# Patient Record
Sex: FEMALE | Race: WHITE | NOT HISPANIC OR LATINO | Employment: OTHER | URBAN - METROPOLITAN AREA
[De-identification: names, ages, dates, MRNs, and addresses within clinical notes are randomized per-mention and may not be internally consistent; named-entity substitution may affect disease eponyms.]

---

## 2023-08-30 ENCOUNTER — HOSPITAL ENCOUNTER (INPATIENT)
Facility: HOSPITAL | Age: 73
LOS: 12 days | Discharge: HOSPICE | DRG: 064 | End: 2023-09-11
Attending: EMERGENCY MEDICINE | Admitting: INTERNAL MEDICINE
Payer: MEDICARE

## 2023-08-30 DIAGNOSIS — I48.91 ATRIAL FIBRILLATION WITH RVR: ICD-10-CM

## 2023-08-30 DIAGNOSIS — I61.9 HEMORRHAGIC CEREBROVASCULAR ACCIDENT (CVA): ICD-10-CM

## 2023-08-30 DIAGNOSIS — Q28.2: ICD-10-CM

## 2023-08-30 DIAGNOSIS — I48.92 ATRIAL FLUTTER: ICD-10-CM

## 2023-08-30 DIAGNOSIS — S51.811A SKIN TEAR OF RIGHT FOREARM WITHOUT COMPLICATION, INITIAL ENCOUNTER: Primary | ICD-10-CM

## 2023-08-30 DIAGNOSIS — Q28.2 AVM (ARTERIOVENOUS MALFORMATION) BRAIN: ICD-10-CM

## 2023-08-30 DIAGNOSIS — R41.82 ALTERED MENTAL STATUS: ICD-10-CM

## 2023-08-30 DIAGNOSIS — I63.9 STROKE: ICD-10-CM

## 2023-08-30 LAB
ANION GAP SERPL CALC-SCNC: 14 MMOL/L (ref 8–16)
APTT PPP: 26.7 SECONDS (ref 23.2–33.7)
BASOPHILS # BLD AUTO: 0.04 X10(3)/MCL
BASOPHILS NFR BLD AUTO: 0.4 %
BUN SERPL-MCNC: 8 MG/DL (ref 6–30)
CHLORIDE SERPL-SCNC: 97 MMOL/L (ref 95–110)
CREAT SERPL-MCNC: 0.6 MG/DL (ref 0.5–1.4)
EOSINOPHIL # BLD AUTO: 0.01 X10(3)/MCL (ref 0–0.9)
EOSINOPHIL NFR BLD AUTO: 0.1 %
ERYTHROCYTE [DISTWIDTH] IN BLOOD BY AUTOMATED COUNT: 13.3 % (ref 11.5–17)
GLUCOSE SERPL-MCNC: 129 MG/DL (ref 70–110)
HCT VFR BLD AUTO: 43.2 % (ref 37–47)
HCT VFR BLD CALC: 45 %PCV (ref 36–54)
HGB BLD-MCNC: 15 G/DL
HGB BLD-MCNC: 15 G/DL (ref 12–16)
IMM GRANULOCYTES # BLD AUTO: 0.03 X10(3)/MCL (ref 0–0.04)
IMM GRANULOCYTES NFR BLD AUTO: 0.3 %
INR PPP: 1.1
LYMPHOCYTES # BLD AUTO: 1.02 X10(3)/MCL (ref 0.6–4.6)
LYMPHOCYTES NFR BLD AUTO: 10.9 %
MCH RBC QN AUTO: 32.6 PG (ref 27–31)
MCHC RBC AUTO-ENTMCNC: 34.7 G/DL (ref 33–36)
MCV RBC AUTO: 93.9 FL (ref 80–94)
MONOCYTES # BLD AUTO: 0.54 X10(3)/MCL (ref 0.1–1.3)
MONOCYTES NFR BLD AUTO: 5.7 %
NEUTROPHILS # BLD AUTO: 7.76 X10(3)/MCL (ref 2.1–9.2)
NEUTROPHILS NFR BLD AUTO: 82.6 %
NRBC BLD AUTO-RTO: 0 %
PLATELET # BLD AUTO: 174 X10(3)/MCL (ref 130–400)
PMV BLD AUTO: 9.8 FL (ref 7.4–10.4)
POC IONIZED CALCIUM: 1.18 MMOL/L (ref 1.06–1.42)
POC PTINR: 1.1 (ref 0.9–1.2)
POC PTWBT: 13.5 SEC (ref 9.7–14.3)
POC TCO2 (MEASURED): 27 MMOL/L (ref 23–29)
POTASSIUM BLD-SCNC: 4.5 MMOL/L (ref 3.5–5.1)
PROTHROMBIN TIME: 13.9 SECONDS (ref 12.5–14.5)
RBC # BLD AUTO: 4.6 X10(6)/MCL (ref 4.2–5.4)
SAMPLE: ABNORMAL
SAMPLE: NORMAL
SODIUM BLD-SCNC: 133 MMOL/L (ref 136–145)
WBC # SPEC AUTO: 9.4 X10(3)/MCL (ref 4.5–11.5)

## 2023-08-30 PROCEDURE — 80053 COMPREHEN METABOLIC PANEL: CPT | Performed by: EMERGENCY MEDICINE

## 2023-08-30 PROCEDURE — 90471 IMMUNIZATION ADMIN: CPT | Performed by: EMERGENCY MEDICINE

## 2023-08-30 PROCEDURE — G0390 TRAUMA RESPONS W/HOSP CRITI: HCPCS

## 2023-08-30 PROCEDURE — 90715 TDAP VACCINE 7 YRS/> IM: CPT | Performed by: EMERGENCY MEDICINE

## 2023-08-30 PROCEDURE — 85025 COMPLETE CBC W/AUTO DIFF WBC: CPT | Performed by: EMERGENCY MEDICINE

## 2023-08-30 PROCEDURE — 80047 BASIC METABLC PNL IONIZED CA: CPT

## 2023-08-30 PROCEDURE — 96374 THER/PROPH/DIAG INJ IV PUSH: CPT

## 2023-08-30 PROCEDURE — 85730 THROMBOPLASTIN TIME PARTIAL: CPT | Performed by: EMERGENCY MEDICINE

## 2023-08-30 PROCEDURE — 82077 ASSAY SPEC XCP UR&BREATH IA: CPT | Performed by: EMERGENCY MEDICINE

## 2023-08-30 PROCEDURE — 84484 ASSAY OF TROPONIN QUANT: CPT | Performed by: EMERGENCY MEDICINE

## 2023-08-30 PROCEDURE — 11000001 HC ACUTE MED/SURG PRIVATE ROOM

## 2023-08-30 PROCEDURE — 85610 PROTHROMBIN TIME: CPT | Performed by: EMERGENCY MEDICINE

## 2023-08-30 PROCEDURE — 25000003 PHARM REV CODE 250: Performed by: EMERGENCY MEDICINE

## 2023-08-30 PROCEDURE — 63600175 PHARM REV CODE 636 W HCPCS: Performed by: EMERGENCY MEDICINE

## 2023-08-30 PROCEDURE — 99285 EMERGENCY DEPT VISIT HI MDM: CPT | Mod: 25

## 2023-08-30 RX ORDER — LEVETIRACETAM 10 MG/ML
1000 INJECTION INTRAVASCULAR
Status: COMPLETED | OUTPATIENT
Start: 2023-08-31 | End: 2023-08-31

## 2023-08-30 RX ORDER — FENTANYL CITRATE 50 UG/ML
50 INJECTION, SOLUTION INTRAMUSCULAR; INTRAVENOUS
Status: COMPLETED | OUTPATIENT
Start: 2023-08-31 | End: 2023-08-31

## 2023-08-30 RX ORDER — LORAZEPAM 2 MG/ML
INJECTION INTRAMUSCULAR
Status: DISPENSED
Start: 2023-08-30 | End: 2023-08-31

## 2023-08-30 RX ORDER — LORAZEPAM 2 MG/ML
INJECTION INTRAMUSCULAR CODE/TRAUMA/SEDATION MEDICATION
Status: COMPLETED | OUTPATIENT
Start: 2023-08-30 | End: 2023-08-30

## 2023-08-30 RX ORDER — ONDANSETRON 2 MG/ML
8 INJECTION INTRAMUSCULAR; INTRAVENOUS
Status: COMPLETED | OUTPATIENT
Start: 2023-08-31 | End: 2023-08-31

## 2023-08-30 RX ADMIN — TETANUS TOXOID, REDUCED DIPHTHERIA TOXOID AND ACELLULAR PERTUSSIS VACCINE, ADSORBED 0.5 ML: 5; 2.5; 8; 8; 2.5 SUSPENSION INTRAMUSCULAR at 11:08

## 2023-08-30 RX ADMIN — LORAZEPAM 1 MG: 2 INJECTION INTRAMUSCULAR; INTRAVENOUS at 11:08

## 2023-08-30 RX ADMIN — SODIUM CHLORIDE 1000 ML: 9 INJECTION, SOLUTION INTRAVENOUS at 11:08

## 2023-08-30 RX ADMIN — IOPAMIDOL 50 ML: 755 INJECTION, SOLUTION INTRAVENOUS at 11:08

## 2023-08-30 NOTE — Clinical Note
Diagnosis: Hemorrhagic cerebrovascular accident (CVA) [1966252]   Admitting Provider:: ARIS ESTEVEZ JR. [[id number]]   Future Attending Provider: ARIS ESTEVEZ JR. [[id number]]   Reason for IP Medical Treatment  (Clinical interventions that can only be accomplished in the IP setting? ) :: IV BP control, q1h neuro checks   I certify that Inpatient services for greater than or equal to 2 midnights are medically necessary:: Yes   Plans for Post-Acute care--if anticipated (pick the single best option):: A. No post acute care anticipated at this time

## 2023-08-31 ENCOUNTER — ANESTHESIA (OUTPATIENT)
Dept: INTERVENTIONAL RADIOLOGY/VASCULAR | Facility: HOSPITAL | Age: 73
DRG: 064 | End: 2023-08-31
Payer: MEDICARE

## 2023-08-31 PROBLEM — I61.9 INTRACEREBRAL HEMORRHAGE: Status: ACTIVE | Noted: 2023-08-31

## 2023-08-31 LAB
ALBUMIN SERPL-MCNC: 4 G/DL (ref 3.4–4.8)
ALBUMIN SERPL-MCNC: 4 G/DL (ref 3.4–4.8)
ALBUMIN/GLOB SERPL: 1.4 RATIO (ref 1.1–2)
ALBUMIN/GLOB SERPL: 1.4 RATIO (ref 1.1–2)
ALLENS TEST BLOOD GAS (OHS): ABNORMAL
ALP SERPL-CCNC: 69 UNIT/L (ref 40–150)
ALP SERPL-CCNC: 72 UNIT/L (ref 40–150)
ALT SERPL-CCNC: 11 UNIT/L (ref 0–55)
ALT SERPL-CCNC: 12 UNIT/L (ref 0–55)
AMPHET UR QL SCN: NEGATIVE
APPEARANCE UR: CLEAR
AST SERPL-CCNC: 17 UNIT/L (ref 5–34)
AST SERPL-CCNC: 22 UNIT/L (ref 5–34)
BACTERIA #/AREA URNS AUTO: NORMAL /HPF
BARBITURATE SCN PRESENT UR: NEGATIVE
BASE EXCESS BLD CALC-SCNC: 1.6 MMOL/L (ref -2–2)
BASOPHILS # BLD AUTO: 0.04 X10(3)/MCL
BASOPHILS NFR BLD AUTO: 0.3 %
BENZODIAZ UR QL SCN: NEGATIVE
BILIRUB SERPL-MCNC: 0.6 MG/DL
BILIRUB SERPL-MCNC: 0.8 MG/DL
BILIRUB UR QL STRIP.AUTO: NEGATIVE
BLOOD GAS SAMPLE TYPE (OHS): ABNORMAL
BUN SERPL-MCNC: 6.6 MG/DL (ref 9.8–20.1)
BUN SERPL-MCNC: 7.6 MG/DL (ref 9.8–20.1)
CA-I BLD-SCNC: 1.09 MMOL/L (ref 1.12–1.23)
CALCIUM SERPL-MCNC: 8.9 MG/DL (ref 8.4–10.2)
CALCIUM SERPL-MCNC: 9.3 MG/DL (ref 8.4–10.2)
CANNABINOIDS UR QL SCN: NEGATIVE
CHLORIDE SERPL-SCNC: 100 MMOL/L (ref 98–107)
CHLORIDE SERPL-SCNC: 99 MMOL/L (ref 98–107)
CO2 BLDA-SCNC: 14.9 MMOL/L
CO2 SERPL-SCNC: 24 MMOL/L (ref 23–31)
CO2 SERPL-SCNC: 24 MMOL/L (ref 23–31)
COCAINE UR QL SCN: NEGATIVE
COLOR UR: YELLOW
CREAT SERPL-MCNC: 0.63 MG/DL (ref 0.55–1.02)
CREAT SERPL-MCNC: 0.67 MG/DL (ref 0.55–1.02)
DRAWN BY BLOOD GAS (OHS): ABNORMAL
EOSINOPHIL # BLD AUTO: 0 X10(3)/MCL (ref 0–0.9)
EOSINOPHIL NFR BLD AUTO: 0 %
ERYTHROCYTE [DISTWIDTH] IN BLOOD BY AUTOMATED COUNT: 13.3 % (ref 11.5–17)
ETHANOL SERPL-MCNC: <10 MG/DL
FENTANYL UR QL SCN: NEGATIVE
FIO2 BLOOD GAS (OHS): 30 %
GFR SERPLBLD CREATININE-BSD FMLA CKD-EPI: >60 MLS/MIN/1.73/M2
GFR SERPLBLD CREATININE-BSD FMLA CKD-EPI: >60 MLS/MIN/1.73/M2
GLOBULIN SER-MCNC: 2.8 GM/DL (ref 2.4–3.5)
GLOBULIN SER-MCNC: 2.9 GM/DL (ref 2.4–3.5)
GLUCOSE SERPL-MCNC: 130 MG/DL (ref 82–115)
GLUCOSE SERPL-MCNC: 143 MG/DL (ref 82–115)
GLUCOSE UR QL STRIP.AUTO: NEGATIVE
HCO3 BLDA-SCNC: 25.9 MMOL/L (ref 22–26)
HCT VFR BLD AUTO: 42.6 % (ref 37–47)
HGB BLD-MCNC: 14.7 G/DL (ref 12–16)
IMM GRANULOCYTES # BLD AUTO: 0.05 X10(3)/MCL (ref 0–0.04)
IMM GRANULOCYTES NFR BLD AUTO: 0.4 %
KETONES UR QL STRIP.AUTO: NEGATIVE
LEUKOCYTE ESTERASE UR QL STRIP.AUTO: NEGATIVE
LYMPHOCYTES # BLD AUTO: 0.82 X10(3)/MCL (ref 0.6–4.6)
LYMPHOCYTES NFR BLD AUTO: 6.8 %
MAGNESIUM SERPL-MCNC: 1.7 MG/DL (ref 1.6–2.6)
MCH RBC QN AUTO: 32.6 PG (ref 27–31)
MCHC RBC AUTO-ENTMCNC: 34.5 G/DL (ref 33–36)
MCV RBC AUTO: 94.5 FL (ref 80–94)
MDMA UR QL SCN: NEGATIVE
MECH RR (OHS): 20 B/MIN
MECH VT (OHS): 450 ML
MODE (OHS): AC
MONOCYTES # BLD AUTO: 0.82 X10(3)/MCL (ref 0.1–1.3)
MONOCYTES NFR BLD AUTO: 6.8 %
NEUTROPHILS # BLD AUTO: 10.4 X10(3)/MCL (ref 2.1–9.2)
NEUTROPHILS NFR BLD AUTO: 85.7 %
NITRITE UR QL STRIP.AUTO: NEGATIVE
NRBC BLD AUTO-RTO: 0 %
OPIATES UR QL SCN: NEGATIVE
OXYGEN DEVICE BLOOD GAS (OHS): ABNORMAL
PCO2 BLDA: 39 MMHG (ref 35–45)
PCP UR QL: NEGATIVE
PEEP (OHS): 5 CMH2O
PH BLDA: 7.43 [PH] (ref 7.35–7.45)
PH UR STRIP.AUTO: 7.5 [PH]
PH UR: 7.5 [PH] (ref 3–11)
PHOSPHATE SERPL-MCNC: 2.7 MG/DL (ref 2.3–4.7)
PLATELET # BLD AUTO: 181 X10(3)/MCL (ref 130–400)
PMV BLD AUTO: 10.2 FL (ref 7.4–10.4)
PO2 BLDA: 84 MMHG (ref 80–100)
POTASSIUM BLOOD GAS (OHS): 2.9 MMOL/L (ref 3.5–5)
POTASSIUM SERPL-SCNC: 3.8 MMOL/L (ref 3.5–5.1)
POTASSIUM SERPL-SCNC: 4 MMOL/L (ref 3.5–5.1)
PROT SERPL-MCNC: 6.8 GM/DL (ref 5.8–7.6)
PROT SERPL-MCNC: 6.9 GM/DL (ref 5.8–7.6)
PROT UR QL STRIP.AUTO: NEGATIVE
PS (OHS): 10 CMH2O
RBC # BLD AUTO: 4.51 X10(6)/MCL (ref 4.2–5.4)
RBC #/AREA URNS AUTO: NORMAL /HPF
RBC UR QL AUTO: NEGATIVE
SAMPLE SITE BLOOD GAS (OHS): ABNORMAL
SAO2 % BLDA: 96.6 %
SODIUM BLOOD GAS (OHS): 129 MMOL/L (ref 137–145)
SODIUM SERPL-SCNC: 133 MMOL/L (ref 136–145)
SODIUM SERPL-SCNC: 134 MMOL/L (ref 136–145)
SP GR UR STRIP.AUTO: 1.02 (ref 1–1.03)
SPECIFIC GRAVITY, URINE AUTO (.000) (OHS): 1.02 (ref 1–1.03)
SQUAMOUS #/AREA URNS AUTO: NORMAL /HPF
TROPONIN I SERPL-MCNC: <0.01 NG/ML (ref 0–0.04)
UROBILINOGEN UR STRIP-ACNC: 0.2
WBC # SPEC AUTO: 12.13 X10(3)/MCL (ref 4.5–11.5)
WBC #/AREA URNS AUTO: NORMAL /HPF

## 2023-08-31 PROCEDURE — 25000003 PHARM REV CODE 250: Performed by: INTERNAL MEDICINE

## 2023-08-31 PROCEDURE — 63600175 PHARM REV CODE 636 W HCPCS

## 2023-08-31 PROCEDURE — D9220A PRA ANESTHESIA: Mod: ANES,,, | Performed by: ANESTHESIOLOGY

## 2023-08-31 PROCEDURE — 80053 COMPREHEN METABOLIC PANEL: CPT

## 2023-08-31 PROCEDURE — 25000003 PHARM REV CODE 250

## 2023-08-31 PROCEDURE — 36224 PR ANGIO INTRCRNL ART +/- CERVIOCEREBRAL ARCH, INTRNL CAROTID ART, SELECTV CATH ,S&I: ICD-10-PCS | Mod: 50,,, | Performed by: PSYCHIATRY & NEUROLOGY

## 2023-08-31 PROCEDURE — 76937 US GUIDE VASCULAR ACCESS: CPT | Mod: 26,,, | Performed by: PSYCHIATRY & NEUROLOGY

## 2023-08-31 PROCEDURE — 99223 PR INITIAL HOSPITAL CARE,LEVL III: ICD-10-PCS | Mod: ,,, | Performed by: NEUROLOGICAL SURGERY

## 2023-08-31 PROCEDURE — 36227 PR ANGIO XTRNL CAROTD CIRC, XTRNL CAROTID, SELECTV CATH S&I: ICD-10-PCS | Mod: 50,,, | Performed by: PSYCHIATRY & NEUROLOGY

## 2023-08-31 PROCEDURE — S0166 INJ OLANZAPINE 2.5MG: HCPCS

## 2023-08-31 PROCEDURE — 63600175 PHARM REV CODE 636 W HCPCS: Performed by: INTERNAL MEDICINE

## 2023-08-31 PROCEDURE — 63600175 PHARM REV CODE 636 W HCPCS: Performed by: EMERGENCY MEDICINE

## 2023-08-31 PROCEDURE — 81001 URINALYSIS AUTO W/SCOPE: CPT | Performed by: EMERGENCY MEDICINE

## 2023-08-31 PROCEDURE — D9220A PRA ANESTHESIA: Mod: CRNA,,,

## 2023-08-31 PROCEDURE — 80307 DRUG TEST PRSMV CHEM ANLYZR: CPT | Performed by: EMERGENCY MEDICINE

## 2023-08-31 PROCEDURE — 20000000 HC ICU ROOM

## 2023-08-31 PROCEDURE — 27100171 HC OXYGEN HIGH FLOW UP TO 24 HOURS

## 2023-08-31 PROCEDURE — 63600175 PHARM REV CODE 636 W HCPCS: Mod: JZ,JG | Performed by: EMERGENCY MEDICINE

## 2023-08-31 PROCEDURE — 93005 ELECTROCARDIOGRAM TRACING: CPT

## 2023-08-31 PROCEDURE — 36226 PR ANGIO VERTEBRAL ARTERY +/- CERVIOCEREBRAL ARCH, VERTEBRAL ART, SELECTV CATH,S&I: ICD-10-PCS | Mod: 51,50,, | Performed by: PSYCHIATRY & NEUROLOGY

## 2023-08-31 PROCEDURE — 25500020 PHARM REV CODE 255: Performed by: EMERGENCY MEDICINE

## 2023-08-31 PROCEDURE — 76937 PR  US GUIDE, VASCULAR ACCESS: ICD-10-PCS | Mod: 26,,, | Performed by: PSYCHIATRY & NEUROLOGY

## 2023-08-31 PROCEDURE — 36227 PLACE CATH XTRNL CAROTID: CPT | Mod: 50,,, | Performed by: PSYCHIATRY & NEUROLOGY

## 2023-08-31 PROCEDURE — 82803 BLOOD GASES ANY COMBINATION: CPT

## 2023-08-31 PROCEDURE — 36224 PLACE CATH CAROTD ART: CPT | Mod: 50,,, | Performed by: PSYCHIATRY & NEUROLOGY

## 2023-08-31 PROCEDURE — 94002 VENT MGMT INPAT INIT DAY: CPT

## 2023-08-31 PROCEDURE — 84100 ASSAY OF PHOSPHORUS: CPT

## 2023-08-31 PROCEDURE — 83735 ASSAY OF MAGNESIUM: CPT

## 2023-08-31 PROCEDURE — C9248 INJ, CLEVIDIPINE BUTYRATE: HCPCS | Mod: JZ,JG | Performed by: EMERGENCY MEDICINE

## 2023-08-31 PROCEDURE — C1751 CATH, INF, PER/CENT/MIDLINE: HCPCS

## 2023-08-31 PROCEDURE — 99223 1ST HOSP IP/OBS HIGH 75: CPT | Mod: ,,, | Performed by: NEUROLOGICAL SURGERY

## 2023-08-31 PROCEDURE — D9220A PRA ANESTHESIA: ICD-10-PCS | Mod: ANES,,, | Performed by: ANESTHESIOLOGY

## 2023-08-31 PROCEDURE — 99900026 HC AIRWAY MAINTENANCE (STAT)

## 2023-08-31 PROCEDURE — D9220A PRA ANESTHESIA: ICD-10-PCS | Mod: CRNA,,,

## 2023-08-31 PROCEDURE — 63600175 PHARM REV CODE 636 W HCPCS: Performed by: PHYSICIAN ASSISTANT

## 2023-08-31 PROCEDURE — 99900035 HC TECH TIME PER 15 MIN (STAT)

## 2023-08-31 PROCEDURE — 94761 N-INVAS EAR/PLS OXIMETRY MLT: CPT

## 2023-08-31 PROCEDURE — 36226 PLACE CATH VERTEBRAL ART: CPT | Mod: 51,50,, | Performed by: PSYCHIATRY & NEUROLOGY

## 2023-08-31 PROCEDURE — 85025 COMPLETE CBC W/AUTO DIFF WBC: CPT

## 2023-08-31 PROCEDURE — 27200966 HC CLOSED SUCTION SYSTEM

## 2023-08-31 PROCEDURE — 36569 INSJ PICC 5 YR+ W/O IMAGING: CPT

## 2023-08-31 PROCEDURE — 37799 UNLISTED PX VASCULAR SURGERY: CPT

## 2023-08-31 RX ORDER — OLANZAPINE 10 MG/2ML
5 INJECTION, POWDER, FOR SOLUTION INTRAMUSCULAR ONCE
Status: COMPLETED | OUTPATIENT
Start: 2023-08-31 | End: 2023-08-31

## 2023-08-31 RX ORDER — FENTANYL CITRATE 50 UG/ML
INJECTION, SOLUTION INTRAMUSCULAR; INTRAVENOUS
Status: DISCONTINUED | OUTPATIENT
Start: 2023-08-31 | End: 2023-08-31

## 2023-08-31 RX ORDER — LABETALOL HYDROCHLORIDE 5 MG/ML
10 INJECTION, SOLUTION INTRAVENOUS EVERY 4 HOURS PRN
Status: DISCONTINUED | OUTPATIENT
Start: 2023-08-31 | End: 2023-09-11 | Stop reason: HOSPADM

## 2023-08-31 RX ORDER — LIDOCAINE HYDROCHLORIDE 20 MG/ML
INJECTION, SOLUTION EPIDURAL; INFILTRATION; INTRACAUDAL; PERINEURAL
Status: DISCONTINUED | OUTPATIENT
Start: 2023-08-31 | End: 2023-08-31

## 2023-08-31 RX ORDER — LORAZEPAM 2 MG/ML
1 INJECTION INTRAMUSCULAR ONCE
Status: COMPLETED | OUTPATIENT
Start: 2023-08-31 | End: 2023-08-31

## 2023-08-31 RX ORDER — PROPOFOL 10 MG/ML
VIAL (ML) INTRAVENOUS
Status: DISCONTINUED | OUTPATIENT
Start: 2023-08-31 | End: 2023-08-31

## 2023-08-31 RX ORDER — PROPOFOL 10 MG/ML
0-50 INJECTION, EMULSION INTRAVENOUS CONTINUOUS
Status: DISCONTINUED | OUTPATIENT
Start: 2023-08-31 | End: 2023-09-02

## 2023-08-31 RX ORDER — SODIUM CHLORIDE 0.9 % (FLUSH) 0.9 %
10 SYRINGE (ML) INJECTION
Status: DISCONTINUED | OUTPATIENT
Start: 2023-08-31 | End: 2023-09-04

## 2023-08-31 RX ORDER — PROPOFOL 10 MG/ML
INJECTION, EMULSION INTRAVENOUS
Status: COMPLETED
Start: 2023-08-31 | End: 2023-09-01

## 2023-08-31 RX ORDER — MUPIROCIN 20 MG/G
OINTMENT TOPICAL 2 TIMES DAILY
Status: COMPLETED | OUTPATIENT
Start: 2023-08-31 | End: 2023-09-04

## 2023-08-31 RX ORDER — EPHEDRINE SULFATE 50 MG/ML
INJECTION, SOLUTION INTRAVENOUS
Status: DISCONTINUED | OUTPATIENT
Start: 2023-08-31 | End: 2023-08-31

## 2023-08-31 RX ORDER — FENTANYL CITRATE-0.9 % NACL/PF 10 MCG/ML
0-250 PLASTIC BAG, INJECTION (ML) INTRAVENOUS CONTINUOUS PRN
Status: DISCONTINUED | OUTPATIENT
Start: 2023-08-31 | End: 2023-09-03

## 2023-08-31 RX ORDER — 3% SODIUM CHLORIDE 3 G/100ML
50 INJECTION, SOLUTION INTRAVENOUS CONTINUOUS
Status: DISCONTINUED | OUTPATIENT
Start: 2023-08-31 | End: 2023-09-02

## 2023-08-31 RX ORDER — SODIUM CHLORIDE 0.9 % (FLUSH) 0.9 %
10 SYRINGE (ML) INJECTION EVERY 6 HOURS
Status: DISCONTINUED | OUTPATIENT
Start: 2023-09-01 | End: 2023-09-04

## 2023-08-31 RX ORDER — SODIUM CHLORIDE 0.9 % (FLUSH) 0.9 %
10 SYRINGE (ML) INJECTION
Status: DISCONTINUED | OUTPATIENT
Start: 2023-08-31 | End: 2023-09-01 | Stop reason: SDUPTHER

## 2023-08-31 RX ORDER — ROCURONIUM BROMIDE 10 MG/ML
INJECTION, SOLUTION INTRAVENOUS
Status: DISCONTINUED | OUTPATIENT
Start: 2023-08-31 | End: 2023-08-31

## 2023-08-31 RX ORDER — PHENYLEPHRINE HYDROCHLORIDE 10 MG/ML
INJECTION INTRAVENOUS
Status: DISCONTINUED | OUTPATIENT
Start: 2023-08-31 | End: 2023-08-31

## 2023-08-31 RX ADMIN — PROPOFOL 1000 MG: 10 INJECTION, EMULSION INTRAVENOUS at 04:08

## 2023-08-31 RX ADMIN — SODIUM CHLORIDE 50 ML/HR: 3 INJECTION, SOLUTION INTRAVENOUS at 09:08

## 2023-08-31 RX ADMIN — LORAZEPAM 1 MG: 2 INJECTION INTRAMUSCULAR; INTRAVENOUS at 05:08

## 2023-08-31 RX ADMIN — PHENYLEPHRINE HYDROCHLORIDE 100 MCG: 10 INJECTION INTRAVENOUS at 11:08

## 2023-08-31 RX ADMIN — SODIUM CHLORIDE, SODIUM GLUCONATE, SODIUM ACETATE, POTASSIUM CHLORIDE AND MAGNESIUM CHLORIDE: 526; 502; 368; 37; 30 INJECTION, SOLUTION INTRAVENOUS at 11:08

## 2023-08-31 RX ADMIN — PROPOFOL 50 MCG/KG/MIN: 10 INJECTION, EMULSION INTRAVENOUS at 05:08

## 2023-08-31 RX ADMIN — CLEVIPIDINE 2 MG/HR: 0.5 EMULSION INTRAVENOUS at 02:08

## 2023-08-31 RX ADMIN — LEVETIRACETAM 500 MG: 100 INJECTION, SOLUTION INTRAVENOUS at 08:08

## 2023-08-31 RX ADMIN — LEVETIRACETAM 500 MG: 100 INJECTION, SOLUTION INTRAVENOUS at 10:08

## 2023-08-31 RX ADMIN — PHENYLEPHRINE HYDROCHLORIDE 100 MCG: 10 INJECTION INTRAVENOUS at 12:08

## 2023-08-31 RX ADMIN — ONDANSETRON 8 MG: 2 INJECTION INTRAMUSCULAR; INTRAVENOUS at 12:08

## 2023-08-31 RX ADMIN — LIDOCAINE HYDROCHLORIDE 80 MG: 20 INJECTION, SOLUTION INTRAVENOUS at 11:08

## 2023-08-31 RX ADMIN — MUPIROCIN: 20 OINTMENT TOPICAL at 08:08

## 2023-08-31 RX ADMIN — ROCURONIUM BROMIDE 60 MG: 10 SOLUTION INTRAVENOUS at 11:08

## 2023-08-31 RX ADMIN — Medication 25 MCG/HR: at 09:08

## 2023-08-31 RX ADMIN — MUPIROCIN: 20 OINTMENT TOPICAL at 10:08

## 2023-08-31 RX ADMIN — FENTANYL CITRATE 50 MCG: 50 INJECTION, SOLUTION INTRAMUSCULAR; INTRAVENOUS at 12:08

## 2023-08-31 RX ADMIN — EPHEDRINE SULFATE 10 MG: 50 INJECTION, SOLUTION INTRAMUSCULAR; INTRAVENOUS; SUBCUTANEOUS at 12:08

## 2023-08-31 RX ADMIN — LEVETIRACETAM INJECTION 1000 MG: 10 INJECTION INTRAVENOUS at 12:08

## 2023-08-31 RX ADMIN — FENTANYL CITRATE 100 MCG: 50 INJECTION, SOLUTION INTRAMUSCULAR; INTRAVENOUS at 11:08

## 2023-08-31 RX ADMIN — PROPOFOL 150 MG: 10 INJECTION, EMULSION INTRAVENOUS at 11:08

## 2023-08-31 RX ADMIN — ROCURONIUM BROMIDE 40 MG: 10 SOLUTION INTRAVENOUS at 12:08

## 2023-08-31 RX ADMIN — OLANZAPINE 5 MG: 10 INJECTION, POWDER, FOR SOLUTION INTRAMUSCULAR at 03:08

## 2023-08-31 NOTE — H&P
[facility] - Emergency Dept  Pulmonary Critical Care Note    Patient Name: Bal Cline  MRN: [id number]  Admission Date: 8/30/2023  Hospital Length of Stay: 1 days  Code Status: No Order  Attending Provider: Roberto Biswas Jr., MD,*  Primary Care Provider: No primary care provider on file.     Subjective:     HPI:   Patient is a 73-year-old female with no past medical history on file who presented to the ED on 08/30 via EMS after sustaining a fall at home.  Per EMS, patient fell off the side of the bed but was caught by her .  Denied loss of consciousness and head trauma.  EMS reported that the patient was confused on arrival and having slurred speech with right-sided facial droop.  Last known normal time was around 2100 today.  Patient's family was not present in the ED during my evaluation of the patient and the patient was not able to contribute to history.    In the ED, CT head was conducted which displayed evidence of left hemorrhagic CVA.  Neurosurgery was consulted who recommended blood pressure control with systolic blood pressure less than 140 and repeat head CT in 6 hours.  Keppra for seizure prophylaxis.  Patient was admitted to the ICU for further care and management.    Hospital Course/Significant events:      24 Hour Interval History:      No past medical history on file.    No past surgical history on file.     No current outpatient medications    Current Inpatient Medications   levETIRAcetam (Keppra) IV (PEDS and ADULTS)  1,000 mg Intravenous ED 1 Time    LORazepam        sodium chloride 0.9%  1,000 mL Intravenous ED 1 Time       Current Intravenous Infusions   clevidipine       Review of Systems   Unable to perform ROS: Acuity of condition      Objective:     No intake or output data in the 24 hours ending 08/31/23 0020      Vital Signs (Most Recent):  Resp: 20 (08/31/23 0000)  There is no height or weight on file to calculate BMI.    Vital Signs (24h Range):  Resp:   "[20] 20     Physical Exam  Vitals and nursing note reviewed.   Constitutional:       Comments: Patient awake, mostly nonverbal, and follows some commands   HENT:      Head: Normocephalic and atraumatic.   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  Left pupil is approximately 3 mm in diameter.   Cardiovascular:      Rate and Rhythm: Normal rate and regular rhythm.      Pulses: Normal pulses.      Heart sounds: Normal heart sounds.   Pulmonary:      Effort: Pulmonary effort is normal.      Breath sounds: Normal breath sounds.   Abdominal:      General: There is no distension.      Palpations: Abdomen is soft.   Musculoskeletal:         General: Signs of injury present.      Comments: Patient displays movement of of left upper and lower extremities.  She does not move right upper and lower extremities.  There are wounds to bilateral upper extremities.   Skin:     General: Skin is warm and dry.   Neurological:      Mental Status: She is disoriented.       Lines/Drains/Airways       Peripheral Intravenous Line  Duration                  Peripheral IV - Single Lumen 08/30/23 2304 18 G Anterior;Left;Proximal Forearm <1 day                    Significant Labs:    Lab Results   Component Value Date    WBC 9.40 08/30/2023    HGB 15.0 08/30/2023    HCT 45 08/30/2023    MCV 93.9 08/30/2023     08/30/2023     BMP  Lab Results   Component Value Date     (L) 08/30/2023    K 4.0 08/30/2023    CHLORIDE 99 08/30/2023    CO2 24 08/30/2023    BUN 7.6 (L) 08/30/2023    CREATININE 0.67 08/30/2023    CALCIUM 9.3 08/30/2023     ABG  No results for input(s): "PH", "PO2", "PCO2", "HCO3", "POCBASEDEF" in the last 168 hours.       Significant Imaging:  I have reviewed the pertinent imaging within the past 24 hours.  Assessment/Plan:     Assessment  Left-sided hemorrhagic CVA as demonstrated on CT head with no evidence of midline shift    Plan  1. Patient admitted " to ICU for further care and management   2. Patient started on Cleviprex for blood pressure management.  Labetalol ordered p.r.n.  Goal is to keep systolic blood pressure less than 140.  3. Repeat head CT in 6 hours per neurosurgery  4. Keppra for seizure prophylaxis 500 b.i.d.  5. Q1hr neuro checks, avoid antiplatelets/anticoagulation  5. Continue to follow Neurosurgery recommendations    45 minutes of critical care was time spent personally by me on the following activities: development of treatment plan with patient or surrogate and bedside caregivers, discussions with consultants, evaluation of patient's response to treatment, examination of patient, ordering and performing treatments and interventions, ordering and review of laboratory studies, ordering and review of radiographic studies, pulse oximetry, re-evaluation of patient's condition.  This critical care time did not overlap with that of any other provider or involve time for any procedures.     Ramiro De Santiago,   Pulmonary Critical Care Medicine  [facility] - Emergency Dept

## 2023-08-31 NOTE — ASSESSMENT & PLAN NOTE
Intracerebral hemorrhage and traumatic SAH    -CTA head and neck: No acute angiographic abnormality in the CT angiogram of the head and neck with no aneurysm dissection or occlusion identified. Details and other findings as described above.    8/31/23 (5:40 am)  -CT head:   1. There is a stable appearing intraparenchymal hematoma in the left temporal lobe measuring 4.5 x 4.5 cm. There is intraventricular extension of bleed with mild intraventricular hemorrhage layering in the dependent portion of the occipital horns of bilateral lateral ventricles. There is perilesional edema causing mass effect in the form of effacement of adjacent sulcal spaces and compression on the left lateral ventricle. There is stable appearing subarachnoid hemorrhage in the sulcal spaces of the left temporal lobe and in the left sylvian fissure.  2. Details and other findings as noted above.    -Recommend SBP less than 140  -Q1 hour neuro checks  -Avoid antiplatelets/anticoagulation  -Seizure precautions   -neurosurgery following, no surgical intervention  -could consider repeat CT head tomorrow  -could consider MRI eventually (does not need to be done now, will discuss with MD during rounds, could be done in the future, unlikely she will be cooperative for MRI at this time)

## 2023-08-31 NOTE — CONSULTS
"   Trauma Surgery   Activation Note    Patient Name: Bozena Shelton  MRN: [id number]   YOB: 1950  Date: 08/31/2023    LEVEL 2 TRAUMA     Subjective:   History of present illness: Patient is an approximately 73 year old female presenting via ground EMS. Family states she was acting differently for the last two hours with slurred speech, possible facial droop and agitation. When she got out of bed, she went to fall but was caught by her . EMS denies her hitting head. No reported blood thinners. Is incontinent of stool with right sided neglect and repetitive questioning. Right pupil with suspected chronic changes: irregular shape but briskly reactive.     Primary Survey:  A patent   B CTAB   C Oswaldo radial and BP pulses, skin pale but warm and dry   D GCS 13(E 4, V 4, M 5)    E exposed, log-rolled and examined (see below)   F See below     VITAL SIGNS: 24 HR MIN & MAX LAST   Temp  Min: 97.8 °F (36.6 °C)  Max: 98.8 °F (37.1 °C)  98.1 °F (36.7 °C)   BP  Min: 106/88  Max: 152/84  (!) 152/84    Pulse  Min: 56  Max: 117  (!) 115    Resp  Min: 12  Max: 30  (!) 30    SpO2  Min: 93 %  Max: 99 %  97 %      HT: 5' 6" (167.6 cm)  WT: 50 kg (110 lb 3.7 oz)  BMI: 17.8     FAST: deferred    Medications/transfusions received en-route: none  Medications/transfusions received in trauma bay: ativan tdap     Scheduled Meds:   levETIRAcetam (Keppra) IV (PEDS and ADULTS)  500 mg Intravenous Q12H    LORazepam        mupirocin   Nasal BID    OLANZapine  5 mg Intramuscular Once     Continuous Infusions:   clevidipine       PRN Meds:labetalol, LORazepam    ROS:  CYN due to mentation/ cooperation     Allergies: unable to obtain secondary to acuity of the patient  PMH: unable to obtain secondary to acuity of the patient  PSH: unable to obtain secondary to acuity of the patient  Social history: unable to obtain secondary to acuity of the patient  Objective:   Secondary Survey:   General: Well developed, well nourished, no acute " distress, confused, restless, agitated   Neuro: CNII-XII grossly intact Rt pupil irregular but reactive  HEENT:  Normocephalic, atraumatic, facial asymmetry   CV:  RRR  Pulse: 2+ RP b/l, 2+ DP b/l   Resp/chest:  Non-labored breathing, satting on room air  GI:  Abdomen soft, non-tender, non-distended  :  Normal external  genitalia,  no blood at urethral meatus.   Rectal: Normal tone, no gross blood. incontinent  Extremities: Moves all 4 spontaneously and purposefully, no obvious gross deformities.  Back/Spine: No bony TTP, no palpable step offs or deformities.   Skin/wounds:  Warm, well perfused, abrasion right forearm   Labs:  Lab Results   Component Value Date    WBC 9.40 08/30/2023    HGB 15.0 08/30/2023    HCT 45 08/30/2023    MCV 93.9 08/30/2023     08/30/2023      Istat reviewed with glucose 129, Na 133 k 4.5 Scr 0.6    Imaging:    CT reads pending     Assessment & Plan:   73 year old female presenting via ground EMS. Family states she was noted to be acting differently for the last two hours with slurred speech, possible facial droop and agitation.    CT reviewed with Attending, consistent with hemorrhagic stroke.   Await final reads of CT head/cspine; however, defer care to appropriate medical teams.       Sultana Fernandez, [facility] [place]   Trauma/Acute Care Surgery  [facility]  C: [phone]

## 2023-08-31 NOTE — BRIEF OP NOTE
Name: Bozena Shelton  MRN: [id number]  Age: 73 y.o.  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

## 2023-08-31 NOTE — ED PROVIDER NOTES
Encounter Date: 8/30/2023    SCRIBE #1 NOTE: I, Reymundo Blankenship, am scribing for, and in the presence of,  Gita Ballard MD. I have scribed the following portions of the note - Other sections scribed: HPI, ROS, PE.       History   No chief complaint on file.    73 year old appearing female presents to ED via EMS as level 2 trauma activation for a fall vs CVA onset just PTA. Ems state that pt fell off side of bed but was caught by , deny LOC, deny hitting head, no obvious injuries noted aside from a skin tear to right forearm. EMS reports that pt is confused, having slurred speech, and right sided facial droop, last known normal around 2100 today, unable to perform NIH scale due to pt not cooperating, . EMS report hat pt has no significant pmhx and is not on anticoagulants. Pt does not contribute to history.    The history is provided by the patient. The history is limited by the condition of the patient. No  was used.   Neurologic Problem  The primary symptoms include altered mental status and speech change. Illness onset: last known normal around 2100.   The change in mental status began today. The change in mental status includes confusion and a disturbance of speech.     Review of patient's allergies indicates:  Not on File  No past medical history on file.  No past surgical history on file.  No family history on file.     Review of Systems   Unable to perform ROS: Mental status change   Neurological:  Positive for speech change, facial asymmetry (per EMS) and speech difficulty (per EMS).   Psychiatric/Behavioral:  Positive for confusion (per EMS).        Physical Exam     Initial Vitals [08/30/23 2255]   BP Pulse Resp Temp SpO2   106/88 75 (!) 22 97.8 °F (36.6 °C) 97 %      MAP       --         Physical Exam    Nursing note and vitals reviewed.  Constitutional: She appears well-developed and well-nourished. No distress.   Airway intact   HENT:   Head: Normocephalic and  atraumatic.   Mouth/Throat: Oropharynx is clear and moist.   Eyes: Conjunctivae are normal.   Right pupil is 6 mm and irregular, left pupil is 3 mm   Cardiovascular:  Normal rate, regular rhythm and normal heart sounds.           No murmur heard.  Pulses:       Radial pulses are 2+ on the right side and 2+ on the left side.        Dorsalis pedis pulses are 2+ on the right side and 2+ on the left side.   Pulmonary/Chest: Breath sounds normal. No respiratory distress.   Bilateral breath sounds   Abdominal: Abdomen is soft. She exhibits no distension. There is no abdominal tenderness.   Musculoskeletal:         General: Normal range of motion.      Comments: No tenderness to back, no neck tenderness, chest wall is stable     Neurological: She is alert. GCS eye subscore is 4. GCS verbal subscore is 4. GCS motor subscore is 5.   Moving all extremities   Skin: Skin is warm and dry. No rash noted.   Skin tear to right forearm         ED Course   Critical Care    Date/Time: 8/30/2023 11:13 PM    Performed by: Gita Ballard MD  Authorized by: Gita Ballard MD  Direct patient critical care time: 37 minutes  Additional history critical care time: 1 minutes  Ordering / reviewing critical care time: 6 minutes  Documentation critical care time: 4 minutes  Consulting other physicians critical care time: 7 minutes  Total critical care time (exclusive of procedural time) : 55 minutes  Critical care time was exclusive of separately billable procedures and treating other patients.  Critical care was necessary to treat or prevent imminent or life-threatening deterioration of the following conditions: CNS failure or compromise.  Critical care was time spent personally by me on the following activities: development of treatment plan with patient or surrogate, discussions with consultants, evaluation of patient's response to treatment, examination of patient, obtaining history from patient or surrogate, ordering and review of  laboratory studies, ordering and review of radiographic studies, ordering and performing treatments and interventions, re-evaluation of patient's condition and pulse oximetry.        Labs Reviewed   COMPREHENSIVE METABOLIC PANEL - Abnormal; Notable for the following components:       Result Value    Sodium Level 134 (*)     Glucose Level 130 (*)     Blood Urea Nitrogen 7.6 (*)     All other components within normal limits   CBC WITH DIFFERENTIAL - Abnormal; Notable for the following components:    MCH 32.6 (*)     All other components within normal limits   ISTAT CHEM8 - Abnormal; Notable for the following components:    POC Glucose 129 (*)     POC Sodium 133 (*)     All other components within normal limits   PROTIME-INR - Normal   APTT - Normal   TROPONIN I - Normal   ALCOHOL,MEDICAL (ETHANOL) - Normal   CBC W/ AUTO DIFFERENTIAL    Narrative:     The following orders were created for panel order CBC auto differential.  Procedure                               Abnormality         Status                     ---------                               -----------         ------                     CBC with Differential[[id number]]        Abnormal            Final result                 Please view results for these tests on the individual orders.   ISTAT PROCEDURE     EKG Readings: (Independently Interpreted)   Initial Reading: No STEMI. Rhythm: Normal Sinus Rhythm. Heart Rate: 66. Ectopy: No Ectopy. Conduction: Normal. ST Segments: Normal ST Segments. T Waves: Normal. Axis: Normal. Clinical Impression: Normal Sinus Rhythm   08/31/2023 @ 0016       Imaging Results              CT Cervical Spine Without Contrast       TECHNIQUE:CT OF THE HEAD WAS PERFORMED WITHOUT INTRAVENOUS CONTRAST WITH AXIAL AS WELL AS CORONAL AND SAGITTAL IMAGES.     COMPARISON:NONE.     DOSAGE INFORMATION:AUTOMATED EXPOSURE CONTROL WAS UTILIZED.     CLINICAL HISTORY:AMS, CONFUSED.     Findings:  Hemorrhage:There is a 5.2x4.1cm intraparenchymal  hemorrhage seen in the left temporal lobe on series 3 image 30, with intraventricular extension on series 3 image 32. There is hemorrhage in the left lateral ventricle and temporal horn of the left lateral ventricle. There is mild perilesional edema seen. There is no associated hydrocephalus seen. There is no midline shift or herniation seen. There is associated subarachanoid hemorrhage seen in the left sylvian fissure on series 3 image 25 and in the left temporal region on series 3 image 23,25.  CSF spaces:The ventricles sulci and basal cisterns are within normal limits.  Brain parenchyma:There is preservation of the grey white junction throughout. No acute infarct is identified.  Cerebellum:Unremarkable.  Vascular:Unremarkable venous sinuses.  Sella and skull base:The sella appears to be within normal limits for age.  Cerebellopontine angles:Within normal limits.  Herniation:None.  Intracranial calcifications:Incidental note is made of bilateral choroid plexus calcification. Incidental note is made of some pineal region calcification.  Calvarium:No acute linear or depressed skull fracture is seen.     Maxillofacial Structures:  Paranasal sinuses:The visualized paranasal sinuses appear clear with no mucoperiosteal thickening or air fluid levels identified.  Orbits:The orbits appear unremarkable.  Zygomatic arches:The zygomatic arches are intact and unremarkable.  Temporal bones and mastoids:The temporal bones and mastoids appear unremarkable.  TMJ:The mandibular condyles appear normally placed with respect to the mandibular fossa.  Nasal Bones:The nasal septum is midline.     Notifications:The results were discussed with the emergency room physician Dr. Ballard prior to dictation at 2023-08-30 23:42:18 CDT.        Impression:  1. There is a 5.2x4.1cm intraparenchymal hemorrhage seen in the left temporal lobe on series 3 image 30, with intraventricular extension on series 3 image 32. There is hemorrhage in the left  lateral ventricle and temporal horn of the left lateral ventricle. There is mild perilesional edema seen. There is no associated hydrocephalus seen. There is no midline shift or herniation seen. There is associated subarachanoid hemorrhage seen in the left sylvian fissure on series 3 image 25 and in the left temporal region on series 3 image 23,25.  2. Details and other findings as noted above.                CT Head Without Contrast     TECHNIQUE:CT OF THE CERVICAL SPINE WAS PERFORMED WITHOUT INTRAVENOUS CONTRAST WITH AXIAL AS WELL AS SAGITTAL AND CORONAL IMAGES.     COMPARISON:NONE.     DOSAGE INFORMATION:AUTOMATED EXPOSURE CONTROL WAS UTILIZED.     CLINICAL HISTORY:AMS, CONFUSED.     Findings:  Position:Supine.  Artifact:None.  Lung apices:The visualized lung apices appear unremarkable.  Spine:  Spinal canal:The spinal canal appears unremarkable.  Spinal cord:The spinal cord appears unremarkable.  Mineralization:Within normal limits.  Rotation:No significant rotation is seen.  Scoliosis:No significant scoliosis is seen.  Vertebral Fusion:No vertebral fusion is identified.  Listhesis:No significant listhesis is identified.  Lordosis:The cervical lordosis is maintained.  Intervertebral disc spaces:Multilevel loss of disc height is seen.  Osteophytes:Multilevel endplate osteophytes are seen.  Endplate Sclerosis:Multilevel endplate sclerosis is seen.  Uncovertebral degenerative changes:Multilevel uncovertebral joint arthrosis is seen.  Facet degenerative changes:Multilevel facet degenerative changes are seen.  Calcifications:None.  Fractures:No acute cervical spine fracture dislocation or subluxation is seen.  Orthopedic Hardware:None.     Miscellaneous:  Mastoid air cells:The visualized mastoid air cells appear clear.  Soft Tissues:Unremarkable.        Impression:  1. No acute cervical spine fracture dislocation or subluxation is seen.  2. Degenerative changes and other details as above.                  CTA Head  and Neck (xpd) (Preliminary result)  Result time 08/30/23 23:41:55      Preliminary result by Horacio Bassett Jr., MD (08/30/23 23:41:55)                   Narrative:    START OF REPORT:  TECHNIQUE: CT ANGIOGRAM OF THE NECK VESSELS WAS PERFORMED WITH INTRAVENOUS CONTRAST WITH DIRECT AXIAL AS WELL AS SAGITTAL AND CORONAL REFORMATIONS.    COMPARISON: NONE.    CLINICAL HISTORY: AMS, CONFUSED, EVAL VASCULAR INJURY.    Findings:  Intracranial Vascular structures:  Internal carotid arteries: Unremarkable.  Middle cerebral arteries: Unremarkable.  Anterior cerebral arteries: Unremarkable.  Vertebral arteries: The left vertebral artery is dominant.  Basilar artery: Unremarkable.  Posterior cerebral arteries: Unremarkable.  Posterior communicating arteries: Unremarkable.  Jugular Veins and venous sinuses: Unremarkable.  Common carotid arteries:  Right common carotid artery: Moderate of the distal segment of the right common carotid artery is seen with mild stenosis.  Left common carotid artery: Moderate of the distal segment of the left common carotid artery is seen.  Internal carotid artery: Mild atheromatous calcification with stenosis at the origin of the internal carotid artery is seen. Of the cavernous segment of the bilateral internal carotid artery is seen.  Vertebral arteries: Unremarkable.  Jugular Veins and venous sinuses: Unremarkable.  Brain parenchyma: No abnormal intracranial enhancement is seen on the post contrast images.      Impression:  1. No acute angiographic abnormality in the CT angiogram of the head and neck with no aneurysm dissection or occlusion identified. Details and other findings as described above.                                         Medications   LORazepam (ATIVAN) 2 mg/mL injection (  Not Given 8/30/23 1929)   clevidipine (CLEVIPREX) 25 mg/50 mL infusion (has no administration in time range)   labetaloL injection 10 mg (has no administration in time range)   levETIRAcetam (Keppra)  500 mg in dextrose 5 % in water (D5W) 100 mL IVPB (MB+) (has no administration in time range)   mupirocin 2 % ointment (has no administration in time range)   LORazepam injection (1 mg Intravenous Given 8/30/23 2310)   Tdap (BOOSTRIX) vaccine injection 0.5 mL (0.5 mLs Intramuscular Given 8/30/23 2313)   sodium chloride 0.9% bolus 1,000 mL 1,000 mL (1,000 mLs Intravenous New Bag 8/30/23 2330)   levETIRAcetam in NaCl (iso-os) IVPB 1,000 mg (1,000 mg Intravenous New Bag 8/31/23 0000)   iopamidoL (ISOVUE-370) injection 50 mL (50 mLs Intravenous Given 8/30/23 2354)   fentaNYL injection 50 mcg (50 mcg Intravenous Given 8/31/23 0000)   ondansetron injection 8 mg (8 mg Intravenous Given 8/31/23 0000)   OLANZapine injection 5 mg (5 mg Intramuscular Given 8/31/23 0354)   LORazepam injection 1 mg (1 mg Intravenous Given 8/31/23 0517)     Medical Decision Making  Problems Addressed:  Altered mental status: acute illness or injury  Hemorrhagic cerebrovascular accident (CVA): acute illness or injury that poses a threat to life or bodily functions  Skin tear of right forearm without complication, initial encounter: acute illness or injury    Amount and/or Complexity of Data Reviewed  Labs: ordered.  Radiology: ordered.    Risk  Prescription drug management.  Decision regarding hospitalization.              Scribe Attestation:   Scribe #1: I performed the above scribed service and the documentation accurately describes the services I performed. I attest to the accuracy of the note.    Attending Attestation:           Physician Attestation for Scribe:  Physician Attestation Statement for Scribe #1: I, Gita Ballard MD, reviewed documentation, as scribed by Reymundo Blankenship in my presence, and it is both accurate and complete.           ED assessment:    Ms. Shelton presented, activated as a trauma as family had reported fall; however, no head injury sustained.  Presentation more consistent with likely acute CVA.  Expeditious  head CT obtained.    Differential diagnosis (including but not limited to):   Ischemic CVA, hemorrhagic CVA minor head injury, seizure, hypoglycemia, acute encephalopathy, urinary tract infection, acute kidney injury, forearm skin tear    ED management:   See ED course below.  CT head demonstrates intraparenchymal hemorrhage consistent with hemorrhagic CVA.  Case discussed with neurology, neurosurgery, radiology , critical care and Trauma Service.  Will admit to ICU for    My independent radiology interpretation:   CT head without IV contrast:  Large left posterior parietal hemorrhage, no midline shift   Chest x-ray: No displaced rib fracture, no pneumothorax or focal infiltrate or effusion   X-ray pelvis:  No acute fracture subluxation       Amount and/or Complexity of Data Reviewed  Independent historian: EMS   Summary of history:  Ems states that pt fell off side of bed but was caught by , deny LOC, deny hitting head, no obvious injuries noted aside from a skin tear to right forearm. EMS reports that pt is confused, having slurred speech, and right sided facial droop last known normal around 2100 today, unable to perform NIH scale due to pt not cooperating, . EMS report hat pt has no significant pmhx and is not on anticoagulants.     External data reviewed: no prior records available for review   Summary of data reviewed:   Risk and benefits of testing: discussed   Labs: ordered and reviewed  Radiology: ordered and independent interpretation performed (see above or ED course)  ECG/medicine tests: ordered and independent interpretation performed (see above or ED course)  Discussion of management or test interpretation with external provider(s): discussed with Neurosurgery, stroke Neurology, intensive care, trauma surgery consultant and discussed with radiologist   Summary of discussion:  Evaluated at bedside on presentation by Trauma surgery NP, agrees CT scan consistent with hemorrhagic  CVA/nontraumatic etiology.  Staffed with the attending and okay with Trauma Service signing off and admit to medicine service.  Discussed with Neurosurgery on-call, recommendations as below.  Discussed with neurology on-call, as below.  Discussed with ICU resident who accepts for admission.    Risk  Parenteral controlled substances   Drug therapy requiring intense monitoring for toxicity   Decision regarding hospitalization    Critical Care  30-74 minutes     Gita FRAGA MD personally performed the history, PE, MDM, and procedures as documented above and agree with the scribe's documentation.         ED Course as of 08/31/23 0529   Wed Aug 30, 2023   2329 Discussed with Dr. Birch, on-call for Neurosurgery, agrees with BP control, Cleviprex to keep blood pressure less than 140, repeat head CT in 6 hours, Keppra load now. [KS]   2340 Consultation to Dr. Mitchell, on-call for stroke Neurology as well, he is currently intra procedure with a neurointerventional case, will review imaging and call back with further recommendations once able. [KS]   Thu Aug 31, 2023   0121 Discussed with Dr. Mitchell.  Agrees with interventions thus far, will follow in consultation [KS]      ED Course User Index  [KS] Gita Ballard MD                    Clinical Impression:   Final diagnoses:  [R41.82] Altered mental status  [I61.9] Hemorrhagic cerebrovascular accident (CVA)  [I63.9] Stroke  [S51.811A] Skin tear of right forearm without complication, initial encounter (Primary)        ED Disposition Condition    Admit Stable                Gita Ballard MD  08/31/23 0529

## 2023-08-31 NOTE — ANESTHESIA PROCEDURE NOTES
Intubation    Date/Time: 8/31/2023 11:43 AM    Performed by: Kleber Squires CRNA  Authorized by: Zarina Estevez MD    Intubation:     Induction:  Intravenous    Intubated:  Postinduction    Mask Ventilation:  Easy mask    Attempts:  1    Attempted By:  CRNA    Method of Intubation:  Direct    Blade:  Tolbert 2    Laryngeal View Grade: Grade I - full view of cords      Difficult Airway Encountered?: No      Complications:  None    Airway Device:  Oral endotracheal tube    Airway Device Size:  7.5    Style/Cuff Inflation:  Cuffed (inflated to minimal occlusive pressure)    Tube secured:  21    Secured at:  The lips    Placement Verified By:  Capnometry    Complicating Factors:  None and anterior larynx    Findings Post-Intubation:  BS equal bilateral and atraumatic/condition of teeth unchanged

## 2023-08-31 NOTE — SUBJECTIVE & OBJECTIVE
No past medical history on file.    No past surgical history on file.    Review of patient's allergies indicates:  Not on File    Current Neurological Medications:     No current facility-administered medications on file prior to encounter.     No current outpatient medications on file prior to encounter.     Family History    None       Tobacco Use    Smoking status: Not on file    Smokeless tobacco: Not on file   Substance and Sexual Activity    Alcohol use: Not on file    Drug use: Not on file    Sexual activity: Not on file     Review of Systems   Unable to perform ROS: Patient nonverbal     Objective:     Vital Signs (Most Recent):  Temp: 98.4 °F (36.9 °C) (08/31/23 0405)  Pulse: 104 (08/31/23 0600)  Resp: 18 (08/31/23 0600)  BP: (!) 133/93 (08/31/23 0600)  SpO2: 95 % (08/31/23 0600) Vital Signs (24h Range):  Temp:  [97.8 °F (36.6 °C)-98.8 °F (37.1 °C)] 98.4 °F (36.9 °C)  Pulse:  [] 104  Resp:  [12-30] 18  SpO2:  [93 %-99 %] 95 %  BP: (106-152)/() 133/93     Weight: 50 kg (110 lb 3.7 oz)  Body mass index is 17.79 kg/m².     Physical Exam  Vitals and nursing note reviewed.   Constitutional:       General: She is not in acute distress.     Appearance: She is not toxic-appearing.   HENT:      Head: Normocephalic.   Eyes:      General: No visual field deficit.     Pupils: Pupils are unequal.      Right eye: Pupil is not round. Pupil is reactive.      Left eye: Pupil is round and reactive.   Pulmonary:      Effort: Pulmonary effort is normal.   Musculoskeletal:         General: Normal range of motion.      Right lower leg: No edema.      Left lower leg: No edema.   Skin:     General: Skin is warm and dry.      Capillary Refill: Capillary refill takes less than 2 seconds.   Neurological:      Mental Status: She is lethargic.      Cranial Nerves: Dysarthria present. No facial asymmetry.      Motor: Weakness (RUE 2/5, 4/5 RLE, 5/5 LUE, LLE) present. No tremor, atrophy, abnormal muscle tone, seizure activity  or pronator drift.      Comments:     Very limited exam, does not participate in exam   Expressive and receptive aphasia  Appears to spontaneously move LUE      Psychiatric:         Behavior: Behavior is uncooperative.          NEUROLOGICAL EXAMINATION:     CRANIAL NERVES     CN III, IV, VI   Pupils: unequal      Significant Labs:   Recent Lab Results  (Last 5 results in the past 24 hours)        08/31/23  0436   08/31/23  0138   08/30/23  2318   08/30/23  2318   08/30/23  2316        Phencyclidine   Negative             Albumin/Globulin Ratio 1.4       1.4         Albumin 4.0       4.0         Alcohol, Serum       <10.0         Alkaline Phosphatase 69       72         ALT 12       11         Amphetamine Screen, Ur   Negative             Appearance, UA   Clear             aPTT       26.7  Comment: For Minimal Heparin Infusion, the goal aPTT 64-85 seconds corresponds to an anti-Xa of 0.3-0.5.    For Low Intensity and High Intensity Heparin, the goal aPTT  seconds corresponds to an anti-Xa of 0.3-0.7         AST 22       17         Bacteria, UA   None Seen             Barbiturate Screen, Ur   Negative             Baso # 0.04       0.04         Basophil % 0.3       0.4         Benzodiazepine Screen, Urine   Negative             BILIRUBIN TOTAL 0.8       0.6         Bilirubin, UA   Negative             BUN 6.6       7.6         Calcium 8.9       9.3         Cannabinoids, Urine   Negative             Chloride 100       99         CO2 24       24         Cocaine (Metab.)   Negative             Color, UA   Yellow             Creatinine 0.63       0.67         eGFR >60       >60         Eos # 0.00       0.01         Eosinophil % 0.0       0.1         Fentanyl, Urine   Negative             Globulin, Total 2.8       2.9         Glucose 143       130         Glucose, UA   Negative             Hematocrit 42.6       43.2         Hemoglobin 14.7       15.0         Immature Grans (Abs) 0.05       0.03         Immature  Granulocytes 0.4       0.3         INR       1.1         Ketones, UA   Negative             Leukocytes, UA   Negative             Lymph # 0.82       1.02         LYMPH % 6.8       10.9         Magnesium 1.70               MCH 32.6       32.6         MCHC 34.5       34.7         MCV 94.5       93.9         MDMA, Urine   Negative             Mono # 0.82       0.54         Mono % 6.8       5.7         MPV 10.2       9.8         Neut # 10.40       7.76         Neut % 85.7       82.6         NITRITE UA   Negative             nRBC 0.0       0.0         Occult Blood UA   Negative             Opiate Scrn, Ur   Negative             pH, UA   7.5             pH, Urine   7.5             Phosphorus 2.7               Platelets 181       174         POC Anion Gap     14           POC BUN     8           POC Chloride     97           POC Creatinine     0.6           POC Glucose     129           POC Hematocrit     45           POC HEMOGLOBIN     15           POC Ionized Calcium     1.18           Potassium, Blood Gas     4.5           POC PTINR         1.1       POC PTWBT         13.5       Sodium, Blood Gas     133           POC TCO2 (MEASURED)     27           Potassium 3.8       4.0         PROTEIN TOTAL 6.8       6.9         Protein, UA   Negative             Protime       13.9         RBC 4.51       4.60         RBC, UA   None Seen             RDW 13.3       13.3         Sample     VENOUS     CAPILLARY       Sodium 133       134         Specific Gravity,UA   1.023             Specific Gravity, Urine Auto   1.023             Squam Epithel, UA   0-4             Troponin I       <0.010         Urobilinogen, UA   0.2             WBC, UA   0-5             WBC 12.13       9.40                                Significant Imaging: I have reviewed all pertinent imaging results/findings within the past 24 hours.

## 2023-08-31 NOTE — ED NOTES
CT scan abnormal isabella notified and she came to CT to assess.  Keep sbp less than 140 and she is calling [facility] for neuro now.

## 2023-08-31 NOTE — SEDATION DOCUMENTATION
Report to [facility]. States will call Dr South when  arrives for update.

## 2023-08-31 NOTE — CONSULTS
[facility] - [facility] ICU  Neurosurgery  Consult Note    Inpatient consult to Neurosurgery  Consult performed by: Ally Mccoy PA  Consult ordered by: Gita Ballard MD        Subjective:     Chief Complaint/Reason for Admission: [facility]    History of Present Illness: Patient is a 73 year old female with unknown past medical history, who presented to the ED on 8/30 as a level 2 trauma activation for fall. EMS reported that she fell off of the side of the bed and was caught by her , no LOC, denies hitting head. EMS reported last known normal 2100, on scene slurred speech and right sided facial droop. CT head showed an intraparenchymal hematoma in the left temporal lobe with intraventricular extension and subarachnoid hemorrhage of the left temporal lobe and in the left sylvian fissure. CTA head and neck showed possible small vascular malformation along the inferior margin of the left cerebral hematoma; no large vessel occlusion or flow-limiting stenosis. BP upon arrival was 106/88. Dr. Birch was consulted for evaluation and treatment recommendations.    On PE this am she is currently restrained d/t agitation. She is moving all around the bed. She does not open her eyes to noxious stimuli. She is non verbal and is not following commands. She is moving bilateral UE and left LE constantly; intermittently moves the right leg.    No medications prior to admission.       Review of patient's allergies indicates:  Not on File    No past medical history on file.  No past surgical history on file.  Family History    None       Tobacco Use    Smoking status: Not on file    Smokeless tobacco: Not on file   Substance and Sexual Activity    Alcohol use: Not on file    Drug use: Not on file    Sexual activity: Not on file     Review of Systems  Objective:   Unable to obtain d/t AMS    Weight: 50 kg (110 lb 3.7 oz)  Body mass index is 17.79 kg/m².  Vital Signs (Most Recent):  Temp: 98.7 °F (37.1 °C)  "(08/31/23 0800)  Pulse: 80 (08/31/23 0900)  Resp: 17 (08/31/23 0900)  BP: 121/71 (08/31/23 0900)  SpO2: (!) 76 % (08/31/23 0900) Vital Signs (24h Range):  Temp:  [97.8 °F (36.6 °C)-98.8 °F (37.1 °C)] 98.7 °F (37.1 °C)  Pulse:  [] 80  Resp:  [12-30] 17  SpO2:  [76 %-99 %] 76 %  BP: (106-153)/() 121/71                              Urethral Catheter 08/31/23 0130 Temperature probe 16 Fr. (Active)   Site Assessment Clean;Intact;Dry 08/31/23 0744   Collection Container Urimeter 08/31/23 0744   Securement Method secured to top of thigh w/ adhesive device 08/31/23 0744   Catheter Care Performed yes 08/31/23 0744   Reason for Continuing Urinary Catheterization Critically ill in ICU and requiring hourly monitoring of intake/output 08/31/23 0744   CAUTI Prevention Bundle Securement Device in place with 1" slack;Intact seal between catheter & drainage tubing;Drainage bag/urimeter off the floor;Sheeting clip in use;No dependent loops or kinks;Drainage bag/urimeter not overfilled (<2/3 full);Drainage bag/urimeter below bladder 08/31/23 0744   Output (mL) 800 mL 08/31/23 0600       Neurosurgery Physical Exam  She is satting on room air, BP currently controlled  Patient given ativan for agitation 5 hrs prior to rounds  Left pupil 3-2 brisk; right eye with pupil abnormality  She did not open her eyes to sternal rub. Periodically mumbling incomprehensible words. Spontaneously moving all ext, slower on the right. Localizes to pain  E: 1, V: 2, M: 5, GCS 8 currently    Significant Labs:  Recent Labs   Lab 08/30/23 2318 08/31/23  0436   * 133*   K 4.0 3.8   CO2 24 24   BUN 7.6* 6.6*   CREATININE 0.67 0.63   CALCIUM 9.3 8.9   MG  --  1.70     Recent Labs   Lab 08/30/23 2318 08/30/23 2318 08/31/23  0436   WBC 9.40  --  12.13*   HGB 15.0  --  14.7   HCT 43.2 45 42.6     --  181     Recent Labs   Lab 08/30/23 2316 08/30/23 2318   INR 1.1 1.1     Microbiology Results (last 7 days)       ** No results found for " the last 168 hours. **            Significant Diagnostics:  CT Head Without Contrast [[id number]] Resulted: 08/31/23 0836   Order Status: Completed Updated: 08/31/23 0838   Narrative:     EXAMINATION:   CT HEAD WITHOUT CONTRAST     CLINICAL HISTORY:   Stroke, follow up;     TECHNIQUE:   Low dose axial CT images obtained throughout the head without intravenous contrast.  Axial, sagittal and coronal reconstructions were performed and interpreted.     DLP: 1115 mGycm     All CT scans at this location are performed using dose optimization techniques as appropriate to a performed exam including the following automated exposure control, adjustment of the mA and/or kV according to patient size and/or use of iterative reconstruction technique     COMPARISON:   CT head 08/30/2023     FINDINGS:   BRAIN: Intraparenchymal hemorrhage at the left parietal lobe is again seen estimated to measure 4.7 x 4.7 cm in axial diameter; previously 5 x 4.7 cm.  There is mild surrounding edema, similar to previous with resultant sulcal crowding.  No midline shift.     VENTRICLES: There is interventricular extension of hemorrhage at the left lateral ventricle similar to prior.  Hemorrhage is seen layering dependently within the bilateral occipital horns.  There is mild mass effect on the left lateral ventricle.  There is dilatation of the temporal horn of the left ventricle, similar to previous.     EXTRA-AXIAL: There is subarachnoid extension of hemorrhage at the left sylvian fissure, similar to previous.  The basilar cisterns are patent.  No herniation.     BONES: Calvarium is intact.     SINUSES AND MASTOIDS: Secretions at the left sphenoid sinus.  Left mastoid effusion.  Left middle ear effusion.     OTHER: Post treatment change at the left globe.    Impression:       Very similar appearance to previous exam with left parietal intraparenchymal hemorrhage with intraventricular and subarachnoid extension of hemorrhage.     Similar vasogenic  edema with local mass effect on the left lateral ventricle with very mild dilatation of the temporal horn on the left.        Assessment/Plan:     Active Diagnoses:    Diagnosis Date Noted POA    Intracerebral hemorrhage [I61.9] 08/31/2023 Yes      Problems Resolved During this Admission:     She is GCS 8 during my exam  Repeat CT head completed this am is stable  We will continue ICU care with Q1 hour neuro exams  Continue BP parameters below 140/90  We will begin hypertonic saline at 50cc/hr with Na goal 145  Keppra BID  SCDs for DVT prophylaxis; hold anticoagulants    Thank you for your consult. I will follow-up with patient. Please contact us if you have any additional questions.    PRINCE Reyes  Neurosurgery  [facility] - [facility] ICU

## 2023-08-31 NOTE — TRANSFER OF CARE
"Anesthesia Transfer of Care Note    Patient: Peg Nikita    Procedure(s) Performed: * No procedures listed *    Patient location: ICU    Anesthesia Type: general    Transport from OR: Transported from OR intubated on 100% O2 by AMBU with adequate controlled ventilation. Upon arrival to PACU/ICU, patient attached to ventilator and auscultated to confirm bilateral breath sounds and adequate TV. Continuous ECG monitoring in transport. Continuous SpO2 monitoring in transport    Post pain: adequate analgesia    Post assessment: no apparent anesthetic complications    Post vital signs: stable    Level of consciousness: sedated    Nausea/Vomiting: no nausea/vomiting    Complications: none    Transfer of care protocol was followedComments: Report given to KALYAN Adam      Last vitals:   Visit Vitals  /63   Pulse 72   Temp 37.1 °C (98.7 °F)   Resp 15   Ht 5' 6" (1.676 m)   Wt 50 kg (110 lb 3.7 oz)   SpO2 (!) 89%   BMI 17.79 kg/m²     "

## 2023-08-31 NOTE — ED NOTES
Pt arrived via ems stretcher, pt calm in stretcher until trying to get pt over to ED stretcher and became agitated and uncooperative with staff and MD assessment.  With staff repeating what assessments where for pt conntinued to yell stop and have unsensible speech.

## 2023-08-31 NOTE — HPI
73 year old female with unknown past medical history presented to ED on 8/30 as a level 2 trauma activation for fall. EMS reported that she fell off of the side of the bed and was caught by her , no LOC, denies hitting head. EMS reported last known normal 2100, on scene slurred speech and right sided facial droop. CT head showed an intraparenchymal hematoma in the left temporal lobe with intraventricular extension and subarachnoid hemorrhage of the left temporal lobe and in the left sylvian fissure. CTA head and neck was negative for aneurysm. BP upon arrival 106/88. Neurology was consulted for hemorrhagic stroke.

## 2023-08-31 NOTE — CONSULTS
[facility] - [facility] ICU  Neurology  Consult Note    Patient Name: Bzoena Shelton  MRN: [id number]  Admission Date: 8/30/2023  Hospital Length of Stay: 1 days  Code Status: No Order   Attending Provider: Roberto Biswas Jr., MD,*   Consulting Provider: Dawna Thomas NP  Primary Care Physician: No, Primary Doctor  Principal Problem:<principal problem not specified>    Inpatient consult to Vascular (Stroke) Neurology  Consult performed by: Dawna Thomas NP  Consult ordered by: Gita Ballard MD         Subjective:     Chief Complaint:  Aphasia, right sided weakness      HPI:   73 year old female with unknown past medical history presented to ED on 8/30 as a level 2 trauma activation for fall. EMS reported that she fell off of the side of the bed and was caught by her , no LOC, denies hitting head. EMS reported last known normal 2100, on scene slurred speech and right sided facial droop. CT head showed an intraparenchymal hematoma in the left temporal lobe with intraventricular extension and subarachnoid hemorrhage of the left temporal lobe and in the left sylvian fissure. CTA head and neck was negative for aneurysm. BP upon arrival 106/88. Neurology was consulted for hemorrhagic stroke.            No past medical history on file.    No past surgical history on file.    Review of patient's allergies indicates:  Not on File    Current Neurological Medications:     No current facility-administered medications on file prior to encounter.     No current outpatient medications on file prior to encounter.     Family History    None       Tobacco Use    Smoking status: Not on file    Smokeless tobacco: Not on file   Substance and Sexual Activity    Alcohol use: Not on file    Drug use: Not on file    Sexual activity: Not on file     Review of Systems   Unable to perform ROS: Patient nonverbal     Objective:     Vital Signs (Most Recent):  Temp: 98.4 °F (36.9 °C) (08/31/23 0405)  Pulse: 104  (08/31/23 0600)  Resp: 18 (08/31/23 0600)  BP: (!) 133/93 (08/31/23 0600)  SpO2: 95 % (08/31/23 0600) Vital Signs (24h Range):  Temp:  [97.8 °F (36.6 °C)-98.8 °F (37.1 °C)] 98.4 °F (36.9 °C)  Pulse:  [] 104  Resp:  [12-30] 18  SpO2:  [93 %-99 %] 95 %  BP: (106-152)/() 133/93     Weight: 50 kg (110 lb 3.7 oz)  Body mass index is 17.79 kg/m².     Physical Exam  Vitals and nursing note reviewed.   Constitutional:       General: She is not in acute distress.     Appearance: She is not toxic-appearing.   HENT:      Head: Normocephalic.   Eyes:      General: No visual field deficit.     Pupils: Pupils are unequal.      Right eye: Pupil is not round. Pupil is reactive.      Left eye: Pupil is round and reactive.   Pulmonary:      Effort: Pulmonary effort is normal.   Musculoskeletal:         General: Normal range of motion.      Right lower leg: No edema.      Left lower leg: No edema.   Skin:     General: Skin is warm and dry.      Capillary Refill: Capillary refill takes less than 2 seconds.   Neurological:      Mental Status: She is lethargic.      Cranial Nerves: Dysarthria present. No facial asymmetry.      Motor: Weakness (RUE 2/5, 4/5 RLE, 5/5 LUE, LLE) present. No tremor, atrophy, abnormal muscle tone, seizure activity or pronator drift.      Comments:     Very limited exam, does not participate in exam   Expressive and receptive aphasia  Appears to spontaneously move LUE      Psychiatric:         Behavior: Behavior is uncooperative.          NEUROLOGICAL EXAMINATION:     CRANIAL NERVES     CN III, IV, VI   Pupils: unequal      Significant Labs:   Recent Lab Results  (Last 5 results in the past 24 hours)        08/31/23  0436   08/31/23  0138   08/30/23  2318   08/30/23  2318   08/30/23  2316        Phencyclidine   Negative             Albumin/Globulin Ratio 1.4       1.4         Albumin 4.0       4.0         Alcohol, Serum       <10.0         Alkaline Phosphatase 69       72         ALT 12       11          Amphetamine Screen, Ur   Negative             Appearance, UA   Clear             aPTT       26.7  Comment: For Minimal Heparin Infusion, the goal aPTT 64-85 seconds corresponds to an anti-Xa of 0.3-0.5.    For Low Intensity and High Intensity Heparin, the goal aPTT  seconds corresponds to an anti-Xa of 0.3-0.7         AST 22       17         Bacteria, UA   None Seen             Barbiturate Screen, Ur   Negative             Baso # 0.04       0.04         Basophil % 0.3       0.4         Benzodiazepine Screen, Urine   Negative             BILIRUBIN TOTAL 0.8       0.6         Bilirubin, UA   Negative             BUN 6.6       7.6         Calcium 8.9       9.3         Cannabinoids, Urine   Negative             Chloride 100       99         CO2 24       24         Cocaine (Metab.)   Negative             Color, UA   Yellow             Creatinine 0.63       0.67         eGFR >60       >60         Eos # 0.00       0.01         Eosinophil % 0.0       0.1         Fentanyl, Urine   Negative             Globulin, Total 2.8       2.9         Glucose 143       130         Glucose, UA   Negative             Hematocrit 42.6       43.2         Hemoglobin 14.7       15.0         Immature Grans (Abs) 0.05       0.03         Immature Granulocytes 0.4       0.3         INR       1.1         Ketones, UA   Negative             Leukocytes, UA   Negative             Lymph # 0.82       1.02         LYMPH % 6.8       10.9         Magnesium 1.70               MCH 32.6       32.6         MCHC 34.5       34.7         MCV 94.5       93.9         MDMA, Urine   Negative             Mono # 0.82       0.54         Mono % 6.8       5.7         MPV 10.2       9.8         Neut # 10.40       7.76         Neut % 85.7       82.6         NITRITE UA   Negative             nRBC 0.0       0.0         Occult Blood UA   Negative             Opiate Scrn, Ur   Negative             pH, UA   7.5             pH, Urine   7.5             Phosphorus 2.7                Platelets 181       174         POC Anion Gap     14           POC BUN     8           POC Chloride     97           POC Creatinine     0.6           POC Glucose     129           POC Hematocrit     45           POC HEMOGLOBIN     15           POC Ionized Calcium     1.18           Potassium, Blood Gas     4.5           POC PTINR         1.1       POC PTWBT         13.5       Sodium, Blood Gas     133           POC TCO2 (MEASURED)     27           Potassium 3.8       4.0         PROTEIN TOTAL 6.8       6.9         Protein, UA   Negative             Protime       13.9         RBC 4.51       4.60         RBC, UA   None Seen             RDW 13.3       13.3         Sample     VENOUS     CAPILLARY       Sodium 133       134         Specific Gravity,UA   1.023             Specific Gravity, Urine Auto   1.023             Squam Epithel, UA   0-4             Troponin I       <0.010         Urobilinogen, UA   0.2             WBC, UA   0-5             WBC 12.13       9.40                                Significant Imaging: I have reviewed all pertinent imaging results/findings within the past 24 hours.    Assessment and Plan:     Intracerebral hemorrhage  Intracerebral hemorrhage and traumatic SAH    -CTA head and neck: No acute angiographic abnormality in the CT angiogram of the head and neck with no aneurysm dissection or occlusion identified. Details and other findings as described above.    8/31/23 (5:40 am)  -CT head:   1. There is a stable appearing intraparenchymal hematoma in the left temporal lobe measuring 4.5 x 4.5 cm. There is intraventricular extension of bleed with mild intraventricular hemorrhage layering in the dependent portion of the occipital horns of bilateral lateral ventricles. There is perilesional edema causing mass effect in the form of effacement of adjacent sulcal spaces and compression on the left lateral ventricle. There is stable appearing subarachnoid hemorrhage in the sulcal spaces of  the left temporal lobe and in the left sylvian fissure.  2. Details and other findings as noted above.    -Recommend SBP less than 140  -Q1 hour neuro checks  -Avoid antiplatelets/anticoagulation  -Seizure precautions   -neurosurgery following, no surgical intervention  -could consider repeat CT head tomorrow  -could consider MRI eventually (does not need to be done now, will discuss with MD during rounds, could be done in the future, unlikely she will be cooperative for MRI at this time)          VTE Risk Mitigation (From admission, onward)      None            Thank you for your consult.  Further recommendations to follow from MD Dawna Thomas, NP  Neurology  [facility] - [facility] ICU      1600  Rounded with Dr. 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

## 2023-08-31 NOTE — ED NOTES
Pt spine asssessed per md when pt was turning herself in bed. No stepoff or deformity noted. No changes to neuro after assessment

## 2023-08-31 NOTE — ANESTHESIA PREPROCEDURE EVALUATION
08/31/2023  Bozena Shelton is a 73 y.o., female.   presented to the ED on 8/30 as a level 2 trauma activation for fall. EMS reported that she fell off of the side of the bed and was caught by her , no LOC, denies hitting head. EMS reported last known normal 2100, on scene slurred speech and right sided facial droop. CT head showed an intraparenchymal hematoma in the left temporal lobe with intraventricular extension and subarachnoid hemorrhage of the left temporal lobe and in the left sylvian fissure. CTA head and neck showed possible small vascular malformation along the inferior margin of the left cerebral hematoma; no large vessel occlusion or flow-limiting stenosis.     Pre-op Assessment    I have reviewed the Patient Summary Reports.     I have reviewed the Nursing Notes. I have reviewed the NPO Status.   I have reviewed the Medications.     Review of Systems  Anesthesia Hx:  No problems with previous Anesthesia        Physical Exam  General: Well nourished, Combative and Confusion    Airway:  Mallampati: II   Mouth Opening: Normal  TM Distance: Normal  Tongue: Normal  Neck ROM: Normal ROM    Dental:  Intact, Periodontal disease        Anesthesia Plan  Type of Anesthesia, risks & benefits discussed:    Anesthesia Type: Gen ETT  Intra-op Monitoring Plan: Standard ASA Monitors and Art Line  Post Op Pain Control Plan: multimodal analgesia and IV/PO Opioids PRN  Airway Plan: Direct, Post-Induction  Informed Consent: Informed consent signed with the Patient and all parties understand the risks and agree with anesthesia plan.  All questions answered. Patient consented to blood products? Yes  ASA Score: 3  Day of Surgery Review of History & Physical: H&P Update referred to the surgeon/provider.    Ready For Surgery From Anesthesia Perspective.     .

## 2023-09-01 ENCOUNTER — ANESTHESIA (OUTPATIENT)
Dept: INTERVENTIONAL RADIOLOGY/VASCULAR | Facility: HOSPITAL | Age: 73
DRG: 064 | End: 2023-09-01
Payer: MEDICARE

## 2023-09-01 LAB
ALBUMIN SERPL-MCNC: 3.6 G/DL (ref 3.4–4.8)
ALBUMIN/GLOB SERPL: 1.4 RATIO (ref 1.1–2)
ALLENS TEST BLOOD GAS (OHS): ABNORMAL
ALP SERPL-CCNC: 64 UNIT/L (ref 40–150)
ALT SERPL-CCNC: 17 UNIT/L (ref 0–55)
AST SERPL-CCNC: 33 UNIT/L (ref 5–34)
BASE EXCESS BLD CALC-SCNC: -2.2 MMOL/L
BASOPHILS # BLD AUTO: 0.02 X10(3)/MCL
BASOPHILS NFR BLD AUTO: 0.2 %
BILIRUB SERPL-MCNC: 0.9 MG/DL
BLOOD GAS SAMPLE TYPE (OHS): ABNORMAL
BUN SERPL-MCNC: 4 MG/DL (ref 9.8–20.1)
CA-I BLD-SCNC: 1.11 MMOL/L (ref 1.12–1.23)
CALCIUM SERPL-MCNC: 8.2 MG/DL (ref 8.4–10.2)
CHLORIDE SERPL-SCNC: 106 MMOL/L (ref 98–107)
CO2 BLDA-SCNC: 22.1 MMOL/L
CO2 SERPL-SCNC: 20 MMOL/L (ref 23–31)
CREAT SERPL-MCNC: 0.47 MG/DL (ref 0.55–1.02)
DRAWN BY BLOOD GAS (OHS): ABNORMAL
EOSINOPHIL # BLD AUTO: 0.01 X10(3)/MCL (ref 0–0.9)
EOSINOPHIL NFR BLD AUTO: 0.1 %
ERYTHROCYTE [DISTWIDTH] IN BLOOD BY AUTOMATED COUNT: 13.2 % (ref 11.5–17)
EST. AVERAGE GLUCOSE BLD GHB EST-MCNC: 108.3 MG/DL
FIO2 BLOOD GAS (OHS): 40 %
GFR SERPLBLD CREATININE-BSD FMLA CKD-EPI: >60 MLS/MIN/1.73/M2
GLOBULIN SER-MCNC: 2.5 GM/DL (ref 2.4–3.5)
GLUCOSE SERPL-MCNC: 121 MG/DL (ref 82–115)
HBA1C MFR BLD: 5.4 %
HCO3 BLDA-SCNC: 21.1 MMOL/L
HCT VFR BLD AUTO: 43.8 % (ref 37–47)
HGB BLD-MCNC: 15.1 G/DL (ref 12–16)
IMM GRANULOCYTES # BLD AUTO: 0.03 X10(3)/MCL (ref 0–0.04)
IMM GRANULOCYTES NFR BLD AUTO: 0.3 %
LYMPHOCYTES # BLD AUTO: 0.67 X10(3)/MCL (ref 0.6–4.6)
LYMPHOCYTES NFR BLD AUTO: 5.9 %
MAGNESIUM SERPL-MCNC: 1.6 MG/DL (ref 1.6–2.6)
MCH RBC QN AUTO: 32 PG (ref 27–31)
MCHC RBC AUTO-ENTMCNC: 34.5 G/DL (ref 33–36)
MCV RBC AUTO: 92.8 FL (ref 80–94)
MONOCYTES # BLD AUTO: 0.56 X10(3)/MCL (ref 0.1–1.3)
MONOCYTES NFR BLD AUTO: 4.9 %
NEUTROPHILS # BLD AUTO: 10.12 X10(3)/MCL (ref 2.1–9.2)
NEUTROPHILS NFR BLD AUTO: 88.6 %
NRBC BLD AUTO-RTO: 0 %
OXYGEN DEVICE BLOOD GAS (OHS): ABNORMAL
PCO2 BLDA: 31 MMHG (ref 35–45)
PEEP (OHS): 5 CMH2O
PH BLDA: 7.44 [PH] (ref 7.35–7.45)
PHOSPHATE SERPL-MCNC: 2.2 MG/DL (ref 2.3–4.7)
PLATELET # BLD AUTO: 167 X10(3)/MCL (ref 130–400)
PMV BLD AUTO: 10 FL (ref 7.4–10.4)
PO2 BLDA: 67 MMHG (ref 80–100)
POTASSIUM BLOOD GAS (OHS): 3.4 MMOL/L (ref 3.5–5)
POTASSIUM SERPL-SCNC: 3.2 MMOL/L (ref 3.5–5.1)
PROT SERPL-MCNC: 6.1 GM/DL (ref 5.8–7.6)
PS (OHS): 10 CMH2O
RBC # BLD AUTO: 4.72 X10(6)/MCL (ref 4.2–5.4)
SAMPLE SITE BLOOD GAS (OHS): ABNORMAL
SAO2 % BLDA: 94 %
SODIUM BLOOD GAS (OHS): 139 MMOL/L (ref 137–145)
SODIUM SERPL-SCNC: 134 MMOL/L (ref 136–145)
SODIUM SERPL-SCNC: 134 MMOL/L (ref 136–145)
SODIUM SERPL-SCNC: 135 MMOL/L (ref 136–145)
SODIUM SERPL-SCNC: 137 MMOL/L (ref 136–145)
SODIUM SERPL-SCNC: 137 MMOL/L (ref 136–145)
SODIUM SERPL-SCNC: 139 MMOL/L (ref 136–145)
SODIUM SERPL-SCNC: 142 MMOL/L (ref 136–145)
SPONT RR (OHS): 15 B/MIN
TSH SERPL-ACNC: 3.45 UIU/ML (ref 0.35–4.94)
WBC # SPEC AUTO: 11.41 X10(3)/MCL (ref 4.5–11.5)

## 2023-09-01 PROCEDURE — 27100171 HC OXYGEN HIGH FLOW UP TO 24 HOURS

## 2023-09-01 PROCEDURE — 25000003 PHARM REV CODE 250

## 2023-09-01 PROCEDURE — 63600175 PHARM REV CODE 636 W HCPCS: Performed by: PHYSICIAN ASSISTANT

## 2023-09-01 PROCEDURE — 63600175 PHARM REV CODE 636 W HCPCS

## 2023-09-01 PROCEDURE — 25500020 PHARM REV CODE 255: Performed by: PSYCHIATRY & NEUROLOGY

## 2023-09-01 PROCEDURE — 63600175 PHARM REV CODE 636 W HCPCS: Performed by: PSYCHIATRY & NEUROLOGY

## 2023-09-01 PROCEDURE — 82803 BLOOD GASES ANY COMBINATION: CPT

## 2023-09-01 PROCEDURE — 63600175 PHARM REV CODE 636 W HCPCS: Performed by: INTERNAL MEDICINE

## 2023-09-01 PROCEDURE — 83036 HEMOGLOBIN GLYCOSYLATED A1C: CPT | Performed by: NURSE PRACTITIONER

## 2023-09-01 PROCEDURE — 99232 PR SUBSEQUENT HOSPITAL CARE,LEVL II: ICD-10-PCS | Mod: ,,, | Performed by: NEUROLOGICAL SURGERY

## 2023-09-01 PROCEDURE — 94003 VENT MGMT INPAT SUBQ DAY: CPT

## 2023-09-01 PROCEDURE — 84443 ASSAY THYROID STIM HORMONE: CPT | Performed by: NURSE PRACTITIONER

## 2023-09-01 PROCEDURE — D9220A PRA ANESTHESIA: Mod: ANES,,, | Performed by: ANESTHESIOLOGY

## 2023-09-01 PROCEDURE — 63600175 PHARM REV CODE 636 W HCPCS: Mod: JZ,JG | Performed by: EMERGENCY MEDICINE

## 2023-09-01 PROCEDURE — 94761 N-INVAS EAR/PLS OXIMETRY MLT: CPT

## 2023-09-01 PROCEDURE — 25000003 PHARM REV CODE 250: Performed by: NURSE ANESTHETIST, CERTIFIED REGISTERED

## 2023-09-01 PROCEDURE — 63600175 PHARM REV CODE 636 W HCPCS: Performed by: NURSE ANESTHETIST, CERTIFIED REGISTERED

## 2023-09-01 PROCEDURE — 84100 ASSAY OF PHOSPHORUS: CPT

## 2023-09-01 PROCEDURE — A4216 STERILE WATER/SALINE, 10 ML: HCPCS

## 2023-09-01 PROCEDURE — 84295 ASSAY OF SERUM SODIUM: CPT | Performed by: STUDENT IN AN ORGANIZED HEALTH CARE EDUCATION/TRAINING PROGRAM

## 2023-09-01 PROCEDURE — D9220A PRA ANESTHESIA: ICD-10-PCS | Mod: ANES,,, | Performed by: ANESTHESIOLOGY

## 2023-09-01 PROCEDURE — 80053 COMPREHEN METABOLIC PANEL: CPT

## 2023-09-01 PROCEDURE — 20000000 HC ICU ROOM

## 2023-09-01 PROCEDURE — 99900026 HC AIRWAY MAINTENANCE (STAT)

## 2023-09-01 PROCEDURE — D9220A PRA ANESTHESIA: ICD-10-PCS | Mod: CRNA,,, | Performed by: NURSE ANESTHETIST, CERTIFIED REGISTERED

## 2023-09-01 PROCEDURE — 27200966 HC CLOSED SUCTION SYSTEM

## 2023-09-01 PROCEDURE — C9248 INJ, CLEVIDIPINE BUTYRATE: HCPCS | Mod: JZ,JG | Performed by: EMERGENCY MEDICINE

## 2023-09-01 PROCEDURE — 99232 SBSQ HOSP IP/OBS MODERATE 35: CPT | Mod: ,,, | Performed by: NEUROLOGICAL SURGERY

## 2023-09-01 PROCEDURE — 99900035 HC TECH TIME PER 15 MIN (STAT)

## 2023-09-01 PROCEDURE — D9220A PRA ANESTHESIA: Mod: CRNA,,, | Performed by: NURSE ANESTHETIST, CERTIFIED REGISTERED

## 2023-09-01 PROCEDURE — 85025 COMPLETE CBC W/AUTO DIFF WBC: CPT

## 2023-09-01 PROCEDURE — 37799 UNLISTED PX VASCULAR SURGERY: CPT

## 2023-09-01 PROCEDURE — 83735 ASSAY OF MAGNESIUM: CPT

## 2023-09-01 PROCEDURE — 25000003 PHARM REV CODE 250: Performed by: PSYCHIATRY & NEUROLOGY

## 2023-09-01 RX ORDER — VERAPAMIL HYDROCHLORIDE 2.5 MG/ML
INJECTION, SOLUTION INTRAVENOUS
Status: COMPLETED | OUTPATIENT
Start: 2023-09-01 | End: 2023-09-01

## 2023-09-01 RX ORDER — VASOPRESSIN 20 [USP'U]/ML
INJECTION, SOLUTION INTRAMUSCULAR; SUBCUTANEOUS
Status: DISCONTINUED | OUTPATIENT
Start: 2023-09-01 | End: 2023-09-01

## 2023-09-01 RX ORDER — POTASSIUM CHLORIDE 14.9 MG/ML
40 INJECTION INTRAVENOUS ONCE
Status: COMPLETED | OUTPATIENT
Start: 2023-09-01 | End: 2023-09-01

## 2023-09-01 RX ORDER — NITROGLYCERIN 5 MG/ML
INJECTION, SOLUTION INTRAVENOUS
Status: COMPLETED | OUTPATIENT
Start: 2023-09-01 | End: 2023-09-01

## 2023-09-01 RX ORDER — GLYCOPYRROLATE 0.2 MG/ML
INJECTION INTRAMUSCULAR; INTRAVENOUS
Status: DISCONTINUED | OUTPATIENT
Start: 2023-09-01 | End: 2023-09-01

## 2023-09-01 RX ORDER — PHENYLEPHRINE HCL IN 0.9% NACL 1 MG/10 ML
SYRINGE (ML) INTRAVENOUS
Status: DISCONTINUED | OUTPATIENT
Start: 2023-09-01 | End: 2023-09-01

## 2023-09-01 RX ORDER — PROPOFOL 10 MG/ML
VIAL (ML) INTRAVENOUS
Status: DISCONTINUED | OUTPATIENT
Start: 2023-09-01 | End: 2023-09-01

## 2023-09-01 RX ORDER — ROCURONIUM BROMIDE 10 MG/ML
INJECTION, SOLUTION INTRAVENOUS
Status: DISCONTINUED | OUTPATIENT
Start: 2023-09-01 | End: 2023-09-01

## 2023-09-01 RX ORDER — HEPARIN SOD,PORCINE/0.9 % NACL 1000/500ML
INTRAVENOUS SOLUTION INTRAVENOUS
Status: COMPLETED | OUTPATIENT
Start: 2023-09-01 | End: 2023-09-01

## 2023-09-01 RX ADMIN — GLYCOPYRROLATE 0.2 MG: 0.2 INJECTION INTRAMUSCULAR; INTRAVENOUS at 01:09

## 2023-09-01 RX ADMIN — SODIUM CHLORIDE, PRESERVATIVE FREE 10 ML: 5 INJECTION INTRAVENOUS at 06:09

## 2023-09-01 RX ADMIN — MUPIROCIN: 20 OINTMENT TOPICAL at 09:09

## 2023-09-01 RX ADMIN — IOPAMIDOL 100 ML: 755 INJECTION, SOLUTION INTRAVENOUS at 03:09

## 2023-09-01 RX ADMIN — CLEVIPIDINE 4 MG/HR: 0.5 EMULSION INTRAVENOUS at 04:09

## 2023-09-01 RX ADMIN — VERAPAMIL HYDROCHLORIDE 2.5 MG: 2.5 INJECTION, SOLUTION INTRAVENOUS at 01:09

## 2023-09-01 RX ADMIN — PROPOFOL 40 MG: 10 INJECTION, EMULSION INTRAVENOUS at 01:09

## 2023-09-01 RX ADMIN — ROCURONIUM BROMIDE 50 MG: 10 SOLUTION INTRAVENOUS at 12:09

## 2023-09-01 RX ADMIN — VASOPRESSIN 4 UNITS: 20 INJECTION INTRAVENOUS at 01:09

## 2023-09-01 RX ADMIN — PROPOFOL 40 MCG/KG/MIN: 10 INJECTION, EMULSION INTRAVENOUS at 09:09

## 2023-09-01 RX ADMIN — NITROGLYCERIN 200 MCG: 5 INJECTION, SOLUTION INTRAVENOUS at 01:09

## 2023-09-01 RX ADMIN — LEVETIRACETAM 500 MG: 100 INJECTION, SOLUTION INTRAVENOUS at 09:09

## 2023-09-01 RX ADMIN — PHENYLEPHRINE HYDROCHLORIDE 0.5 MCG/KG/MIN: 10 INJECTION INTRAVENOUS at 01:09

## 2023-09-01 RX ADMIN — SODIUM CHLORIDE, PRESERVATIVE FREE 10 ML: 5 INJECTION INTRAVENOUS at 11:09

## 2023-09-01 RX ADMIN — Medication 25 MCG/HR: at 12:09

## 2023-09-01 RX ADMIN — SODIUM CHLORIDE 50 ML/HR: 3 INJECTION, SOLUTION INTRAVENOUS at 06:09

## 2023-09-01 RX ADMIN — Medication 300 MCG: at 01:09

## 2023-09-01 RX ADMIN — PROPOFOL 50 MCG/KG/MIN: 10 INJECTION, EMULSION INTRAVENOUS at 09:09

## 2023-09-01 RX ADMIN — SODIUM CHLORIDE, PRESERVATIVE FREE 10 ML: 5 INJECTION INTRAVENOUS at 12:09

## 2023-09-01 RX ADMIN — Medication 3000 ML: at 01:09

## 2023-09-01 RX ADMIN — POTASSIUM CHLORIDE 40 MEQ: 14.9 INJECTION, SOLUTION INTRAVENOUS at 05:09

## 2023-09-01 RX ADMIN — SODIUM CHLORIDE, SODIUM GLUCONATE, SODIUM ACETATE, POTASSIUM CHLORIDE AND MAGNESIUM CHLORIDE: 526; 502; 368; 37; 30 INJECTION, SOLUTION INTRAVENOUS at 12:09

## 2023-09-01 RX ADMIN — CLEVIPIDINE 4 MG/HR: 0.5 EMULSION INTRAVENOUS at 01:09

## 2023-09-01 RX ADMIN — PROPOFOL 40 MCG/KG/MIN: 10 INJECTION, EMULSION INTRAVENOUS at 02:09

## 2023-09-01 NOTE — NURSING
Nurses Note -- 4 Eyes      9/1/2023   3:50 PM      Skin assessed during: Daily Assessment      [x] No Altered Skin Integrity Present    [x]Prevention Measures Documented      [] Yes- Altered Skin Integrity Present or Discovered   [] LDA Added if Not in Epic (Describe Wound)   [] New Altered Skin Integrity was Present on Admit and Documented in LDA   [] Wound Image Taken    Wound Care Consulted? No    Attending Nurse:  Lester Moreland RN/Staff Member:  KALYAN Cheng

## 2023-09-01 NOTE — PROGRESS NOTES
Inpatient Nutrition Assessment    Admit Date: 8/30/2023   Total duration of encounter: 2 days     Nutrition Recommendation/Prescription     Start tube feeding when appropriate.  Tube feeding recommendation:   Peptamen Intense VHP goal rate 40 ml/hr to provide  800 kcal/d (69% est needs, 105% with meds)  74 g protein/d (100% est needs)  672 ml free water/d  (calculations based on estimated 20 hr/d run time)     Communication of Recommendations: reviewed with nurse    Nutrition Assessment     Malnutrition Assessment/Nutrition-Focused Physical Exam    Malnutrition Context: chronic illness  Malnutrition Level: severe  Energy Intake (Malnutrition):  (unable to eval)  Weight Loss (Malnutrition):  (unable to eval)  Subcutaneous Fat (Malnutrition): severe depletion  Orbital Region (Subcutaneous Fat Loss): severe depletion  Upper Arm Region (Subcutaneous Fat Loss): severe depletion     Muscle Mass (Malnutrition): severe depletion  Mandaen Region (Muscle Loss): severe depletion  Clavicle Bone Region (Muscle Loss): severe depletion  Clavicle and Acromion Bone Region (Muscle Loss): severe depletion                 Fluid Accumulation (Malnutrition):  (does not meet criteria)        A minimum of two characteristics is recommended for diagnosis of either severe or non-severe malnutrition.    Chart Review    Reason Seen: continuous nutrition monitoring and physician consult for assess dietary needs    Diagnosis:  Left-sided hemorrhagic CVA     Relevant Medical History: no known hx    Nutrition-Related Medications: cleviprex, diprivan, 3% @ 50ml/hr  Calorie Containing IV Medications: Diprivan @ 12 ml/hr (provides 315 kcal/d) and Cleviprex @ 2 ml/hr (provides 95 kcal/d)    Nutrition-Related Labs:  9/1 K 3.2, Glu 121, Phos 2.2    Diet/PN Order: Diet NPO  Oral Supplement Order: none  Tube Feeding Order: none  Appetite/Oral Intake: not applicable/not applicable  Factors Affecting Nutritional Intake: on mechanical  "ventilation  Food/Scientologist/Cultural Preferences: unable to obtain  Food Allergies: none reported       Wound(s):   incision noted    Comments    23: Discussed with RN. Will provide tube feeding recommendations for when appropriate to start tube feeding. Receiving kcal from meds. Possible plans for extubation post procedure today. Will likely place NG prior to extubation per RN.     Anthropometrics    Height: 5' 5.98" (167.6 cm)    Last Weight: 50 kg (110 lb 3.7 oz) (23)    BMI (Calculated): 17.8  BMI Classification: underweight (BMI less than 18.5)     Ideal Body Weight (IBW), Female: 129.9 lb     % Ideal Body Weight, Female (lb): 84.79 %                             Usual Weight Provided By: unable to obtain usual weight    Wt Readings from Last 5 Encounters:   23 50 kg (110 lb 3.7 oz)     Weight Change(s) Since Admission:  Admit Weight: 50 kg (110 lb 3.7 oz) (23)    Estimated Needs    Weight Used For Calorie Calculations: 50 kg (110 lb 3.7 oz)  Energy Calorie Requirements (kcal): 1154kcal  Energy Need Method: [place]  Weight Used For Protein Calculations: 50 kg (110 lb 3.7 oz)  Protein Requirements: 75gm (1.5g/kg)  Fluid Requirements (mL): 1154ml (1ml/kcal)  Temp (24hrs), Av.9 °F (36.6 °C), Min:97 °F (36.1 °C), Max:98.4 °F (36.9 °C)    Vtot (L/Min) for [place] Equation Calculation: 7.1    Enteral Nutrition    Patient not receiving enteral nutrition at this time.    Parenteral Nutrition    Patient not receiving parenteral nutrition support at this time.    Evaluation of Received Nutrient Intake    Calories: not meeting estimated needs  Protein: not meeting estimated needs    Patient Education    Not applicable.    Nutrition Diagnosis     PES: Malnutrition related to chronic illness as evidenced by muscle wasting, fat loss. (new)    Interventions/Goals     Intervention(s): modified composition of enteral nutrition, modified rate of enteral nutrition, and collaboration with " other providers  Goal: Meet greater than 75% of nutritional needs by follow-up. (new)    Monitoring & Evaluation     Dietitian will monitor energy intake.  Nutrition Risk/Follow-Up: high (follow-up in 1-4 days)   Please consult if re-assessment needed sooner.

## 2023-09-01 NOTE — PROGRESS NOTES
[facility] [address]  Neurosurgery  Progress Note    Subjective:     Interval History: Patient underwent angiogram yesterday with Dr. 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 There is also an Left MMA AV fistula draining into the SSS. She is currently intubated and sedated, does not rouse to stimuli, but moves extremities to painful inputs.  Plan for cath lab today for possible AV fistula embolization, wean sedation and assess suitability for extubation following the procedure.  Currently on hypertonic saline at 50cc/hr, Na 134 this am    History of Present Illness: Patient is a 73 year old female with unknown past medical history, who presented to the ED on 8/30 as a level 2 trauma activation for fall. EMS reported that she fell off of the side of the bed and was caught by her , no LOC, denies hitting head. EMS reported last known normal 2100, on scene slurred speech and right sided facial droop. CT head showed an intraparenchymal hematoma in the left temporal lobe with intraventricular extension and subarachnoid hemorrhage of the left temporal lobe and in the left sylvian fissure. CTA head and neck showed possible small vascular malformation along the inferior margin of the left cerebral hematoma; no large vessel occlusion or flow-limiting stenosis. BP upon arrival was 106/88. Dr. Birch was consulted for evaluation and treatment recommendations.    Post-Op Info:  * No surgery found *          Medications:  Continuous Infusions:   clevidipine 1 mg/hr (09/01/23 0600)    fentanyl 25 mcg/hr (09/01/23 0000)    propofoL 40 mcg/kg/min (09/01/23 0958)    sodium chloride 3% HYPERTONIC 50 mL/hr (08/31/23 1557)     Scheduled Meds:   levETIRAcetam (Keppra) IV (PEDS and ADULTS)  500 mg Intravenous Q12H    mupirocin   Nasal BID    sodium chloride 0.9%  10 mL Intravenous Q6H     PRN Meds:fentanyl, labetalol, Flushing PICC  Protocol **AND** sodium chloride 0.9% **AND** sodium chloride 0.9%     Review of Systems  Objective:     Weight: 50 kg (110 lb 3.7 oz)  Body mass index is 17.79 kg/m².  Vital Signs (Most Recent):  Temp: 98.3 °F (36.8 °C) (09/01/23 0800)  Pulse: (!) 111 (09/01/23 0800)  Resp: (!) 22 (09/01/23 0800)  BP: 135/76 (09/01/23 0800)  SpO2: 98 % (09/01/23 0800) Vital Signs (24h Range):  Temp:  [97 °F (36.1 °C)-98.4 °F (36.9 °C)] 98.3 °F (36.8 °C)  Pulse:  [] 111  Resp:  [15-29] 22  SpO2:  [96 %-100 %] 98 %  BP: ()/(56-83) 135/76  Arterial Line BP: (107-155)/(49-83) 155/77     Date 09/01/23 0700 - 09/02/23 0659   Shift 9110-7308 0609-7119 9449-2977 24 Hour Total   INTAKE   Shift Total(mL/kg)       OUTPUT   Urine(mL/kg/hr) 250   250   Shift Total(mL/kg) 250(5)   250(5)   Weight (kg) 50 50 50 50              Vent Mode: A/C  Oxygen Concentration (%):  [30] 30  Resp Rate Total:  [20 br/min] 20 br/min  Vt Set:  [450 mL] 450 mL  PEEP/CPAP:  [5 cmH20] 5 cmH20  Mean Airway Pressure:  [8 cmH20-9 cmH20] 9 cmH20             Urethral Catheter 08/31/23 0130 Temperature probe 16 Fr. (Active)   Site Assessment Clean;Intact;Dry 09/01/23 0800   Collection Container Urimeter 09/01/23 0800   Securement Method secured to top of thigh w/ adhesive device 09/01/23 0800   Catheter Care Performed yes 09/01/23 0700   Reason for Continuing Urinary Catheterization Critically ill in ICU and requiring hourly monitoring of intake/output 09/01/23 0800   CAUTI Prevention Bundle Intact seal between catheter & drainage tubing 09/01/23 0800   Output (mL) 75 mL 09/01/23 1000       Neurosurgery Physical Exam  Patient intubated, sedated on propofol and cleviprex  Pupils 2mm, sluggish  Does not open eyes to noxious stimuli. She withdraws in all ext.  E: 1, V:1T, M: 4, GCS: 6T    Significant Labs:  Recent Labs   Lab 08/30/23  2318 08/31/23  0436 09/01/23  0007 09/01/23  0219 09/01/23  0407 09/01/23  0808   * 133*   < > 135* 134* 137   K 4.0 3.8  --   3.2*  --   --    CO2 24 24  --  20*  --   --    BUN 7.6* 6.6*  --  4.0*  --   --    CREATININE 0.67 0.63  --  0.47*  --   --    CALCIUM 9.3 8.9  --  8.2*  --   --    MG  --  1.70  --  1.60  --   --     < > = values in this interval not displayed.     Recent Labs   Lab 08/30/23 2318 08/30/23 2318 08/31/23  0436 09/01/23  0219   WBC 9.40  --  12.13* 11.41   HGB 15.0  --  14.7 15.1   HCT 43.2 45 42.6 43.8     --  181 167     Recent Labs   Lab 08/30/23 2316 08/30/23 2318   INR 1.1 1.1     Microbiology Results (last 7 days)       ** No results found for the last 168 hours. **            Significant Diagnostics:      Assessment/Plan:     Active Diagnoses:    Diagnosis Date Noted POA    Intracerebral hemorrhage [I61.9] 08/31/2023 Yes      Problems Resolved During this Admission:     She is intubated and sedated; exam limited but currently GCS 6T  Plan for cath lab today for possible AV fistula embolization  Continue to wean sedation as tolerated following procedure for Q1 hour neuro exams  Continue BP parameters below 140/90  Continue hypertonic saline at 50cc/hr, Na goal of 145  Keppra BID  SCDs for DVT prophylaxis  Please call with any neuro decline    PRINCE Reyes  Neurosurgery  [facility] - [facility]

## 2023-09-01 NOTE — SEDATION DOCUMENTATION
Pt returned to bed. Right radial access site free of bleeding or hematoma, TR band/syringe in place.

## 2023-09-01 NOTE — TRANSFER OF CARE
"Anesthesia Transfer of Care Note    Patient: Peg Nikita    Procedure(s) Performed: * No procedures listed *    Patient location: Cath Lab    Anesthesia Type: general    Transport from OR: Transported from OR intubated on 100% O2 by AMBU with adequate controlled ventilation    Post pain: adequate analgesia    Post assessment: no apparent anesthetic complications and tolerated procedure well    Post vital signs: stable    Level of consciousness: sedated    Nausea/Vomiting: no nausea/vomiting    Complications: none    Transfer of care protocol was followed      Last vitals:   Visit Vitals  /74 (BP Location: Left arm, Patient Position: Lying)   Pulse 75   Temp 36.7 °C (98 °F) (Oral)   Resp 20   Ht 5' 5.98" (1.676 m)   Wt 50 kg (110 lb 3.7 oz)   SpO2 100%   BMI 17.80 kg/m²     "

## 2023-09-01 NOTE — SUBJECTIVE & OBJECTIVE
Subjective:     Interval History:   No new issues, remained intubated overnight for angio today for fistula repair.     Current Neurological Medications:     Current Facility-Administered Medications   Medication Dose Route Frequency Provider Last Rate Last Admin    clevidipine (CLEVIPREX) 25 mg/50 mL infusion  0-16 mg/hr Intravenous Continuous Gita Ballard MD 2 mL/hr at 09/01/23 0600 1 mg/hr at 09/01/23 0600    fentaNYL 2500 mcg in 0.9% sodium chloride 250 mL infusion premix (titrating)  0-250 mcg/hr Intravenous Continuous PRN Jocelyn Lopez MD 2.5 mL/hr at 09/01/23 0000 25 mcg/hr at 09/01/23 0000    labetaloL injection 10 mg  10 mg Intravenous Q4H PRN Ramiro De Santiago DO        levETIRAcetam (Keppra) 500 mg in dextrose 5 % in water (D5W) 100 mL IVPB (MB+)  500 mg Intravenous Q12H Ramiro De Santiago DO   Stopped at 09/01/23 1023    mupirocin 2 % ointment   Nasal BID Roberto Biswas Jr., MD, FCCP   Given at 09/01/23 0953    propofol (DIPRIVAN) 10 mg/mL infusion  0-50 mcg/kg/min Intravenous Continuous Roberto Biswas Jr., MD, FCCP 12 mL/hr at 09/01/23 0958 40 mcg/kg/min at 09/01/23 0958    sodium chloride 0.9% flush 10 mL  10 mL Intravenous Q6H Jocelyn Lopez MD   10 mL at 09/01/23 1138    And    sodium chloride 0.9% flush 10 mL  10 mL Intravenous PRN Jocelyn Lopez MD        sodium chloride 3% HYPERTONIC solution  50 mL/hr Intravenous Continuous Ally Mccoy PA 50 mL/hr at 08/31/23 2138 50 mL/hr at 08/31/23 2138       Review of Systems   Unable to perform ROS: Intubated     Objective:     Vital Signs (Most Recent):  Temp: 98.3 °F (36.8 °C) (09/01/23 0800)  Pulse: 74 (09/01/23 1100)  Resp: 20 (09/01/23 1100)  BP: 108/69 (09/01/23 1100)  SpO2: 99 % (09/01/23 1100) Vital Signs (24h Range):  Temp:  [97 °F (36.1 °C)-98.4 °F (36.9 °C)] 98.3 °F (36.8 °C)  Pulse:  [] 74  Resp:  [15-29] 20  SpO2:  [96 %-100 %] 99 %  BP: ()/(54-83) 108/69  Arterial Line BP: (107-155)/(49-83) 119/57     Weight: 50 kg (110  lb 3.7 oz)  Body mass index is 17.8 kg/m².     Physical Exam  Vitals and nursing note reviewed.   Constitutional:       General: She is not in acute distress.     Appearance: She is not toxic-appearing.      Interventions: She is sedated and intubated.   Eyes:      Pupils:      Right eye: Pupil is not round. Pupil is reactive.      Left eye: Pupil is round and reactive.   Pulmonary:      Effort: She is intubated.   Musculoskeletal:         General: Normal range of motion.      Cervical back: Normal range of motion.      Right lower leg: No edema.      Left lower leg: No edema.   Skin:     General: Skin is warm and dry.      Capillary Refill: Capillary refill takes less than 2 seconds.   Neurological:      Comments:     Limited PE 2/2 intubation and sedation  Withdraws from painful stim BUE, BLE  Right pupil irregular shape, both pupils appear to be reactive  Gaze midline                Significant Labs:   Recent Lab Results  (Last 5 results in the past 24 hours)        09/01/23  0808   09/01/23  0407   09/01/23  0219   09/01/23  0007   08/31/23  1510        Albumin/Globulin Ratio     1.4           Albumin     3.6           Alkaline Phosphatase     64           Allens Test         N/A       ALT     17           AST     33           Baso #     0.02           Basophil %     0.2           BILIRUBIN TOTAL     0.9           BUN     4.0           Calcium     8.2           Ionized Calcium         1.09       Chloride     106           CO2     20           Creatinine     0.47           Drawn by         sft rrt       eGFR     >60           Eos #     0.01           Eosinophil %     0.1           Estimated Avg Glucose     108.3           FIO2, Blood gas         30       Globulin, Total     2.5           Glucose     121           Hematocrit     43.8           Hemoglobin     15.1           Hemoglobin A1C External     5.4           Immature Grans (Abs)     0.03           Immature Granulocytes     0.3           Lymph #     0.67            LYMPH %     5.9           Magnesium      1.60           MCH     32.0           MCHC     34.5           MCV     92.8           Mech Vt         450       MODE         AC       Mono #     0.56           Mono %     4.9           MPV     10.0           Neut #     10.12           Neut %     88.6           nRBC     0.0           Oxygen Device, Blood gas         Ventilator       PEEP         5.0       Phosphorus     2.2           Platelets     167           Base Excess, Blood gas         1.60       POC HCO3         25.9       POC PCO2         39.0       POC PH         7.430       POC PO2         84.0       Potassium, Blood Gas         2.9       Sodium, Blood Gas         129       Potassium     3.2           PROTEIN TOTAL     6.1           PS         10.0       RBC     4.72           RDW     13.2           Mech RR         20       Sample site         Arterial Line       Sample Type         Arterial Blood       sO2, Blood gas         96.6       Sodium 137   134   135   134         TOC2, Blood gas         14.9       Thyroid Stimulating Hormone     3.451           WBC     11.41                                  Significant Imaging: I have reviewed all pertinent imaging results/findings within the past 24 hours.

## 2023-09-01 NOTE — PROGRESS NOTES
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 There is also an Left MMA AV fistula draining into the SSS.     I reviewed the imaging with the patient's  and the son and discussed the plan of care. I discussed the natural course of AVMs, AVFs and explained the risks of AVM rupture 2-3% per year. Patient has a moderate sized hematoma and is under surveillance at this time with no indication for acute intervention per Neurosurgery. Repeat CTH is stable without worsening of hematoma.     I discussed that given the underlying AVM and the current hematoma, we will plan for staged embolization of the AVM nidus multiple weeks apart followed by AVM resection after the last embolization. I discussed performing embolization of the left MMA AV fistula as part of the staged embolization treatment tomorrow.    I answered all the questions from family and addressed their concerns.     Yaakov South MD  Vascular and Interventional Neurology

## 2023-09-01 NOTE — PT/OT/SLP PROGRESS
Routine CVA orders received, chart reviewed, and nursing consulted.  Pt currently intubated and requiring mechanical ventilation for respiratory support.  Skilled SLP services are not warranted at this time.  Please reconsult as needed.

## 2023-09-01 NOTE — OP NOTE
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     COMPLICATIONS:  None.    APPROACH:  Right femoral artery      VESSELS SELECTIVELY CATHETERIZED:   Left vertebral artery   Left common carotid artery  Left internal carotid artery   Left external carotid artery   Right common carotid artery  Right internal carotid artery  Right external carotid artery  Right subclavian artery   Right vertebral artery    DEVICES USED:  5 Fr short sheath  5 Fr Angled catheter, Sim 2  035 glidewire  Micropuncture kit       INDICATION:  73 y.o. years old female with left temporal hemorrahge with intraventricular extension presents for a cerebral angiogram for evaluation of due to concern for underlying vascular malformation.    DETAILED DESCRIPTION:      Risks/benefits were thoroughly reviewed with family prior to the procedure.  Risks inclusive but not limited to death, stroke, contrast reaction/nephropathy, access/vascular injury, and other unforeseen risks.   family provided informed consent.  Patient supine on the angio table, groin prepped and draped in routine  fashion.  GA was induced.     Under the ultrasound guidance, the right femoral artery was accessed using a micropuncture kit and an arterial sheath was placed using the modified Seldinger technique.  Diagnostic catheter telescoping over the 035 glidewire advanced to the arch and double flushed.  Enumerated vessels were then selectively catheterized and angiograms obtained as listed below.  Multiple cervical and intracranial runs were obtained in AP and lateral projection.  The films were reviewed and determined to be of good diagnostic quality. Diagnostic catheter and sheath were then withdrawn and hemostasis was obtained with manual compression.  No immediate complications were noted.  Patient tolerated the procedure well.    Angiograms performed and findings:    Right femoral artery:  Sheath placement in the right common femoral artery below the femoral bifurcation.  There is no evidence of stenosis, dissection, atherosclerosis or contrast extravasation at the site of puncture. There is evidence of calcific atherosclerosis in right iliac artery and distal aorta.   Left vertebral artery - cerebral:  Left vertebral artery is codominant.  Unremarkable distal cervical and intracranial segments of the left vertebral artery.The left PICA territory is supplied by AICA. bilateral AICA, SCA, PCR visualized.  The basilar artery appears patent.  There is normal capillary and venous phase. NO evidence of AVM, AVF, aneurysm noted.   Right subclavian artery - cervical:  Normal origin and cervical course of the right vertebral artery.    Left common carotid artery - cervical:  Unremarkable left common carotid and carotid bifurcation.  The left internal carotid artery is widely patent distally the left external carotid artery and its branches appear unremarkable.    Left external carotid artery - cerebral: There is an left MMA AV fistula noted draining into the SSS.   Left internal carotid artery - cerebral:  The distal cervical,  petrous, cavernous, supraclinoid segments of the left ICA appeared patent. There is normal flow and branching noted in the left JOSHUA and MCA territory. There is an AVM nidus noted in the left temporal region supplied by the left MCA pedicles and draining into the left sylvian vein. The nidus measures 8.7 x 6.7mm on lateral view.   Right common carotid artery - cervical:  Unremarkable right common carotid artery and carotid bifurcation.  The internal carotid artery is widely patent distally.  The right external carotid artery and its branches appear unremarkable.    Right internal carotid artery - cerebral:  The distal cervical, petrous, cavernous, supraclinoid segments of the right ICA appear patent.  There is a posterior communicating artery supplying the PCA territory.  There is normal flow and branching noted in right JOSHUA and MCA territory.  The capillary and venous phases appear unremarkable.  Right external carotid artery - cerebral:  The right external carotid artery and its branches appear unremarkable.  There is no evidence of AV fistula.  Right vertebral artery - cerebral:  Right vertebral artery is co-dominant.  Unremarkable distal cervical and intracranial segments of the right vertebral artery.  bilateral AICA, SCA, PCA are visualized.  The basilar artery appears patent.  There is normal capillary and venous phase.  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         ______________________________  Yaakov South MD  Vascular and Interventional Neurology

## 2023-09-01 NOTE — OP NOTE
[facility]                       [address] [id number]     PATIENT NAME:Bozena Shelton    YOB: 1950      DATE OF SURGERY: 09/01/2023      SURGEON:  Yaakov South MD     PREOPERATIVE DIAGNOSIS:  left temoral ICH, left MMA AV fistula, left temporal AVM     POSTOPERATIVE DIAGNOSIS:  as above     PROCEDURE PERFORMED:    Cerebral angiogram with catheter insertion in the following arteries: left common carotid artery, left external carotid artery, left middle meningeal artery  Interpretation of images  Ultrasound-guided right radial artery access     LEVEL OF SEDATION:  GA     TOTAL INTRASERVICE SEDATION TIME:      COMPLICATIONS:  None.    APPROACH:  Right radial artery      VESSELS SELECTIVELY CATHETERIZED:   Left common carotid artery  Left external carotid artery    Left middle meningeal artery    DEVICES USED:  6 Fr slender sheath  5 Fr Sim select catheter  6 Fr RIST guide catheter  5 Fr Jaja 125 cm  Apollo microcatheter  Chikai 008 microwire  035 glidewire  TR band     INDICATION:  73 y.o. years old female with a left temporal ICH underwent DSA which demonstrated a left temporal AVM, left MMA AV fistula. She presents for a cerebral angiogram for embolization of the left MMA AV fistula.    DETAILED DESCRIPTION:      Risks/benefits were thoroughly reviewed with family prior to the procedure.  Risks inclusive but not limited to death, stroke, contrast reaction/nephropathy, access/vascular injury, and other unforeseen risks.  family provided informed consent.  Patient supine on the angio table, wrist prepped and draped in routine fashion.  GA was induced.    Under the ultrasound guidance, the right radial artery was accessed an arterial sheath was placed.  Radial cocktail of 2.5mg verapamil, 200 mcg nitroglycerin were given through the sheath. The guide catheter telescoping over the diagnostic catheter and 035 glidewire  were advanced to the arch and double flushed. The left ECA was selectively catheterized under fluoroscopy and roadmap guidance. Jaja intermediate catheter was introduced in the guide catheter.     The left middle meningeal artery was identified. Using Apollo microcatheter telescoping over the cChikai microwire, the left MMA was catheterized. Attempts were made to advance the microcatheter distally in the left MMA however this was not possible due to the tortuosity in the MMA.     At this point, decision was made to abort the procedure as the proximal embolization carries higher risk with vision loss.    catheter and sheath were then withdrawn and hemostasis was obtained with above closure device.  No immediate complications were noted.  Patient tolerated the procedure well.    Angiograms performed and findings:    Right radial artery:  Sheath placement in the right radial artery.  There is no evidence of stenosis, dissection, atherosclerosis or contrast extravasation at the site of puncture.  Left common carotid artery - cervical:  Unremarkable left common carotid and carotid bifurcation.  The left internal carotid artery is widely patent distally the left external carotid artery and its branches appear unremarkable.    Left external carotid artery - cerebral:  The left external carotid artery and its branches appear patent.  The left middle meningeal artery is identified.  There is no evidence of AV fistula.   Left middle meningeal artery cerebral:  There is tortuosity in the proximal segment of the left middle meningeal artery.  Multiple unsuccessful attempts were made to advance microcatheter/microwire into the left middle meningeal artery.    OVERALL IMPRESSION:  Unsuccessful embolization of the left middle meningeal artery arteriovenous fistula due to tortuosity.        ______________________________  Yaakov South MD  Vascular and Interventional Neurology

## 2023-09-01 NOTE — PROGRESS NOTES
[facility] - [facility]  Neurology  Progress Note    Patient Name: Bozena Shelton  MRN: [id number]  Admission Date: 8/30/2023  Hospital Length of Stay: 2 days  Code Status: Full Code   Attending Provider: Roberto Biswas Jr., MD,*  Primary Care Physician: No, Primary Doctor   Principal Problem:<principal problem not specified>    HPI:   73 year old female with unknown past medical history presented to ED on 8/30 as a level 2 trauma activation for fall. EMS reported that she fell off of the side of the bed and was caught by her , no LOC, denies hitting head. EMS reported last known normal 2100, on scene slurred speech and right sided facial droop. CT head showed an intraparenchymal hematoma in the left temporal lobe with intraventricular extension and subarachnoid hemorrhage of the left temporal lobe and in the left sylvian fissure. CTA head and neck was negative for aneurysm. BP upon arrival 106/88. Neurology was consulted for hemorrhagic stroke.           Overview/Hospital Course:  No notes on file        Subjective:     Interval History:   No new issues, remained intubated overnight for angio today for fistula repair.     Current Neurological Medications:     Current Facility-Administered Medications   Medication Dose Route Frequency Provider Last Rate Last Admin    clevidipine (CLEVIPREX) 25 mg/50 mL infusion  0-16 mg/hr Intravenous Continuous Gita Ballard MD 2 mL/hr at 09/01/23 0600 1 mg/hr at 09/01/23 0600    fentaNYL 2500 mcg in 0.9% sodium chloride 250 mL infusion premix (titrating)  0-250 mcg/hr Intravenous Continuous PRN Jocelyn Lopez MD 2.5 mL/hr at 09/01/23 0000 25 mcg/hr at 09/01/23 0000    labetaloL injection 10 mg  10 mg Intravenous Q4H PRN Ramiro De Santiago DO        levETIRAcetam (Keppra) 500 mg in dextrose 5 % in water (D5W) 100 mL IVPB (MB+)  500 mg Intravenous Q12H Ramiro De Santiago DO   Stopped at 09/01/23 1023    mupirocin 2 % ointment   Nasal BID Roberto Biswas Jr.,  MD, FCCP   Given at 09/01/23 0953    propofol (DIPRIVAN) 10 mg/mL infusion  0-50 mcg/kg/min Intravenous Continuous Roberto Biswas Jr., MD, FCCP 12 mL/hr at 09/01/23 0958 40 mcg/kg/min at 09/01/23 0958    sodium chloride 0.9% flush 10 mL  10 mL Intravenous Q6H Jocelyn Lopez MD   10 mL at 09/01/23 1138    And    sodium chloride 0.9% flush 10 mL  10 mL Intravenous PRN Jocelyn Lopez MD        sodium chloride 3% HYPERTONIC solution  50 mL/hr Intravenous Continuous Ally Mccoy PA 50 mL/hr at 08/31/23 2138 50 mL/hr at 08/31/23 2138       Review of Systems   Unable to perform ROS: Intubated     Objective:     Vital Signs (Most Recent):  Temp: 98.3 °F (36.8 °C) (09/01/23 0800)  Pulse: 74 (09/01/23 1100)  Resp: 20 (09/01/23 1100)  BP: 108/69 (09/01/23 1100)  SpO2: 99 % (09/01/23 1100) Vital Signs (24h Range):  Temp:  [97 °F (36.1 °C)-98.4 °F (36.9 °C)] 98.3 °F (36.8 °C)  Pulse:  [] 74  Resp:  [15-29] 20  SpO2:  [96 %-100 %] 99 %  BP: ()/(54-83) 108/69  Arterial Line BP: (107-155)/(49-83) 119/57     Weight: 50 kg (110 lb 3.7 oz)  Body mass index is 17.8 kg/m².     Physical Exam  Vitals and nursing note reviewed.   Constitutional:       General: She is not in acute distress.     Appearance: She is not toxic-appearing.      Interventions: She is sedated and intubated.   Eyes:      Pupils:      Right eye: Pupil is not round. Pupil is reactive.      Left eye: Pupil is round and reactive.   Pulmonary:      Effort: She is intubated.   Musculoskeletal:         General: Normal range of motion.      Cervical back: Normal range of motion.      Right lower leg: No edema.      Left lower leg: No edema.   Skin:     General: Skin is warm and dry.      Capillary Refill: Capillary refill takes less than 2 seconds.   Neurological:      Comments:     Limited PE 2/2 intubation and sedation  Withdraws from painful stim BUE, BLE  Right pupil irregular shape, both pupils appear to be reactive  Gaze midline                 Significant Labs:   Recent Lab Results  (Last 5 results in the past 24 hours)        09/01/23  0808   09/01/23  0407   09/01/23  0219   09/01/23  0007   08/31/23  1510        Albumin/Globulin Ratio     1.4           Albumin     3.6           Alkaline Phosphatase     64           Allens Test         N/A       ALT     17           AST     33           Baso #     0.02           Basophil %     0.2           BILIRUBIN TOTAL     0.9           BUN     4.0           Calcium     8.2           Ionized Calcium         1.09       Chloride     106           CO2     20           Creatinine     0.47           Drawn by         sft rrt       eGFR     >60           Eos #     0.01           Eosinophil %     0.1           Estimated Avg Glucose     108.3           FIO2, Blood gas         30       Globulin, Total     2.5           Glucose     121           Hematocrit     43.8           Hemoglobin     15.1           Hemoglobin A1C External     5.4           Immature Grans (Abs)     0.03           Immature Granulocytes     0.3           Lymph #     0.67           LYMPH %     5.9           Magnesium      1.60           MCH     32.0           MCHC     34.5           MCV     92.8           Mech Vt         450       MODE         AC       Mono #     0.56           Mono %     4.9           MPV     10.0           Neut #     10.12           Neut %     88.6           nRBC     0.0           Oxygen Device, Blood gas         Ventilator       PEEP         5.0       Phosphorus     2.2           Platelets     167           Base Excess, Blood gas         1.60       POC HCO3         25.9       POC PCO2         39.0       POC PH         7.430       POC PO2         84.0       Potassium, Blood Gas         2.9       Sodium, Blood Gas         129       Potassium     3.2           PROTEIN TOTAL     6.1           PS         10.0       RBC     4.72           RDW     13.2           Mech RR         20       Sample site         Arterial Line       Sample Type          Arterial Blood       sO2, Blood gas         96.6       Sodium 137   134   135   134         TOC2, Blood gas         14.9       Thyroid Stimulating Hormone     3.451           WBC     11.41                                  Significant Imaging: I have reviewed all pertinent imaging results/findings within the past 24 hours.    Assessment and Plan:     Intracerebral hemorrhage  Intracerebral hemorrhage and traumatic SAH    Planning for cerebral angio today to repair a fistula    -Recommend SBP less than 140  -Q1 hour neuro checks  -Avoid antiplatelets/anticoagulation  -Seizure precautions   -neurosurgery following, no surgical intervention          Further recommendations to follow from MD    VTE Risk Mitigation (From admission, onward)         Ordered     Reason for No Pharmacological VTE Prophylaxis  Once        Question:  Reasons:  Answer:  Risk of Bleeding    08/31/23 1659     IP VTE HIGH RISK PATIENT  Once         08/31/23 1659     Place sequential compression device  Until discontinued         08/31/23 1659                Dawna Thomas NP  Neurology  [facility] - [facility] ICU

## 2023-09-01 NOTE — BRIEF OP NOTE
Name: Bozena Shelton  MRN: [id number]  Age: 73 y.o.  Gender: female    Date of Procedure: 09/01/2023    Pre-operative Diagnosis: SM2 left temporal AVM, Left MMA AV fistula, left temporal ICH    Post-operative Diagnosis: unsuccessful embolization of the left MMA AV fistula    Procedure: Cerebral angiogram    Access/Closure: Right radial artery access closed with TR band    Surgeon: Yaakov South MD    Anesthesia: GA    EBL: min    Description of findings:  - unsuccessful attempt at embolization of the left MMA due to tortuosity     Patient condition:   stable    Implant/specimen(s):  none    Plan:  - -140  - CTH in AM  - plan to wean off ventilator   - will discuss outpatient embolization of the left temporal AVM  - f/u with Dr South in 2 weeks after discharge    Yaakov South MD  Interventional Neurology

## 2023-09-01 NOTE — PROCEDURES
"Bozena Shelton is a 73 y.o. female patient.    Temp: 97 °F (36.1 °C) (08/31/23 1600)  Pulse: 87 (08/31/23 1800)  Resp: 20 (08/31/23 1800)  BP: 113/72 (08/31/23 1800)  SpO2: 100 % (08/31/23 1800)  Weight: 50 kg (110 lb 3.7 oz) (08/30/23 2255)  Height: 5' 6" (167.6 cm) (08/30/23 2255)    PICC  Date/Time: 8/31/2023 9:13 PM  Performed by: Nikole Ashraf RN  Consent Done: Yes  Time out: Immediately prior to procedure a time out was called to verify the correct patient, procedure, equipment, support staff and site/side marked as required  Indications: med administration and vascular access  Anesthesia: local infiltration  Local anesthetic: lidocaine 1% without epinephrine    Preparation: skin prepped with ChloraPrep  Skin prep agent dried: skin prep agent completely dried prior to procedure  Sterile barriers: all five maximum sterile barriers used - cap, mask, sterile gown, sterile gloves, and large sterile sheet  Hand hygiene: hand hygiene performed prior to central venous catheter insertion  Location details: right brachial  Catheter type: triple lumen  Catheter size: 5 Fr  Catheter Length: 33cm    Ultrasound guidance: yes  Vessel Caliber: patent, compressibility normal  Needle advanced into vessel with real time Ultrasound guidance.  Guidewire confirmed in vessel.  Sterile sheath used.  Number of attempts: 1  Post-procedure: blood return through all ports, chlorhexidine patch and sterile dressing applied            Name Nikole Ashraf RN   8/31/2023    "

## 2023-09-01 NOTE — ANESTHESIA PREPROCEDURE EVALUATION
09/01/2023  Bozena Shelton is a 73 y.o., female.    Hemorrhagic CVA  Intubated/Sedated  Pre-op Assessment    I have reviewed the Patient Summary Reports.     I have reviewed the Nursing Notes. I have reviewed the NPO Status.   I have reviewed the Medications.     Review of Systems      Physical Exam  General: Unconscious    Airway:  Mallampati: unable to assess   Pre-Existing Airway: Oral Endotracheal tube        Anesthesia Plan  Type of Anesthesia, risks & benefits discussed:    Anesthesia Type: Gen ETT  Intra-op Monitoring Plan: Standard ASA Monitors  Post Op Pain Control Plan: multimodal analgesia  Induction:  IV  Airway Plan: Direct, Post-Induction  Informed Consent: Informed consent signed with the Patient and all parties understand the risks and agree with anesthesia plan.  All questions answered. Patient consented to blood products? Yes  ASA Score: 4  Day of Surgery Review of History & Physical: H&P Update referred to the surgeon/provider.    Ready For Surgery From Anesthesia Perspective.     .

## 2023-09-01 NOTE — ANESTHESIA POSTPROCEDURE EVALUATION
Anesthesia Post Evaluation    Patient: Bozena Nikita    Procedure(s) Performed: * No procedures listed *    Final Anesthesia Type: general      Patient location during evaluation: PACU  Patient participation: Yes- Able to Participate  Level of consciousness: awake and alert  Post-procedure vital signs: reviewed and stable  Pain management: adequate  Airway patency: patent      Anesthetic complications: no      Cardiovascular status: hemodynamically stable  Respiratory status: unassisted  Hydration status: euvolemic  Follow-up not needed.          Vitals Value Taken Time   /74 09/01/23 1201   Temp 36.8 °C (98.3 °F) 09/01/23 0800   Pulse 67 09/01/23 1215   Resp 20 09/01/23 1215   SpO2 100 % 09/01/23 1215   Vitals shown include unvalidated device data.      No case tracking events are documented in the log.      Pain/Molly Score: Pain Rating Prior to Med Admin: 0 (9/1/2023 12:00 AM)

## 2023-09-01 NOTE — PROGRESS NOTES
[facility] - Emergency Dept  Pulmonary Critical Care Note    Patient Name: Bozena Shelton  MRN: [id number]  Admission Date: 8/30/2023  Hospital Length of Stay: 2 days  Code Status: Full Code  Attending Provider: Roberto Biswas Jr., MD,*  Primary Care Provider: Shannan, Primary Doctor     Subjective:     HPI:   Patient is a 73-year-old female with no past medical history on file who presented to the ED on 08/30 via EMS after sustaining a fall at home.  Per EMS, patient fell off the side of the bed but was caught by her .  Denied loss of consciousness and head trauma.  EMS reported that the patient was confused on arrival and having slurred speech with right-sided facial droop.  Last known normal time was around 2100 today.  Patient's family was not present in the ED during my evaluation of the patient and the patient was not able to contribute to history.    In the ED, CT head was conducted which displayed evidence of left hemorrhagic CVA.  Neurosurgery was consulted who recommended blood pressure control with systolic blood pressure less than 140 and repeat head CT in 6 hours.  Keppra for seizure prophylaxis.  Patient was admitted to the ICU for further care and management.    Hospital Course/Significant events:  8/30/23: admit to ICU for L hemorrhagic CVA  8/31/23: underwent diagnostic cerebral angiogram with interventional neurology    24 Hour Interval History:  Seen and examined this morning. On cleviprex gtt to maintain BP parameters. Intubated yesterday for cerebral angiography with interventional neurology which revealed Spetzler Ayaan grade 2 Left temporal AVM, left MMA AV fistula. She remained intubated afterwards for   Embolization of L MMA AV fistula with interventional neurology today.     History reviewed. No pertinent past medical history.    History reviewed. No pertinent surgical history.  Social History     Socioeconomic History    Marital status:      No current outpatient  medications    Current Inpatient Medications   levETIRAcetam (Keppra) IV (PEDS and ADULTS)  500 mg Intravenous Q12H    mupirocin   Nasal BID    sodium chloride 0.9%  10 mL Intravenous Q6H       Current Intravenous Infusions   clevidipine 1 mg/hr (09/01/23 0600)    fentanyl 25 mcg/hr (09/01/23 0000)    propofoL 40 mcg/kg/min (09/01/23 0200)    sodium chloride 3% HYPERTONIC 50 mL/hr (08/31/23 2138)     Review of Systems   Unable to perform ROS: Intubated      Objective:       Intake/Output Summary (Last 24 hours) at 9/1/2023 0852  Last data filed at 9/1/2023 0800  Gross per 24 hour   Intake 500 ml   Output 2595 ml   Net -2095 ml         Vital Signs (Most Recent):  Temp: 98.3 °F (36.8 °C) (09/01/23 0800)  Pulse: (!) 111 (09/01/23 0800)  Resp: (!) 22 (09/01/23 0800)  BP: 135/76 (09/01/23 0800)  SpO2: 98 % (09/01/23 0800)  Body mass index is 17.79 kg/m².  Weight: 50 kg (110 lb 3.7 oz) Vital Signs (24h Range):  Temp:  [97 °F (36.1 °C)-98.4 °F (36.9 °C)] 98.3 °F (36.8 °C)  Pulse:  [] 111  Resp:  [12-29] 22  SpO2:  [76 %-100 %] 98 %  BP: ()/(56-83) 135/76  Arterial Line BP: (107-155)/(49-83) 155/77     Physical Exam  Vitals and nursing note reviewed.   Constitutional:       Comments: Intubated, sedated   HENT:      Head: Normocephalic and atraumatic.   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  Left pupil is approximately 3 mm in diameter.   Cardiovascular:      Rate and Rhythm: Normal rate and regular rhythm.      Pulses: Normal pulses.      Heart sounds: Normal heart sounds.   Pulmonary:      Effort: Pulmonary effort is normal.      Breath sounds: Normal breath sounds.   Abdominal:      General: There is no distension.      Palpations: Abdomen is soft.   Musculoskeletal:         General: Signs of injury present. No swelling.   Skin:     General: Skin is warm and dry.   Neurological:      Comments: Unable to fully assess  Intubated and sedated         Lines/Drains/Airways       Peripherally Inserted Central Catheter Line  Duration             PICC Triple Lumen 08/31/23 2112 right brachial <1 day              Drain  Duration                  Urethral Catheter 08/31/23 0130 Temperature probe 16 Fr. 1 day              Airway  Duration                  Airway - Non-Surgical 08/31/23 1143 <1 day              Arterial Line  Duration             Arterial Line 08/31/23 1147 Left Radial <1 day              Peripheral Intravenous Line  Duration                  Peripheral IV - Single Lumen 08/31/23 1400 20 G Distal;Posterior;Right Forearm <1 day         Peripheral IV - Single Lumen 08/31/23 1401 20 G Right Antecubital <1 day                    Significant Labs:    Lab Results   Component Value Date    WBC 11.41 09/01/2023    HGB 15.1 09/01/2023    HCT 43.8 09/01/2023    MCV 92.8 09/01/2023     09/01/2023     BMP  Lab Results   Component Value Date     09/01/2023    K 3.2 (L) 09/01/2023    CHLORIDE 106 09/01/2023    CO2 20 (L) 09/01/2023    BUN 4.0 (L) 09/01/2023    CREATININE 0.47 (L) 09/01/2023    CALCIUM 8.2 (L) 09/01/2023     ABG  Recent Labs   Lab 08/31/23  1510   PH 7.430   PO2 84.0   PCO2 39.0   HCO3 25.9   POCBASEDEF 1.60       Vent Mode: A/C (09/01/23 0445)  Ventilator Initiated: Yes (08/31/23 1310)  Set Rate: 20 BPM (09/01/23 0445)  Vt Set: 450 mL (09/01/23 0445)  PEEP/CPAP: 5 cmH20 (09/01/23 0445)  Oxygen Concentration (%): 30 (09/01/23 0445)  Peak Airway Pressure: 22 cmH20 (09/01/23 0445)  Total Ve: 6.8 L/m (09/01/23 0445)  F/VT Ratio<105 (RSBI): (!) 57.8 (09/01/23 0445)  Significant Imaging:  I have reviewed the pertinent imaging within the past 24 hours.  Assessment/Plan:     Assessment  Left-sided hemorrhagic CVA as demonstrated on CT head with no evidence of midline shift  Intubated 8/31 for procedures with interventional neurology     Plan  1. Patient admitted to ICU for further care and management   2.Continue cleviprex to maintain BP  goal of <140  3. Keppra for seizure prophylaxis 500 b.i.d.  4. Q1hr neuro checks, hold antiplatelets/anticoagulation  5. Interventional Neurology with plans for embolization of left MMA AV fistula  6. Patient to remain intubated until procedures with interventional neurology are complete    45 minutes of critical care was time spent personally by me on the following activities: development of treatment plan with patient or surrogate and bedside caregivers, discussions with consultants, evaluation of patient's response to treatment, examination of patient, ordering and performing treatments and interventions, ordering and review of laboratory studies, ordering and review of radiographic studies, pulse oximetry, re-evaluation of patient's condition.  This critical care time did not overlap with that of any other provider or involve time for any procedures.     Layla Rider MD  Pulmonary Critical Care Medicine  [facility] - Emergency Dept

## 2023-09-02 PROBLEM — Q28.2 AVM (ARTERIOVENOUS MALFORMATION) BRAIN: Status: ACTIVE | Noted: 2023-09-02

## 2023-09-02 PROBLEM — Q28.2: Status: ACTIVE | Noted: 2023-09-02

## 2023-09-02 PROBLEM — I61.9 HEMORRHAGIC CEREBROVASCULAR ACCIDENT (CVA): Status: ACTIVE | Noted: 2023-09-02

## 2023-09-02 LAB
ALBUMIN SERPL-MCNC: 2.9 G/DL (ref 3.4–4.8)
ALBUMIN/GLOB SERPL: 1 RATIO (ref 1.1–2)
ALLENS TEST BLOOD GAS (OHS): ABNORMAL
ALP SERPL-CCNC: 54 UNIT/L (ref 40–150)
ALT SERPL-CCNC: 16 UNIT/L (ref 0–55)
ANION GAP SERPL CALC-SCNC: 8 MEQ/L
AST SERPL-CCNC: 29 UNIT/L (ref 5–34)
BASE EXCESS BLD CALC-SCNC: -2.8 MMOL/L
BASOPHILS # BLD AUTO: 0.03 X10(3)/MCL
BASOPHILS NFR BLD AUTO: 0.3 %
BILIRUB SERPL-MCNC: 0.5 MG/DL
BLOOD GAS SAMPLE TYPE (OHS): ABNORMAL
BUN SERPL-MCNC: 3.5 MG/DL (ref 9.8–20.1)
BUN SERPL-MCNC: 3.8 MG/DL (ref 9.8–20.1)
CA-I BLD-SCNC: 1.14 MMOL/L (ref 1.12–1.23)
CALCIUM SERPL-MCNC: 7.7 MG/DL (ref 8.4–10.2)
CALCIUM SERPL-MCNC: 8 MG/DL (ref 8.4–10.2)
CHLORIDE SERPL-SCNC: 115 MMOL/L (ref 98–107)
CHLORIDE SERPL-SCNC: 117 MMOL/L (ref 98–107)
CO2 BLDA-SCNC: 21.6 MMOL/L
CO2 SERPL-SCNC: 19 MMOL/L (ref 23–31)
CO2 SERPL-SCNC: 20 MMOL/L (ref 23–31)
CREAT SERPL-MCNC: 0.46 MG/DL (ref 0.55–1.02)
CREAT SERPL-MCNC: 0.47 MG/DL (ref 0.55–1.02)
CREAT/UREA NIT SERPL: 7
DRAWN BY BLOOD GAS (OHS): ABNORMAL
EOSINOPHIL # BLD AUTO: 0 X10(3)/MCL (ref 0–0.9)
EOSINOPHIL NFR BLD AUTO: 0 %
ERYTHROCYTE [DISTWIDTH] IN BLOOD BY AUTOMATED COUNT: 14 % (ref 11.5–17)
FIO2 BLOOD GAS (OHS): 40 %
FLOW (OHS): 10 LPM
GFR SERPLBLD CREATININE-BSD FMLA CKD-EPI: >60 MLS/MIN/1.73/M2
GFR SERPLBLD CREATININE-BSD FMLA CKD-EPI: >60 MLS/MIN/1.73/M2
GLOBULIN SER-MCNC: 2.9 GM/DL (ref 2.4–3.5)
GLUCOSE SERPL-MCNC: 114 MG/DL (ref 82–115)
GLUCOSE SERPL-MCNC: 89 MG/DL (ref 82–115)
HCO3 BLDA-SCNC: 20.6 MMOL/L
HCT VFR BLD AUTO: 38.9 % (ref 37–47)
HGB BLD-MCNC: 13.4 G/DL (ref 12–16)
IMM GRANULOCYTES # BLD AUTO: 0.03 X10(3)/MCL (ref 0–0.04)
IMM GRANULOCYTES NFR BLD AUTO: 0.3 %
LYMPHOCYTES # BLD AUTO: 0.55 X10(3)/MCL (ref 0.6–4.6)
LYMPHOCYTES NFR BLD AUTO: 5.4 %
MAGNESIUM SERPL-MCNC: 1.8 MG/DL (ref 1.6–2.6)
MCH RBC QN AUTO: 32.6 PG (ref 27–31)
MCHC RBC AUTO-ENTMCNC: 34.4 G/DL (ref 33–36)
MCV RBC AUTO: 94.6 FL (ref 80–94)
MODE (OHS): ABNORMAL
MONOCYTES # BLD AUTO: 0.62 X10(3)/MCL (ref 0.1–1.3)
MONOCYTES NFR BLD AUTO: 6.1 %
NEUTROPHILS # BLD AUTO: 8.97 X10(3)/MCL (ref 2.1–9.2)
NEUTROPHILS NFR BLD AUTO: 87.9 %
NRBC BLD AUTO-RTO: 0 %
OXYGEN DEVICE BLOOD GAS (OHS): ABNORMAL
PCO2 BLDA: 31 MMHG (ref 35–45)
PEEP (OHS): 5 CMH2O
PH BLDA: 7.43 [PH] (ref 7.35–7.45)
PHOSPHATE SERPL-MCNC: 1.3 MG/DL (ref 2.3–4.7)
PLATELET # BLD AUTO: 143 X10(3)/MCL (ref 130–400)
PMV BLD AUTO: 10.1 FL (ref 7.4–10.4)
PO2 BLDA: 71 MMHG (ref 80–100)
POTASSIUM BLOOD GAS (OHS): 3 MMOL/L (ref 3.5–5)
POTASSIUM SERPL-SCNC: 2.9 MMOL/L (ref 3.5–5.1)
POTASSIUM SERPL-SCNC: 3 MMOL/L (ref 3.5–5.1)
PROT SERPL-MCNC: 5.8 GM/DL (ref 5.8–7.6)
RBC # BLD AUTO: 4.11 X10(6)/MCL (ref 4.2–5.4)
SAMPLE SITE BLOOD GAS (OHS): ABNORMAL
SAO2 % BLDA: 95 %
SODIUM BLOOD GAS (OHS): 144 MMOL/L (ref 137–145)
SODIUM SERPL-SCNC: 143 MMOL/L (ref 136–145)
SODIUM SERPL-SCNC: 144 MMOL/L (ref 136–145)
SODIUM SERPL-SCNC: 145 MMOL/L (ref 136–145)
SODIUM SERPL-SCNC: 149 MMOL/L (ref 136–145)
SODIUM SERPL-SCNC: 149 MMOL/L (ref 136–145)
WBC # SPEC AUTO: 10.2 X10(3)/MCL (ref 4.5–11.5)

## 2023-09-02 PROCEDURE — 63600175 PHARM REV CODE 636 W HCPCS

## 2023-09-02 PROCEDURE — 99900026 HC AIRWAY MAINTENANCE (STAT)

## 2023-09-02 PROCEDURE — 37799 UNLISTED PX VASCULAR SURGERY: CPT

## 2023-09-02 PROCEDURE — 84295 ASSAY OF SERUM SODIUM: CPT | Performed by: STUDENT IN AN ORGANIZED HEALTH CARE EDUCATION/TRAINING PROGRAM

## 2023-09-02 PROCEDURE — 99232 PR SUBSEQUENT HOSPITAL CARE,LEVL II: ICD-10-PCS | Mod: ,,, | Performed by: PSYCHIATRY & NEUROLOGY

## 2023-09-02 PROCEDURE — 94761 N-INVAS EAR/PLS OXIMETRY MLT: CPT

## 2023-09-02 PROCEDURE — C9248 INJ, CLEVIDIPINE BUTYRATE: HCPCS | Mod: JZ,JG | Performed by: EMERGENCY MEDICINE

## 2023-09-02 PROCEDURE — 63600175 PHARM REV CODE 636 W HCPCS: Performed by: PHYSICIAN ASSISTANT

## 2023-09-02 PROCEDURE — 99232 SBSQ HOSP IP/OBS MODERATE 35: CPT | Mod: ,,, | Performed by: NEUROLOGICAL SURGERY

## 2023-09-02 PROCEDURE — 20000000 HC ICU ROOM

## 2023-09-02 PROCEDURE — 27200966 HC CLOSED SUCTION SYSTEM

## 2023-09-02 PROCEDURE — 80048 BASIC METABOLIC PNL TOTAL CA: CPT | Performed by: STUDENT IN AN ORGANIZED HEALTH CARE EDUCATION/TRAINING PROGRAM

## 2023-09-02 PROCEDURE — 63600175 PHARM REV CODE 636 W HCPCS: Performed by: INTERNAL MEDICINE

## 2023-09-02 PROCEDURE — 63600175 PHARM REV CODE 636 W HCPCS: Mod: JZ,JG | Performed by: EMERGENCY MEDICINE

## 2023-09-02 PROCEDURE — 82803 BLOOD GASES ANY COMBINATION: CPT

## 2023-09-02 PROCEDURE — A4216 STERILE WATER/SALINE, 10 ML: HCPCS

## 2023-09-02 PROCEDURE — 25000003 PHARM REV CODE 250

## 2023-09-02 PROCEDURE — 27100171 HC OXYGEN HIGH FLOW UP TO 24 HOURS

## 2023-09-02 PROCEDURE — 99900035 HC TECH TIME PER 15 MIN (STAT)

## 2023-09-02 PROCEDURE — 25000003 PHARM REV CODE 250: Performed by: INTERNAL MEDICINE

## 2023-09-02 PROCEDURE — 94003 VENT MGMT INPAT SUBQ DAY: CPT

## 2023-09-02 PROCEDURE — 99232 PR SUBSEQUENT HOSPITAL CARE,LEVL II: ICD-10-PCS | Mod: ,,, | Performed by: NEUROLOGICAL SURGERY

## 2023-09-02 PROCEDURE — 99232 SBSQ HOSP IP/OBS MODERATE 35: CPT | Mod: ,,, | Performed by: PSYCHIATRY & NEUROLOGY

## 2023-09-02 RX ORDER — DEXMEDETOMIDINE HYDROCHLORIDE 4 UG/ML
0-1.4 INJECTION, SOLUTION INTRAVENOUS CONTINUOUS
Status: DISCONTINUED | OUTPATIENT
Start: 2023-09-02 | End: 2023-09-05

## 2023-09-02 RX ADMIN — LEVETIRACETAM 500 MG: 100 INJECTION, SOLUTION INTRAVENOUS at 08:09

## 2023-09-02 RX ADMIN — MUPIROCIN: 20 OINTMENT TOPICAL at 09:09

## 2023-09-02 RX ADMIN — DEXMEDETOMIDINE HYDROCHLORIDE 1 MCG/KG/HR: 400 INJECTION INTRAVENOUS at 02:09

## 2023-09-02 RX ADMIN — SODIUM CHLORIDE, PRESERVATIVE FREE 10 ML: 5 INJECTION INTRAVENOUS at 12:09

## 2023-09-02 RX ADMIN — SODIUM CHLORIDE 50 ML/HR: 3 INJECTION, SOLUTION INTRAVENOUS at 06:09

## 2023-09-02 RX ADMIN — CLEVIPIDINE 4 MG/HR: 0.5 EMULSION INTRAVENOUS at 08:09

## 2023-09-02 RX ADMIN — SODIUM CHLORIDE, PRESERVATIVE FREE 10 ML: 5 INJECTION INTRAVENOUS at 06:09

## 2023-09-02 RX ADMIN — DEXMEDETOMIDINE HYDROCHLORIDE 0.6 MCG/KG/HR: 400 INJECTION INTRAVENOUS at 11:09

## 2023-09-02 RX ADMIN — PROPOFOL 50 MCG/KG/MIN: 10 INJECTION, EMULSION INTRAVENOUS at 04:09

## 2023-09-02 RX ADMIN — LEVETIRACETAM 500 MG: 100 INJECTION, SOLUTION INTRAVENOUS at 09:09

## 2023-09-02 RX ADMIN — MUPIROCIN: 20 OINTMENT TOPICAL at 08:09

## 2023-09-02 NOTE — PROGRESS NOTES
Hospital ICU Progress Note  [facility]      Admit Date: 8/30/2023  Post-operative Day:    Hospital Day: 4    SUBJECTIVE:     Interval History:  Mr. Shelton remains intubated and sedated.  She had an attempted embolization of her left AV fistula by Dr. South yesterday on 09/01/2023, but the procedure was not able to be completed due to vessel tortuosity.  The patient continues to be on propofol, fentanyl, and Cleviprex gtts. She moves her left UE and LE extremities spontaneously minimal withdrawal in her R UE and R LE.    Her Na is elevated at 149 and her 3% Na Cl gtt has been discontinued.     Scheduled Meds:   levETIRAcetam (Keppra) IV (PEDS and ADULTS)  500 mg Intravenous Q12H    mupirocin   Nasal BID    sodium chloride 0.9%  10 mL Intravenous Q6H     Continuous Infusions:   clevidipine 4 mg/hr (09/02/23 0832)    dexmedeTOMIDine (Precedex) infusion (titrating) 1 mcg/kg/hr (09/02/23 1400)    fentanyl 25 mcg/hr (09/01/23 1845)     PRN Meds:fentanyl, labetalol, Flushing PICC Protocol **AND** sodium chloride 0.9% **AND** sodium chloride 0.9%    Review of patient's allergies indicates:  Not on File    History reviewed. No pertinent past medical history.  History reviewed. No pertinent surgical history.    OBJECTIVE:     Vital Signs (Most Recent)  Temp: 99.1 °F (37.3 °C) (09/02/23 1600)  Pulse: 104 (09/02/23 1400)  Resp: 19 (09/02/23 1400)  BP: (!) 151/85 (09/02/23 1400)  SpO2: 99 % (09/02/23 1400)    Vital Signs Range (Last 24H):  Temp:  [99 °F (37.2 °C)-99.4 °F (37.4 °C)]   Pulse:  []   Resp:  [10-28]   BP: ()/(55-88)   SpO2:  [95 %-100 %]   Arterial Line BP: ()/()       Physical Exam:  GCS- 7T, sedated  E-1, V-1T, M-5     Eyes closed to pain  Pupil on the right is postsurgical- 5 mm, irregular and fixed; left pupil 3 mm, reactive  Intubated, non-verbal, aphasic  Does not follow commands, but localizes briskly in L UE and L LE  Withdraws R LE and R  "LE      Laboratory Results:  CBC without Differential:  Lab Results   Component Value Date    WBC 10.20 09/01/2023    HGB 13.4 09/01/2023    HCT 38.9 09/01/2023     09/01/2023    MCV 94.6 (H) 09/01/2023     Differential:   No results found for: "NEUTROABS", "LYMPHSABS", "BASOSABS", "MONOSABS"    Basic Metabolic Panel:  Lab Results   Component Value Date     (H) 09/02/2023    K 2.9 (L) 09/02/2023    BUN 3.5 (L) 09/02/2023    MG 1.80 09/01/2023    PHOS 1.3 (L) 09/01/2023     Coagulation Studies:  Lab Results   Component Value Date    INR 1.1 08/30/2023    INR 1.1 08/30/2023    PROTIME 13.9 08/30/2023     Lab Results   Component Value Date    PTT 26.7 08/30/2023     Urinalysis:  Lab Results   Component Value Date    PHURINE 7.5 08/31/2023    LEUKOCYTESUR Negative 08/31/2023    UROBILINOGEN 0.2 08/31/2023        ASSESSMENT/PLAN:     Ms. Shelton is a 73-year-old female who presented to the ER 2 days ago on 08/31/2023 with a fall off the side of her bed.  She has had altered mental status with right-sided weakness.  The patient currently is intubated and sedated on propofol and fentanyl gtts.  She has a GCS 7T with localization in her L UE and L LE with withdrawal in her R UE and R LE.    A CT head without contrast completed on 08/31/2023 was reviewed. When compared to a CT head without contrast on 08/30/2023, there have been no significant changes.  There continues to be a stable posterior left temporal acute intraparenchymal hemorrhage measuring 4.5 x 4.5 cm with minimal intraventricular hemorrhage on the left.  This intraparenchymal hemorrhage has minimal surrounding edema.  There is subarachnoid hemorrhage along the left sylvian fissure.  There is no significant midline shift.  No skull fractures.  A cerebral angiogram demonstrates a left temporal AVM and left middle meningeal artery AV fistula draining into the superior sagittal sinus.      Plan:     1.  The patient remains intubated and sedated.  " Continue close monitoring in the ICU with Q1 hr neuro checks.      2.  I have discussed this case extensively with neurovascular neurologist Dr. South.       3.  AV fistula embolization by Dr. South has not been able to be completed secondary to vascular tortuosity.    5.  Neurovascular neurosurgery is not within the scope of my practice.  Knowing this, Dr. South has already spoken with neurosurgeon Dr. Barron, who has agreed to be involved with her case at the time of embolization of her AVM in 3-4 weeks.  Dr. South's plan is that should Ms. Shelton have an episode of acute rebleeding of her AVM during her inpatient stay, the plan will be to notify Dr. Barron emergently and if not available, transfer her care to a tertiary facility such as [facility] in [place] to a neurovascular neurosurgeon.     6.  Systolic blood pressure goal is 100-140.  The patient is currently on a Cleviprex gtt.     7.  Ms. Shelton is now hypernatremic with a Na of 149.  Her 3% Na Cl gtt has been discontinued.     8.  The patient will continue to be followed by Neurosurgery as an inpatient on an as-needed basis for any concerning changes in condition or symptoms.  Please do not hesitate to contact Neurosurgery for any additional questions.        Bella Birch MD  Neurosurgery

## 2023-09-02 NOTE — NURSING
Nurses Note -- 4 Eyes      9/2/2023   6:20 AM      Skin assessed during: Daily Assessment      [] No Altered Skin Integrity Present    [x]Prevention Measures Documented      [x] Yes- Altered Skin Integrity Present or Discovered   [] LDA Added if Not in Epic (Describe Wound)   [] New Altered Skin Integrity was Present on Admit and Documented in LDA   [] Wound Image Taken    Wound Care Consulted? No    Attending Nurse:  Nakia Moreland RN/Staff Member:  KALYAN varner

## 2023-09-02 NOTE — PROGRESS NOTES
[facility] - Emergency Dept  Pulmonary Critical Care Note    Patient Name: Bozena Shelton  MRN: [id number]  Admission Date: 8/30/2023  Hospital Length of Stay: 3 days  Code Status: Full Code  Attending Provider: Roberto Biswas Jr., MD,*  Primary Care Provider: Shannan, Primary Doctor     Subjective:     HPI:   Patient is a 73-year-old female with no past medical history on file who presented to the ED on 08/30 via EMS after sustaining a fall at home.  Per EMS, patient fell off the side of the bed but was caught by her .  Denied loss of consciousness and head trauma.  EMS reported that the patient was confused on arrival and having slurred speech with right-sided facial droop.  Last known normal time was around 2100 today.  Patient's family was not present in the ED during my evaluation of the patient and the patient was not able to contribute to history.    In the ED, CT head was conducted which displayed evidence of left hemorrhagic CVA.  Neurosurgery was consulted who recommended blood pressure control with systolic blood pressure less than 140 and repeat head CT in 6 hours.  Keppra for seizure prophylaxis.  Patient was admitted to the ICU for further care and management.    Hospital Course/Significant events:  8/30/23: admit to ICU for L hemorrhagic CVA  8/31/23: underwent diagnostic cerebral angiogram with interventional neurology    24 Hour Interval History:  Unsuccessful embolization of the left middle meningeal artery arteriovenous fistula due to tortuosity yesterday with Interventional neurology. Patient was intubated  and sedated for yesterday's procedure. Will wean off sedation to see patient's neurological status if patient can be extubated if no other plans for procedure by Interventional neurology. Afebrile overnight. Off of cleviprex ggt. Continue q1 hr neuro checks.    History reviewed. No pertinent past medical history.    History reviewed. No pertinent surgical history.  Social History      Socioeconomic History    Marital status:      No current outpatient medications    Current Inpatient Medications   levETIRAcetam (Keppra) IV (PEDS and ADULTS)  500 mg Intravenous Q12H    mupirocin   Nasal BID    sodium chloride 0.9%  10 mL Intravenous Q6H       Current Intravenous Infusions   clevidipine 1 mg/hr (09/01/23 1845)    fentanyl 25 mcg/hr (09/01/23 1845)    propofoL 50 mcg/kg/min (09/02/23 0450)    sodium chloride 3% HYPERTONIC 50 mL/hr (09/02/23 0604)     Review of Systems   Unable to perform ROS: Intubated      Objective:       Intake/Output Summary (Last 24 hours) at 9/2/2023 0705  Last data filed at 9/2/2023 0619  Gross per 24 hour   Intake 6257.18 ml   Output 1595 ml   Net 4662.18 ml           Vital Signs (Most Recent):  Temp: 99.4 °F (37.4 °C) (09/02/23 0400)  Pulse: (!) 111 (09/02/23 0500)  Resp: 20 (09/02/23 0500)  BP: (!) 148/85 (09/02/23 0500)  SpO2: 97 % (09/02/23 0500)  Body mass index is 17.8 kg/m².  Weight: 50 kg (110 lb 3.7 oz) Vital Signs (24h Range):  Temp:  [98 °F (36.7 °C)-99.4 °F (37.4 °C)] 99.4 °F (37.4 °C)  Pulse:  [] 111  Resp:  [13-28] 20  SpO2:  [91 %-100 %] 97 %  BP: (100-148)/(54-99) 148/85  Arterial Line BP: ()/(48-77) 107/51     Physical Exam  Vitals and nursing note reviewed.   Constitutional:       Comments: Intubated, sedated   HENT:      Head: Normocephalic and atraumatic.   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  Left pupil is approximately 3 mm in diameter.   Cardiovascular:      Rate and Rhythm: Normal rate and regular rhythm.      Pulses: Normal pulses.      Heart sounds: Normal heart sounds.   Pulmonary:      Effort: Pulmonary effort is normal.      Breath sounds: Normal breath sounds.   Abdominal:      General: There is no distension.      Palpations: Abdomen is soft.   Musculoskeletal:         General: Signs of injury present. No swelling.   Skin:     General: Skin is warm  and dry.   Neurological:      Comments: Unable to fully assess  Intubated and sedated      Lines/Drains/Airways       Peripherally Inserted Central Catheter Line  Duration             PICC Triple Lumen 08/31/23 2112 right brachial 1 day              Drain  Duration                  Urethral Catheter 08/31/23 0130 Temperature probe 16 Fr. 2 days              Arterial Line  Duration             Arterial Line 08/31/23 1147 Left Radial 1 day              Peripheral Intravenous Line  Duration                  Peripheral IV - Single Lumen 08/31/23 1401 20 G Right Antecubital 1 day                    Significant Labs:    Lab Results   Component Value Date    WBC 10.20 09/01/2023    HGB 13.4 09/01/2023    HCT 38.9 09/01/2023    MCV 94.6 (H) 09/01/2023     09/01/2023     BMP  Lab Results   Component Value Date     09/02/2023    K 3.0 (L) 09/01/2023    CHLORIDE 115 (H) 09/01/2023    CO2 20 (L) 09/01/2023    BUN 3.8 (L) 09/01/2023    CREATININE 0.46 (L) 09/01/2023    CALCIUM 8.0 (L) 09/01/2023     ABG  Recent Labs   Lab 09/01/23  1542   PH 7.440   PO2 67.0*   PCO2 31.0*   HCO3 21.1   POCBASEDEF -2.20         Vent Mode: A/C (09/02/23 0400)  Ventilator Initiated: Yes (08/31/23 1310)  Set Rate: 20 BPM (09/02/23 0400)  Vt Set: 450 mL (09/02/23 0400)  Pressure Support: 10 cmH20 (09/01/23 1542)  PEEP/CPAP: 5 cmH20 (09/02/23 0400)  Oxygen Concentration (%): 30 (09/02/23 0400)  Peak Airway Pressure: 22 cmH20 (09/02/23 0400)  Total Ve: 9.6 L/m (09/02/23 0400)  F/VT Ratio<105 (RSBI): (!) 70.67 (09/02/23 0400)  Significant Imaging:  I have reviewed the pertinent imaging within the past 24 hours.  Assessment/Plan:     Assessment  Left-sided hemorrhagic CVA as demonstrated on CT head with no evidence of midline shift  Intubated 8/31 for procedures with interventional neurology     Plan  1. Patient admitted to ICU for further care and management   2.Need to maintain SBP goal of <140; off of cleviprex ggt at this time   3.  Keppra for seizure prophylaxis 500 b.i.d.  4. Q1hr neuro checks, hold antiplatelets/anticoagulation  5. Interventional Neurology unsuccessful for embolization of left MMA AV fistula on 9/1/23  6. Patient to remain intubated until procedures with interventional neurology are complete; if no plans for any other interventions, will likely wean off sedation and plan to extubate based on patient's condition.  7. Hold hypertonic 3% NS given Na level jumped from 135->145 this morning over 12 hours span; Avoid correction more than 6-12 in the first 24 hours; Will recheck BMP in 2 hours to monitor     45 minutes of critical care was time spent personally by me on the following activities: development of treatment plan with patient or surrogate and bedside caregivers, discussions with consultants, evaluation of patient's response to treatment, examination of patient, ordering and performing treatments and interventions, ordering and review of laboratory studies, ordering and review of radiographic studies, pulse oximetry, re-evaluation of patient's condition.  This critical care time did not overlap with that of any other provider or involve time for any procedures.     Chriss Becerra,   Pulmonary Critical Care Medicine  [facility] - Emergency Dept

## 2023-09-02 NOTE — PROGRESS NOTES
Neurointerventional progress note:    Subjective:   Patient extubated this AM. Currently on fentanyl gtt for sedation/agitation. Continues to remain aphasic. -140 without cleviprex.     Exam:  Vitals:    09/02/23 0810   BP:    Pulse: 64   Resp: 20   Temp:      Neuro Exam:  Obtunded, not following commands. Attempts to open eyes on verbal and painful stimulation. Pupils asymmetric R>L (at baseline per nurse). Right pupil is irregular and  chronic. Left pupil reactive.  NO obvious facial droop noted.   Moves left side spontaneously against gravity  Minimal spontaneous movement noted on right but on stimulation she withdraws. Moans to painful stimuli.  GCS 10 (E3, V2, M5)  Right wrist stable without hematoma. Distal pulse intact, right hand warm to touch    Imaging:   NO new imaging available for review.     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 She also has a left MMA AV fistula draining into the SSS. Attempted embolization of the AV fistula but was unsuccessful due to tortuosity in the left MMA.     Plan:  - PT/OT/ST   - -140  - repeat CTH today to assess ICH stability  - discussed with , ICU team regarding plan of care. Goal is to plan for AVM embolization in 3-4 weeks after the ICH has had the time to resolve.   - if patient has rebleeding during this hospital course, we will discuss operative management vs transfer to [place].   - rest of the care per ICU team.     Yaakov South MD  Vascular and Interventional Neurology

## 2023-09-02 NOTE — PT/OT/SLP PROGRESS
Per nurse, pt is not appropriate for therapy and is asking to sign off and will re-consult when appropriate.

## 2023-09-02 NOTE — PLAN OF CARE
Problem: Adult Inpatient Plan of Care  Goal: Patient-Specific Goal (Individualized)  Outcome: Ongoing, Progressing  Goal: Absence of Hospital-Acquired Illness or Injury  Outcome: Ongoing, Progressing     Problem: Infection  Goal: Absence of Infection Signs and Symptoms  Outcome: Ongoing, Progressing     Problem: Skin Injury Risk Increased  Goal: Skin Health and Integrity  Outcome: Ongoing, Progressing     Problem: Fall Injury Risk  Goal: Absence of Fall and Fall-Related Injury  Outcome: Ongoing, Progressing

## 2023-09-02 NOTE — PT/OT/SLP PROGRESS
Per PT and RN, pt not appropriate for therapy at this time.  Please re-consult when status improves.  Thank you

## 2023-09-03 LAB
ALBUMIN SERPL-MCNC: 3.2 G/DL (ref 3.4–4.8)
ALBUMIN/GLOB SERPL: 1.1 RATIO (ref 1.1–2)
ALP SERPL-CCNC: 57 UNIT/L (ref 40–150)
ALT SERPL-CCNC: 24 UNIT/L (ref 0–55)
AST SERPL-CCNC: 39 UNIT/L (ref 5–34)
BASOPHILS # BLD AUTO: 0.04 X10(3)/MCL
BASOPHILS NFR BLD AUTO: 0.4 %
BILIRUB SERPL-MCNC: 1.3 MG/DL
BUN SERPL-MCNC: 9.1 MG/DL (ref 9.8–20.1)
CALCIUM SERPL-MCNC: 8.7 MG/DL (ref 8.4–10.2)
CHLORIDE SERPL-SCNC: 114 MMOL/L (ref 98–107)
CO2 SERPL-SCNC: 21 MMOL/L (ref 23–31)
CREAT SERPL-MCNC: 0.5 MG/DL (ref 0.55–1.02)
EOSINOPHIL # BLD AUTO: 0.01 X10(3)/MCL (ref 0–0.9)
EOSINOPHIL NFR BLD AUTO: 0.1 %
ERYTHROCYTE [DISTWIDTH] IN BLOOD BY AUTOMATED COUNT: 13.8 % (ref 11.5–17)
GFR SERPLBLD CREATININE-BSD FMLA CKD-EPI: >60 MLS/MIN/1.73/M2
GLOBULIN SER-MCNC: 2.8 GM/DL (ref 2.4–3.5)
GLUCOSE SERPL-MCNC: 109 MG/DL (ref 82–115)
HCT VFR BLD AUTO: 39.6 % (ref 37–47)
HGB BLD-MCNC: 13.6 G/DL (ref 12–16)
IMM GRANULOCYTES # BLD AUTO: 0.05 X10(3)/MCL (ref 0–0.04)
IMM GRANULOCYTES NFR BLD AUTO: 0.5 %
LYMPHOCYTES # BLD AUTO: 0.68 X10(3)/MCL (ref 0.6–4.6)
LYMPHOCYTES NFR BLD AUTO: 6.4 %
MAGNESIUM SERPL-MCNC: 1.8 MG/DL (ref 1.6–2.6)
MCH RBC QN AUTO: 32.6 PG (ref 27–31)
MCHC RBC AUTO-ENTMCNC: 34.3 G/DL (ref 33–36)
MCV RBC AUTO: 95 FL (ref 80–94)
MONOCYTES # BLD AUTO: 0.65 X10(3)/MCL (ref 0.1–1.3)
MONOCYTES NFR BLD AUTO: 6.2 %
NEUTROPHILS # BLD AUTO: 9.13 X10(3)/MCL (ref 2.1–9.2)
NEUTROPHILS NFR BLD AUTO: 86.4 %
NRBC BLD AUTO-RTO: 0 %
PHOSPHATE SERPL-MCNC: 1.7 MG/DL (ref 2.3–4.7)
PLATELET # BLD AUTO: 138 X10(3)/MCL (ref 130–400)
PMV BLD AUTO: 10.3 FL (ref 7.4–10.4)
POTASSIUM SERPL-SCNC: 3.5 MMOL/L (ref 3.5–5.1)
PROT SERPL-MCNC: 6 GM/DL (ref 5.8–7.6)
RBC # BLD AUTO: 4.17 X10(6)/MCL (ref 4.2–5.4)
SODIUM SERPL-SCNC: 147 MMOL/L (ref 136–145)
WBC # SPEC AUTO: 10.56 X10(3)/MCL (ref 4.5–11.5)

## 2023-09-03 PROCEDURE — 83735 ASSAY OF MAGNESIUM: CPT

## 2023-09-03 PROCEDURE — 25000003 PHARM REV CODE 250

## 2023-09-03 PROCEDURE — 25000003 PHARM REV CODE 250: Performed by: STUDENT IN AN ORGANIZED HEALTH CARE EDUCATION/TRAINING PROGRAM

## 2023-09-03 PROCEDURE — 63600175 PHARM REV CODE 636 W HCPCS

## 2023-09-03 PROCEDURE — 27000221 HC OXYGEN, UP TO 24 HOURS

## 2023-09-03 PROCEDURE — 20000000 HC ICU ROOM

## 2023-09-03 PROCEDURE — 85025 COMPLETE CBC W/AUTO DIFF WBC: CPT

## 2023-09-03 PROCEDURE — 63600175 PHARM REV CODE 636 W HCPCS: Performed by: INTERNAL MEDICINE

## 2023-09-03 PROCEDURE — 84100 ASSAY OF PHOSPHORUS: CPT

## 2023-09-03 PROCEDURE — 25000003 PHARM REV CODE 250: Performed by: INTERNAL MEDICINE

## 2023-09-03 PROCEDURE — 80053 COMPREHEN METABOLIC PANEL: CPT

## 2023-09-03 PROCEDURE — A4216 STERILE WATER/SALINE, 10 ML: HCPCS

## 2023-09-03 PROCEDURE — 94761 N-INVAS EAR/PLS OXIMETRY MLT: CPT

## 2023-09-03 PROCEDURE — 63600175 PHARM REV CODE 636 W HCPCS: Performed by: STUDENT IN AN ORGANIZED HEALTH CARE EDUCATION/TRAINING PROGRAM

## 2023-09-03 RX ORDER — POTASSIUM CHLORIDE 7.45 MG/ML
10 INJECTION INTRAVENOUS
Status: DISCONTINUED | OUTPATIENT
Start: 2023-09-03 | End: 2023-09-03

## 2023-09-03 RX ORDER — POTASSIUM CHLORIDE 14.9 MG/ML
40 INJECTION INTRAVENOUS ONCE
Status: COMPLETED | OUTPATIENT
Start: 2023-09-03 | End: 2023-09-03

## 2023-09-03 RX ORDER — MAGNESIUM SULFATE 1 G/100ML
1 INJECTION INTRAVENOUS ONCE
Status: COMPLETED | OUTPATIENT
Start: 2023-09-03 | End: 2023-09-03

## 2023-09-03 RX ORDER — MIDAZOLAM HYDROCHLORIDE 1 MG/ML
INJECTION INTRAMUSCULAR; INTRAVENOUS
Status: COMPLETED
Start: 2023-09-03 | End: 2023-09-03

## 2023-09-03 RX ADMIN — SODIUM CHLORIDE, PRESERVATIVE FREE 10 ML: 5 INJECTION INTRAVENOUS at 06:09

## 2023-09-03 RX ADMIN — LEVETIRACETAM 500 MG: 100 INJECTION, SOLUTION INTRAVENOUS at 09:09

## 2023-09-03 RX ADMIN — MIDAZOLAM HYDROCHLORIDE 2 MG: 1 INJECTION, SOLUTION INTRAMUSCULAR; INTRAVENOUS at 05:09

## 2023-09-03 RX ADMIN — POTASSIUM CHLORIDE 40 MEQ: 14.9 INJECTION, SOLUTION INTRAVENOUS at 08:09

## 2023-09-03 RX ADMIN — POTASSIUM PHOSPHATE, MONOBASIC AND POTASSIUM PHOSPHATE, DIBASIC 20 MMOL: 224; 236 INJECTION, SOLUTION, CONCENTRATE INTRAVENOUS at 06:09

## 2023-09-03 RX ADMIN — DEXMEDETOMIDINE HYDROCHLORIDE 0.8 MCG/KG/HR: 400 INJECTION INTRAVENOUS at 10:09

## 2023-09-03 RX ADMIN — DEXMEDETOMIDINE HYDROCHLORIDE 0.8 MCG/KG/HR: 400 INJECTION INTRAVENOUS at 05:09

## 2023-09-03 RX ADMIN — MAGNESIUM SULFATE IN DEXTROSE 1 G: 10 INJECTION, SOLUTION INTRAVENOUS at 05:09

## 2023-09-03 RX ADMIN — SODIUM CHLORIDE, PRESERVATIVE FREE 10 ML: 5 INJECTION INTRAVENOUS at 12:09

## 2023-09-03 RX ADMIN — MUPIROCIN: 20 OINTMENT TOPICAL at 09:09

## 2023-09-03 RX ADMIN — LEVETIRACETAM 500 MG: 100 INJECTION, SOLUTION INTRAVENOUS at 08:09

## 2023-09-03 RX ADMIN — MUPIROCIN: 20 OINTMENT TOPICAL at 08:09

## 2023-09-03 RX ADMIN — SODIUM CHLORIDE, PRESERVATIVE FREE 10 ML: 5 INJECTION INTRAVENOUS at 05:09

## 2023-09-03 NOTE — ANESTHESIA POSTPROCEDURE EVALUATION
Anesthesia Post Evaluation    Patient: Bozena Nikita    Procedure(s) Performed: * No procedures listed *    Final Anesthesia Type: general      Patient location during evaluation: PACU  Patient participation: Yes- Able to Participate  Level of consciousness: awake and alert  Post-procedure vital signs: reviewed and stable  Pain management: adequate  Airway patency: patent      Anesthetic complications: no      Cardiovascular status: hemodynamically stable  Respiratory status: unassisted  Hydration status: euvolemic  Follow-up not needed.          Vitals Value Taken Time   /74 09/03/23 1817   Temp 37.4 °C (99.4 °F) 09/03/23 1600   Pulse 56 09/03/23 1821   Resp 15 09/03/23 1821   SpO2 94 % 09/03/23 1821   Vitals shown include unvalidated device data.      No case tracking events are documented in the log.      Pain/Molly Score: Pain Rating Prior to Med Admin: 0 (9/3/2023  7:31 AM)  Pain Rating Post Med Admin: 2 (9/3/2023  8:01 AM)

## 2023-09-03 NOTE — PROGRESS NOTES
Inpatient Nutrition Assessment    Admit Date: 8/30/2023   Total duration of encounter: 4 days     Nutrition Recommendation/Prescription     Start tube feeding when appropriate.  Tube feeding recommendation:   Isosource HN @ 75ml/hr (goal rate, based on tube feeding running ~20 hours/day)  Provides:  1800kcal (104% est needs)  83gm protein (111% est needs)  1215ml free water (70% est needs)    Will need additional ~500ml free water via IV fluids or water flushes. Can be given as 50ml q2hr.     Communication of Recommendations: reviewed with nurse    Nutrition Assessment     Malnutrition Assessment/Nutrition-Focused Physical Exam    Malnutrition Context: chronic illness  Malnutrition Level: severe  Energy Intake (Malnutrition):  (unable to eval)  Weight Loss (Malnutrition):  (unable to eval)  Subcutaneous Fat (Malnutrition): severe depletion  Orbital Region (Subcutaneous Fat Loss): severe depletion  Upper Arm Region (Subcutaneous Fat Loss): severe depletion     Muscle Mass (Malnutrition): severe depletion  [place] Region (Muscle Loss): severe depletion  Clavicle Bone Region (Muscle Loss): severe depletion  Clavicle and Acromion Bone Region (Muscle Loss): severe depletion                 Fluid Accumulation (Malnutrition):  (does not meet criteria)        A minimum of two characteristics is recommended for diagnosis of either severe or non-severe malnutrition.    Chart Review    Reason Seen: continuous nutrition monitoring and physician consult for assess dietary needs    Diagnosis:  Left-sided hemorrhagic CVA     Relevant Medical History: no known hx    Nutrition-Related Medications: noted  Calorie Containing IV Medications: no significant kcals from medications at this time    Nutrition-Related Labs:  9/1 K 3.2, Glu 121, Phos 2.2  9/3 Na 147, Cl 114    Diet/PN Order: Diet NPO  Oral Supplement Order: none  Tube Feeding Order: none  Appetite/Oral Intake: not applicable/not applicable  Factors Affecting Nutritional  "Intake: NPO  Food/Adventism/Cultural Preferences: unable to obtain  Food Allergies: none reported       Wound(s):   incision noted    Comments    23: Discussed with RN. Will provide tube feeding recommendations for when appropriate to start tube feeding. Receiving kcal from meds. Possible plans for extubation post procedure today. Will likely place NG prior to extubation per RN.     9/3/23: Pt now extubated. Plans for starting TF per RN. NG to be placed. Pt not able to verify subjective info due to mental status.     Anthropometrics    Height: 5' 5.98" (167.6 cm)    Last Weight: 50 kg (110 lb 3.7 oz) (23)    BMI (Calculated): 17.8  BMI Classification: underweight (BMI less than 18.5)     Ideal Body Weight (IBW), Female: 129.9 lb     % Ideal Body Weight, Female (lb): 84.79 %                             Usual Weight Provided By: unable to obtain usual weight    Wt Readings from Last 5 Encounters:   23 50 kg (110 lb 3.7 oz)     Weight Change(s) Since Admission:  Admit Weight: 50 kg (110 lb 3.7 oz) (23)    Estimated Needs    Weight Used For Calorie Calculations: 50 kg (110 lb 3.7 oz)  Energy Calorie Requirements (kcal): 1730kcal (1.2 stress factor +500kcal)  Energy Need Method: [place]-St Jeor  Weight Used For Protein Calculations: 50 kg (110 lb 3.7 oz)  Protein Requirements: 75gm (1.5g/kg)  Fluid Requirements (mL): 1730ml (1ml/kcal)  Temp (24hrs), Av.3 °F (37.4 °C), Min:99.1 °F (37.3 °C), Max:99.4 °F (37.4 °C)    Vtot (L/Min) for Jaime State Equation Calculation: 7.1    Enteral Nutrition    Patient not receiving enteral nutrition at this time.    Parenteral Nutrition    Patient not receiving parenteral nutrition support at this time.    Evaluation of Received Nutrient Intake    Calories: not meeting estimated needs  Protein: not meeting estimated needs    Patient Education    Not applicable.    Nutrition Diagnosis     PES: Malnutrition related to chronic illness as evidenced by muscle " wasting, fat loss. (continues)    Interventions/Goals     Intervention(s): modified composition of enteral nutrition, modified rate of enteral nutrition, and collaboration with other providers  Goal: Meet greater than 75% of nutritional needs by follow-up. (goal progressing)    Monitoring & Evaluation     Dietitian will monitor energy intake.  Nutrition Risk/Follow-Up: high (follow-up in 1-4 days)   Please consult if re-assessment needed sooner.

## 2023-09-03 NOTE — PROGRESS NOTES
[facility] - Emergency Dept  Pulmonary Critical Care Note    Patient Name: Bozena Shelton  MRN: [id number]  Admission Date: 8/30/2023  Hospital Length of Stay: 4 days  Code Status: Full Code  Attending Provider: Gokul Mejia MD  Primary Care Provider: Shannan, Primary Doctor     Subjective:     HPI:   Patient is a 73-year-old female with no past medical history on file who presented to the ED on 08/30 via EMS after sustaining a fall at home.  Per EMS, patient fell off the side of the bed but was caught by her .  Denied loss of consciousness and head trauma.  EMS reported that the patient was confused on arrival and having slurred speech with right-sided facial droop.  Last known normal time was around 2100 today.  Patient's family was not present in the ED during my evaluation of the patient and the patient was not able to contribute to history.    In the ED, CT head was conducted which displayed evidence of left hemorrhagic CVA.  Neurosurgery was consulted who recommended blood pressure control with systolic blood pressure less than 140 and repeat head CT in 6 hours.  Keppra for seizure prophylaxis.  Patient was admitted to the ICU for further care and management.    Hospital Course/Significant events:  8/30/23: admit to ICU for L hemorrhagic CVA  8/31/23: underwent diagnostic cerebral angiogram with interventional neurology    24 Hour Interval History:  Unsuccessful embolization of the left middle meningeal artery arteriovenous fistula due to tortuosity 9/1/23 with Interventional neurology.  Patient is extubated after procedure.  Patient moves extremities but does not follow commands likely from the stroke.  Neuro checks q.4 hours.  Due to agitation patient is on low-dose Precedex, will wean as tolerated and can be likely downgraded if neurosurgery amenable.    History reviewed. No pertinent past medical history.    History reviewed. No pertinent surgical history.  Social History      Socioeconomic History    Marital status:      No current outpatient medications    Current Inpatient Medications   levETIRAcetam (Keppra) IV (PEDS and ADULTS)  500 mg Intravenous Q12H    mupirocin   Nasal BID    sodium chloride 0.9%  10 mL Intravenous Q6H       Current Intravenous Infusions   clevidipine 4 mg/hr (09/02/23 0832)    dexmedeTOMIDine (Precedex) infusion (titrating) 1 mcg/kg/hr (09/02/23 1400)    fentanyl 25 mcg/hr (09/01/23 1845)     Review of system:  Unable to obtain due to patient's condition   Objective:       Intake/Output Summary (Last 24 hours) at 9/3/2023 0418  Last data filed at 9/2/2023 1400  Gross per 24 hour   Intake 794.43 ml   Output 1475 ml   Net -680.57 ml           Vital Signs (Most Recent):  Temp: 99.1 °F (37.3 °C) (09/02/23 1600)  Pulse: (!) 56 (09/02/23 1830)  Resp: 10 (09/02/23 1830)  BP: 96/64 (09/02/23 1800)  SpO2: 96 % (09/02/23 1830)  Body mass index is 17.8 kg/m².  Weight: 50 kg (110 lb 3.7 oz) Vital Signs (24h Range):  Temp:  [99 °F (37.2 °C)-99.4 °F (37.4 °C)] 99.1 °F (37.3 °C)  Pulse:  [] 56  Resp:  [10-26] 10  SpO2:  [95 %-100 %] 96 %  BP: ()/(55-88) 96/64  Arterial Line BP: ()/() 105/95     Physical Exam  Vitals and nursing note reviewed.   Constitutional:       Comments: On 3 L nasal cannula, mildly sedated   HENT:      Head: Normocephalic and atraumatic.   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  Left pupil is approximately 3 mm in diameter.   Cardiovascular:      Rate and Rhythm: Normal rate and regular rhythm.      Pulses: Normal pulses.      Heart sounds: Normal heart sounds.   Pulmonary:      Effort: Pulmonary effort is normal.      Breath sounds: Normal breath sounds.   Abdominal:      General: There is no distension.      Palpations: Abdomen is soft.   Musculoskeletal:         General: Signs of injury present. No swelling.   Skin:     General: Skin is warm and  dry.   Neurological:      Comments: Moving extremities bilaterally, opening eyes, does not follow commands at this time   Tense not answer to questions  Mildly sedated with Precedex  Reflexes intact at this time       Lines/Drains/Airways       Peripherally Inserted Central Catheter Line  Duration             PICC Triple Lumen 08/31/23 2112 right brachial 2 days              Drain  Duration                  Urethral Catheter 08/31/23 0130 Temperature probe 16 Fr. 3 days              Peripheral Intravenous Line  Duration                  Peripheral IV - Single Lumen 08/31/23 1401 20 G Right Antecubital 2 days                    Significant Labs:    Lab Results   Component Value Date    WBC 10.56 09/03/2023    HGB 13.6 09/03/2023    HCT 39.6 09/03/2023    MCV 95.0 (H) 09/03/2023     09/03/2023     BMP  Lab Results   Component Value Date     (H) 09/03/2023    K 3.5 09/03/2023    CHLORIDE 114 (H) 09/03/2023    CO2 21 (L) 09/03/2023    BUN 9.1 (L) 09/03/2023    CREATININE 0.50 (L) 09/03/2023    CALCIUM 8.7 09/03/2023    AGAP 8.0 09/02/2023     ABG  Recent Labs   Lab 09/02/23 0945   PH 7.430   PO2 71.0*   PCO2 31.0*   HCO3 20.6   POCBASEDEF -2.80         Vent Mode: A/C (09/02/23 0810)  Ventilator Initiated: Yes (08/31/23 1310)  Set Rate: 20 BPM (09/02/23 0810)  Vt Set: 450 mL (09/02/23 0810)  Pressure Support: 10 cmH20 (09/01/23 1542)  PEEP/CPAP: 5 cmH20 (09/02/23 0810)  Oxygen Concentration (%): 40 (09/02/23 0810)  Peak Airway Pressure: 20 cmH20 (09/02/23 0810)  Total Ve: 7.4 L/m (09/02/23 0810)  F/VT Ratio<105 (RSBI): (!) 54.35 (09/02/23 0810)  Significant Imaging:  I have reviewed the pertinent imaging within the past 24 hours.  Assessment/Plan:     Assessment  Left-sided hemorrhagic CVA as demonstrated on CT head with no evidence of midline shift  Intubated 8/31 for procedures with interventional neurology     Plan  1. Patient admitted to ICU for further care and management   2.Need to maintain SBP goal  of <140; off of cleviprex ggt at this time   3. Keppra for seizure prophylaxis 500 b.i.d.  4. Q4hr neuro checks, hold antiplatelets/anticoagulation  5. Interventional Neurology unsuccessful for embolization of left MMA AV fistula on 9/1/23  6. Patient extubated yesterday; on Precedex due to agitation; will wean as tolerated; will likely be able to downgrade today if amenable with neurosurgery  7. Monitor electrolytes; repleting as needed      45 minutes of critical care was time spent personally by me on the following activities: development of treatment plan with patient or surrogate and bedside caregivers, discussions with consultants, evaluation of patient's response to treatment, examination of patient, ordering and performing treatments and interventions, ordering and review of laboratory studies, ordering and review of radiographic studies, pulse oximetry, re-evaluation of patient's condition.  This critical care time did not overlap with that of any other provider or involve time for any procedures.     Chriss Becerra,   Pulmonary Critical Care Medicine  [facility] - Emergency Dept

## 2023-09-04 LAB
ALBUMIN SERPL-MCNC: 3.2 G/DL (ref 3.4–4.8)
ALBUMIN/GLOB SERPL: 1.2 RATIO (ref 1.1–2)
ALP SERPL-CCNC: 64 UNIT/L (ref 40–150)
ALT SERPL-CCNC: 28 UNIT/L (ref 0–55)
AST SERPL-CCNC: 40 UNIT/L (ref 5–34)
BASOPHILS # BLD AUTO: 0.04 X10(3)/MCL
BASOPHILS NFR BLD AUTO: 0.4 %
BILIRUB SERPL-MCNC: 1.2 MG/DL
BUN SERPL-MCNC: 10.1 MG/DL (ref 9.8–20.1)
CALCIUM SERPL-MCNC: 8.4 MG/DL (ref 8.4–10.2)
CHLORIDE SERPL-SCNC: 108 MMOL/L (ref 98–107)
CO2 SERPL-SCNC: 22 MMOL/L (ref 23–31)
CREAT SERPL-MCNC: 0.44 MG/DL (ref 0.55–1.02)
EOSINOPHIL # BLD AUTO: 0.05 X10(3)/MCL (ref 0–0.9)
EOSINOPHIL NFR BLD AUTO: 0.5 %
ERYTHROCYTE [DISTWIDTH] IN BLOOD BY AUTOMATED COUNT: 13.4 % (ref 11.5–17)
GFR SERPLBLD CREATININE-BSD FMLA CKD-EPI: >60 MLS/MIN/1.73/M2
GLOBULIN SER-MCNC: 2.7 GM/DL (ref 2.4–3.5)
GLUCOSE SERPL-MCNC: 107 MG/DL (ref 82–115)
HCT VFR BLD AUTO: 40.5 % (ref 37–47)
HGB BLD-MCNC: 14.1 G/DL (ref 12–16)
IMM GRANULOCYTES # BLD AUTO: 0.04 X10(3)/MCL (ref 0–0.04)
IMM GRANULOCYTES NFR BLD AUTO: 0.4 %
LYMPHOCYTES # BLD AUTO: 0.72 X10(3)/MCL (ref 0.6–4.6)
LYMPHOCYTES NFR BLD AUTO: 7.1 %
MAGNESIUM SERPL-MCNC: 1.8 MG/DL (ref 1.6–2.6)
MCH RBC QN AUTO: 32.6 PG (ref 27–31)
MCHC RBC AUTO-ENTMCNC: 34.8 G/DL (ref 33–36)
MCV RBC AUTO: 93.8 FL (ref 80–94)
MONOCYTES # BLD AUTO: 0.56 X10(3)/MCL (ref 0.1–1.3)
MONOCYTES NFR BLD AUTO: 5.5 %
NEUTROPHILS # BLD AUTO: 8.8 X10(3)/MCL (ref 2.1–9.2)
NEUTROPHILS NFR BLD AUTO: 86.1 %
NRBC BLD AUTO-RTO: 0 %
PHOSPHATE SERPL-MCNC: 1.9 MG/DL (ref 2.3–4.7)
PLATELET # BLD AUTO: 137 X10(3)/MCL (ref 130–400)
PMV BLD AUTO: 10.3 FL (ref 7.4–10.4)
POTASSIUM SERPL-SCNC: 3.5 MMOL/L (ref 3.5–5.1)
PROT SERPL-MCNC: 5.9 GM/DL (ref 5.8–7.6)
RBC # BLD AUTO: 4.32 X10(6)/MCL (ref 4.2–5.4)
SODIUM SERPL-SCNC: 141 MMOL/L (ref 136–145)
WBC # SPEC AUTO: 10.21 X10(3)/MCL (ref 4.5–11.5)

## 2023-09-04 PROCEDURE — 94761 N-INVAS EAR/PLS OXIMETRY MLT: CPT

## 2023-09-04 PROCEDURE — A4216 STERILE WATER/SALINE, 10 ML: HCPCS

## 2023-09-04 PROCEDURE — 80053 COMPREHEN METABOLIC PANEL: CPT

## 2023-09-04 PROCEDURE — 84100 ASSAY OF PHOSPHORUS: CPT

## 2023-09-04 PROCEDURE — 25000003 PHARM REV CODE 250: Performed by: INTERNAL MEDICINE

## 2023-09-04 PROCEDURE — 63600175 PHARM REV CODE 636 W HCPCS

## 2023-09-04 PROCEDURE — 25000003 PHARM REV CODE 250

## 2023-09-04 PROCEDURE — 25000003 PHARM REV CODE 250: Performed by: STUDENT IN AN ORGANIZED HEALTH CARE EDUCATION/TRAINING PROGRAM

## 2023-09-04 PROCEDURE — 20000000 HC ICU ROOM

## 2023-09-04 PROCEDURE — 85025 COMPLETE CBC W/AUTO DIFF WBC: CPT

## 2023-09-04 PROCEDURE — 99232 PR SUBSEQUENT HOSPITAL CARE,LEVL II: ICD-10-PCS | Mod: ,,, | Performed by: PSYCHIATRY & NEUROLOGY

## 2023-09-04 PROCEDURE — 27000221 HC OXYGEN, UP TO 24 HOURS

## 2023-09-04 PROCEDURE — 99232 SBSQ HOSP IP/OBS MODERATE 35: CPT | Mod: ,,, | Performed by: PSYCHIATRY & NEUROLOGY

## 2023-09-04 PROCEDURE — 83735 ASSAY OF MAGNESIUM: CPT

## 2023-09-04 RX ORDER — QUETIAPINE FUMARATE 25 MG/1
50 TABLET, FILM COATED ORAL ONCE
Status: COMPLETED | OUTPATIENT
Start: 2023-09-04 | End: 2023-09-04

## 2023-09-04 RX ORDER — QUETIAPINE FUMARATE 50 MG/1
50 TABLET, EXTENDED RELEASE ORAL DAILY
Status: DISCONTINUED | OUTPATIENT
Start: 2023-09-04 | End: 2023-09-04

## 2023-09-04 RX ADMIN — LEVETIRACETAM 500 MG: 100 INJECTION, SOLUTION INTRAVENOUS at 08:09

## 2023-09-04 RX ADMIN — DEXMEDETOMIDINE HYDROCHLORIDE 0.8 MCG/KG/HR: 400 INJECTION INTRAVENOUS at 08:09

## 2023-09-04 RX ADMIN — MUPIROCIN: 20 OINTMENT TOPICAL at 08:09

## 2023-09-04 RX ADMIN — SODIUM CHLORIDE, PRESERVATIVE FREE 10 ML: 5 INJECTION INTRAVENOUS at 06:09

## 2023-09-04 RX ADMIN — QUETIAPINE FUMARATE 50 MG: 25 TABLET ORAL at 09:09

## 2023-09-04 RX ADMIN — POTASSIUM PHOSPHATE, MONOBASIC AND POTASSIUM PHOSPHATE, DIBASIC 20 MMOL: 224; 236 INJECTION, SOLUTION, CONCENTRATE INTRAVENOUS at 08:09

## 2023-09-04 RX ADMIN — SODIUM CHLORIDE, PRESERVATIVE FREE 10 ML: 5 INJECTION INTRAVENOUS at 12:09

## 2023-09-04 NOTE — PROGRESS NOTES
[facility] - Emergency Dept  Pulmonary Critical Care Note    Patient Name: Bozena Shelton  MRN: [id number]  Admission Date: 8/30/2023  Hospital Length of Stay: 5 days  Code Status: Full Code  Attending Provider: Gokul Mejia MD  Primary Care Provider: Shannan, Primary Doctor     Subjective:     HPI:   Patient is a 73-year-old female with no past medical history on file who presented to the ED on 08/30 via EMS after sustaining a fall at home.  Per EMS, patient fell off the side of the bed but was caught by her .  Denied loss of consciousness and head trauma.  EMS reported that the patient was confused on arrival and having slurred speech with right-sided facial droop.  Last known normal time was around 2100 today.  Patient's family was not present in the ED during my evaluation of the patient and the patient was not able to contribute to history.    In the ED, CT head was conducted which displayed evidence of left hemorrhagic CVA.  Neurosurgery was consulted who recommended blood pressure control with systolic blood pressure less than 140 and repeat head CT in 6 hours.  Keppra for seizure prophylaxis.  Patient was admitted to the ICU for further care and management.    Hospital Course/Significant events:  8/30/23: admit to ICU for L hemorrhagic CVA  8/31/23: underwent diagnostic cerebral angiogram with interventional neurology    24 Hour Interval History:  Patient extubated on 09/03/2023.  Very agitated and thus has been on Precedex 0.8.  Will wean off of Precedex today and add Seroquel 50 at this time for agitation.  Otherwise no acute events overnight.  No reported fever overnight.  Will likely be able to downgrade today.    History reviewed. No pertinent past medical history.    History reviewed. No pertinent surgical history.  Social History     Socioeconomic History    Marital status:      No current outpatient medications    Current Inpatient Medications   levETIRAcetam (Keppra) IV  (PEDS and ADULTS)  500 mg Intravenous Q12H    mupirocin   Nasal BID    potassium phosphate IVPB  20 mmol Intravenous Once    sodium chloride 0.9%  10 mL Intravenous Q6H       Current Intravenous Infusions   clevidipine 0 mg/hr (09/02/23 1920)    dexmedeTOMIDine (Precedex) infusion (titrating) 0.8 mcg/kg/hr (09/03/23 1735)     Review of system:  Unable to obtain due to patient's condition   Objective:     No intake or output data in the 24 hours ending 09/04/23 0733        Vital Signs (Most Recent):  Temp: 98.4 °F (36.9 °C) (09/04/23 0400)  Pulse: 60 (09/04/23 0600)  Resp: 10 (09/04/23 0600)  BP: (!) 154/88 (09/04/23 0600)  SpO2: 98 % (09/04/23 0600)  Body mass index is 17.8 kg/m².  Weight: 50 kg (110 lb 3.7 oz) Vital Signs (24h Range):  Temp:  [98.4 °F (36.9 °C)-99.7 °F (37.6 °C)] 98.4 °F (36.9 °C)  Pulse:  [] 60  Resp:  [8-33] 10  SpO2:  [93 %-100 %] 98 %  BP: (117-154)/() 154/88     Physical Exam  Vitals and nursing note reviewed.   Constitutional:       Comments: On 3 L nasal cannula, sedated on Precedex, gets agitated intermittently.   HENT:      Head: Normocephalic and atraumatic.   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  Left pupil is approximately 3 mm in diameter.   Cardiovascular:      Rate and Rhythm: Normal rate and regular rhythm.      Pulses: Normal pulses.      Heart sounds: Normal heart sounds.   Pulmonary:      Effort: Pulmonary effort is normal.      Breath sounds: Normal breath sounds.   Abdominal:      General: There is no distension.      Palpations: Abdomen is soft.   Musculoskeletal:         General: Signs of injury present. No swelling.   Skin:     General: Skin is warm and dry.   Neurological:      Comments: Sedated on Precedex at this time   Moving extremities otherwise and gets very agitated when not on sedation patient does not follow commands or does not answer to the questions       Lines/Drains/Airways        Peripherally Inserted Central Catheter Line  Duration             PICC Triple Lumen 08/31/23 2112 right brachial 3 days              Drain  Duration                  NG/OG Tube 09/03/23 1752 nasogastric 18 Fr. Right nostril <1 day              Peripheral Intravenous Line  Duration                  Peripheral IV - Single Lumen 08/31/23 1401 20 G Right Antecubital 3 days                    Significant Labs:    Lab Results   Component Value Date    WBC 10.21 09/04/2023    HGB 14.1 09/04/2023    HCT 40.5 09/04/2023    MCV 93.8 09/04/2023     09/04/2023     BMP  Lab Results   Component Value Date     09/04/2023    K 3.5 09/04/2023    CHLORIDE 108 (H) 09/04/2023    CO2 22 (L) 09/04/2023    BUN 10.1 09/04/2023    CREATININE 0.44 (L) 09/04/2023    CALCIUM 8.4 09/04/2023    AGAP 8.0 09/02/2023     ABG  Recent Labs   Lab 09/02/23 0945   PH 7.430   PO2 71.0*   PCO2 31.0*   HCO3 20.6   POCBASEDEF -2.80         Vent Mode: A/C (09/02/23 0810)  Ventilator Initiated: Yes (08/31/23 1310)  Set Rate: 20 BPM (09/02/23 0810)  Vt Set: 450 mL (09/02/23 0810)  Pressure Support: 10 cmH20 (09/01/23 1542)  PEEP/CPAP: 5 cmH20 (09/02/23 0810)  Oxygen Concentration (%): 40 (09/02/23 0810)  Peak Airway Pressure: 20 cmH20 (09/02/23 0810)  Total Ve: 7.4 L/m (09/02/23 0810)  F/VT Ratio<105 (RSBI): (!) 54.35 (09/02/23 0810)  Significant Imaging:  I have reviewed the pertinent imaging within the past 24 hours.  Assessment/Plan:     Assessment  Left-sided hemorrhagic CVA as demonstrated on CT head with no evidence of midline shift  Intubated 8/31 for procedures with interventional neurology- extubated on 9/2/23    Plan  1. Patient admitted to ICU for further care and management   3. Keppra for seizure prophylaxis 500 b.i.d.  4. Q4hr neuro checks, hold antiplatelets/anticoagulation  5. Interventional Neurology unsuccessful for embolization of left MMA AV fistula on 9/1/23; plan for outpatient intervention in 2-3 weeks  6. On Precedex  due to agitation this a.m.; started on Seroquel 50 today, will try to wean off of Precedex today and likely to be able to downgrade today  7. Monitor electrolytes; repleting as needed      45 minutes of critical care was time spent personally by me on the following activities: development of treatment plan with patient or surrogate and bedside caregivers, discussions with consultants, evaluation of patient's response to treatment, examination of patient, ordering and performing treatments and interventions, ordering and review of laboratory studies, ordering and review of radiographic studies, pulse oximetry, re-evaluation of patient's condition.  This critical care time did not overlap with that of any other provider or involve time for any procedures.     Chriss Becerra,   Pulmonary Critical Care Medicine  [facility] - Emergency Dept

## 2023-09-04 NOTE — PLAN OF CARE
Problem: Adult Inpatient Plan of Care  Goal: Absence of Hospital-Acquired Illness or Injury  Outcome: Ongoing, Progressing     Problem: Infection  Goal: Absence of Infection Signs and Symptoms  Outcome: Ongoing, Progressing     Problem: Skin Injury Risk Increased  Goal: Skin Health and Integrity  Outcome: Ongoing, Progressing     Problem: Restraint, Nonbehavioral (Nonviolent)  Goal: Absence of Harm or Injury  Outcome: Ongoing, Progressing

## 2023-09-05 LAB
ALBUMIN SERPL-MCNC: 3.2 G/DL (ref 3.4–4.8)
ALBUMIN/GLOB SERPL: 1.1 RATIO (ref 1.1–2)
ALP SERPL-CCNC: 77 UNIT/L (ref 40–150)
ALT SERPL-CCNC: 32 UNIT/L (ref 0–55)
AST SERPL-CCNC: 34 UNIT/L (ref 5–34)
BASOPHILS # BLD AUTO: 0.03 X10(3)/MCL
BASOPHILS NFR BLD AUTO: 0.3 %
BILIRUB SERPL-MCNC: 1 MG/DL
BUN SERPL-MCNC: 11.9 MG/DL (ref 9.8–20.1)
CALCIUM SERPL-MCNC: 8.7 MG/DL (ref 8.4–10.2)
CHLORIDE SERPL-SCNC: 101 MMOL/L (ref 98–107)
CO2 SERPL-SCNC: 27 MMOL/L (ref 23–31)
CREAT SERPL-MCNC: 0.52 MG/DL (ref 0.55–1.02)
EOSINOPHIL # BLD AUTO: 0.01 X10(3)/MCL (ref 0–0.9)
EOSINOPHIL NFR BLD AUTO: 0.1 %
ERYTHROCYTE [DISTWIDTH] IN BLOOD BY AUTOMATED COUNT: 13.2 % (ref 11.5–17)
GFR SERPLBLD CREATININE-BSD FMLA CKD-EPI: >60 MLS/MIN/1.73/M2
GLOBULIN SER-MCNC: 2.9 GM/DL (ref 2.4–3.5)
GLUCOSE SERPL-MCNC: 134 MG/DL (ref 82–115)
HCT VFR BLD AUTO: 43.3 % (ref 37–47)
HGB BLD-MCNC: 14.9 G/DL (ref 12–16)
IMM GRANULOCYTES # BLD AUTO: 0.04 X10(3)/MCL (ref 0–0.04)
IMM GRANULOCYTES NFR BLD AUTO: 0.3 %
LYMPHOCYTES # BLD AUTO: 0.66 X10(3)/MCL (ref 0.6–4.6)
LYMPHOCYTES NFR BLD AUTO: 5.5 %
MAGNESIUM SERPL-MCNC: 1.6 MG/DL (ref 1.6–2.6)
MCH RBC QN AUTO: 31.7 PG (ref 27–31)
MCHC RBC AUTO-ENTMCNC: 34.4 G/DL (ref 33–36)
MCV RBC AUTO: 92.1 FL (ref 80–94)
MONOCYTES # BLD AUTO: 0.93 X10(3)/MCL (ref 0.1–1.3)
MONOCYTES NFR BLD AUTO: 7.8 %
NEUTROPHILS # BLD AUTO: 10.29 X10(3)/MCL (ref 2.1–9.2)
NEUTROPHILS NFR BLD AUTO: 86 %
NRBC BLD AUTO-RTO: 0 %
PHOSPHATE SERPL-MCNC: 1.9 MG/DL (ref 2.3–4.7)
PLATELET # BLD AUTO: 153 X10(3)/MCL (ref 130–400)
PMV BLD AUTO: 10.3 FL (ref 7.4–10.4)
POTASSIUM SERPL-SCNC: 3.5 MMOL/L (ref 3.5–5.1)
PROT SERPL-MCNC: 6.1 GM/DL (ref 5.8–7.6)
RBC # BLD AUTO: 4.7 X10(6)/MCL (ref 4.2–5.4)
SODIUM SERPL-SCNC: 138 MMOL/L (ref 136–145)
WBC # SPEC AUTO: 11.96 X10(3)/MCL (ref 4.5–11.5)

## 2023-09-05 PROCEDURE — 97162 PT EVAL MOD COMPLEX 30 MIN: CPT

## 2023-09-05 PROCEDURE — 83735 ASSAY OF MAGNESIUM: CPT

## 2023-09-05 PROCEDURE — 25000003 PHARM REV CODE 250

## 2023-09-05 PROCEDURE — 27000221 HC OXYGEN, UP TO 24 HOURS

## 2023-09-05 PROCEDURE — 85025 COMPLETE CBC W/AUTO DIFF WBC: CPT

## 2023-09-05 PROCEDURE — 99232 PR SUBSEQUENT HOSPITAL CARE,LEVL II: ICD-10-PCS | Mod: ,,, | Performed by: PSYCHIATRY & NEUROLOGY

## 2023-09-05 PROCEDURE — 63600175 PHARM REV CODE 636 W HCPCS

## 2023-09-05 PROCEDURE — 97167 OT EVAL HIGH COMPLEX 60 MIN: CPT

## 2023-09-05 PROCEDURE — 84100 ASSAY OF PHOSPHORUS: CPT

## 2023-09-05 PROCEDURE — 25000003 PHARM REV CODE 250: Performed by: INTERNAL MEDICINE

## 2023-09-05 PROCEDURE — 20000000 HC ICU ROOM

## 2023-09-05 PROCEDURE — 94761 N-INVAS EAR/PLS OXIMETRY MLT: CPT

## 2023-09-05 PROCEDURE — 99232 SBSQ HOSP IP/OBS MODERATE 35: CPT | Mod: ,,, | Performed by: PSYCHIATRY & NEUROLOGY

## 2023-09-05 PROCEDURE — 80053 COMPREHEN METABOLIC PANEL: CPT

## 2023-09-05 PROCEDURE — 99900035 HC TECH TIME PER 15 MIN (STAT)

## 2023-09-05 PROCEDURE — 25000003 PHARM REV CODE 250: Performed by: STUDENT IN AN ORGANIZED HEALTH CARE EDUCATION/TRAINING PROGRAM

## 2023-09-05 PROCEDURE — 21400001 HC TELEMETRY ROOM

## 2023-09-05 RX ORDER — LEVETIRACETAM 500 MG/1
500 TABLET ORAL 2 TIMES DAILY
Status: DISCONTINUED | OUTPATIENT
Start: 2023-09-05 | End: 2023-09-08

## 2023-09-05 RX ORDER — MAGNESIUM SULFATE 1 G/100ML
1 INJECTION INTRAVENOUS ONCE
Status: COMPLETED | OUTPATIENT
Start: 2023-09-05 | End: 2023-09-05

## 2023-09-05 RX ORDER — QUETIAPINE FUMARATE 25 MG/1
50 TABLET, FILM COATED ORAL 2 TIMES DAILY
Status: DISCONTINUED | OUTPATIENT
Start: 2023-09-05 | End: 2023-09-09

## 2023-09-05 RX ADMIN — LEVETIRACETAM 500 MG: 500 TABLET, FILM COATED ORAL at 08:09

## 2023-09-05 RX ADMIN — MAGNESIUM SULFATE IN DEXTROSE 1 G: 10 INJECTION, SOLUTION INTRAVENOUS at 04:09

## 2023-09-05 RX ADMIN — LABETALOL HYDROCHLORIDE 10 MG: 5 INJECTION, SOLUTION INTRAVENOUS at 04:09

## 2023-09-05 RX ADMIN — QUETIAPINE FUMARATE 50 MG: 25 TABLET ORAL at 08:09

## 2023-09-05 RX ADMIN — POTASSIUM PHOSPHATE, MONOBASIC AND POTASSIUM PHOSPHATE, DIBASIC 15 MMOL: 224; 236 INJECTION, SOLUTION, CONCENTRATE INTRAVENOUS at 06:09

## 2023-09-05 NOTE — PLAN OF CARE
Problem: Physical Therapy  Goal: Physical Therapy Goal  Description: Goals to be met by: 10/5/2023     Patient will increase functional independence with mobility by performin. Supine to sit with MInimal Assistance  2. Sit to stand transfer with Minimal Assistance  3. Bed to chair transfer with Minimal Assistance using LRAD  4. Gait  x 50 feet with Minimal Assistance using LRAD.  5. Pt will sit EOB for 5 min with SBA.      Outcome: Ongoing, Progressing

## 2023-09-05 NOTE — PLAN OF CARE
Problem: Occupational Therapy  Goal: Occupational Therapy Goal  Description: STG: to be met in 2 weeks  1. Pt will follow >80% of simple one step commands  2. Pt will perform grooming EOB with min A.   3. Pt will complete UB dressing with min A.  4. Pt will perform functional grasp/reach/release of items >80% of trials in prep for increased participation in ADL tasks.   5. Pt will perform sit<>stand with mod A x 2 in prep for ADL t/fs.  6. Pt will demo visual attention to right side >80% of time with min verbal cues.      Outcome: Ongoing, Progressing

## 2023-09-05 NOTE — PT/OT/SLP PROGRESS
SLP reconsult order received, chart reviewed, and nursing/family consulted.  Pt lethargic, restless and confused with hoarse vocal quality and reduced secretion management noted.  Attempted at PO intake unsafe at this time.  Pt's  was provided with verbal education regarding swallow function, safety and assessment.  Understanding was verbalized.  Will continue to follow and tx as appropriate.

## 2023-09-05 NOTE — PT/OT/SLP EVAL
Physical Therapy Evaluation    Patient Name:  Bozena Shelton   MRN:  [id number]    Recommendations:     Discharge Recommendations: nursing facility, skilled   Discharge Equipment Recommendations: to be determined by next level of care   Barriers to discharge: Impaired mobility and Ongoing medical needs    Assessment:     Bozena Shelton is a 73 y.o. female admitted with a medical diagnosis of fall, L hemorrhagic CVA, L temporal AVM, L MMA AV fistula, s/p unsuccessful attempt of embolization of L MMA.  She presents with the following impairments/functional limitations: weakness, impaired endurance, impaired self care skills, impaired functional mobility, gait instability, impaired balance, impaired cognition, decreased upper extremity function, decreased lower extremity function. Pt seen on RA and in roll belt with JOSE LUIS monsalve. Pt aphasic and not able to follow any formal command. Assisted pt to sitting EOB and pt demonstrated R and posterior lean. R sided inattention noted. RUE and RLE weakness present. RLE extensor tone also present, will order prafo boot to prevent contracture.     Rehab Prognosis: Fair; patient would benefit from acute skilled PT services to address these deficits and reach maximum level of function.    Recent Surgery: * No surgery found *      Plan:     During this hospitalization, patient to be seen 6 x/week to address the identified rehab impairments via gait training, therapeutic activities, therapeutic exercises, wheelchair management/training and progress toward the following goals:    Plan of Care Expires:  10/05/23    Subjective     Chief Complaint: CYN  Patient/Family Comments/goals: CYN  Pain/Comfort:  Pain Rating 1: 0/10    Patients cultural, spiritual, Orthodox conflicts given the current situation: no    Living Environment:  [facility]  Prior to admission, patients level of function was unknown, no family in room.  Equipment used at home: none.  Upon discharge, patient will have assistance from  unknown.    Objective:     Communicated with nurse prior to session.  Patient found supine with NG tube, restraints, blood pressure cuff, telemetry, pulse ox (continuous), peripheral IV, SCD  upon PT entry to room.    General Precautions: Standard, aspiration, -160  Orthopedic Precautions:    Braces: N/A  Respiratory Status: Room air  Blood Pressure: 107, 97%, 135/85      Exams:  Cognitive Exam:  Patient is oriented to n/a  RLE ROM: Deficits: impaired DF ROM, unable to achieve neutral  RLE Strength: 0/5, extensor tone  LLE ROM: WNL  LLE Strength: Deficits: 2/5 grossly  Skin integrity: Visible skin intact      Functional Mobility:  Bed Mobility:     Scooting: total assistance and of 2 persons  Supine to Sit: total assistance and of 2 persons  Sit to Supine: total assistance and of 2 persons  Transfers:     Sit to Stand:  attempted, but unable to achieve stand due to lack of following commands with hand-held assist  Balance: Max A for sitting balance, due to L lean and posterior      AM-PAC 6 CLICK MOBILITY  Total Score:8       Treatment & Education:    Patient provided with verbal education regarding PT POC.  Additional teaching is warranted.     Patient left supine with all lines intact, call button in reach, and nurse notified.    GOALS:   Multidisciplinary Problems       Physical Therapy Goals          Problem: Physical Therapy    Goal Priority Disciplines Outcome Goal Variances Interventions   Physical Therapy Goal     PT, PT/OT Ongoing, Progressing     Description: Goals to be met by: 10/5/2023     Patient will increase functional independence with mobility by performin. Supine to sit with MInimal Assistance  2. Sit to stand transfer with Minimal Assistance  3. Bed to chair transfer with Minimal Assistance using LRAD  4. Gait  x 50 feet with Minimal Assistance using LRAD.  5. Pt will sit EOB for 5 min with SBA.                           History:     History reviewed. No pertinent past medical  history.    History reviewed. No pertinent surgical history.    Time Tracking:     PT Received On: 09/05/23  PT Start Time: 1046     PT Stop Time: 1106  PT Total Time (min): 20 min     Billable Minutes: Evaluation 20      09/05/2023

## 2023-09-05 NOTE — PT/OT/SLP EVAL
Occupational Therapy  Evaluation    Name: Bozena Shelton  MRN: [id number]  Recent Surgery: * No surgery found *      Recommendations:     Discharge Recommendations:  (pending progress)  Discharge Equipment Recommendations:   (TBD)  Barriers to discharge:   (ongoing medical needs; severity of deficits)    Assessment:     Bozena Shelton is a 73 y.o. female with a medical diagnosis of fall, L hemorrhagic CVA, L temporal AVM, L MMA AV fistula, s/p unsuccessful attempt of embolization of L MMA. Performance deficits affecting function: weakness, impaired endurance, impaired self care skills, impaired functional mobility, gait instability, impaired balance, impaired cognition, decreased upper extremity function, decreased lower extremity function, abnormal tone, impaired coordination. Pt not following commands, spontaneous movement of LUE, RUE in slight flexor synergy; withdrawals and responds to noxious stimuli on all extremities except right hand below the wrist.    Rehab Prognosis: Fair; patient would benefit from acute skilled OT services to address these deficits and reach maximum level of function.       Plan:     Patient to be seen 6 x/week to address the above listed problems via self-care/home management, therapeutic activities, therapeutic exercises  Plan of Care Expires: 10/05/23  Plan of Care Reviewed with: patient    Subjective     Chief Complaint: unable to state  Patient/Family Comments/goals: unable to state    Occupational Profile:  Unknown PLOF/history.    Pain/Comfort:  Pain Rating 1: 0/10    Patients cultural, spiritual, Protestant conflicts given the current situation: no    Objective:     Communicated with: NSSHERI prior to session.  Patient found HOB elevated with blood pressure cuff, NG tube, oxygen, peripheral IV, pulse ox (continuous), SCD, telemetry (roll belt, mitt on L hand) upon OT entry to room.    General Precautions: Standard, aspiration, aphasia, seizure, fall (SBP<140/90)  Orthopedic Precautions:     Braces:    Respiratory Status: Nasal cannula, flow 2 L/min  Vital Signs:     Prior: 141/86 103 bpm 97% (cleared to sit EOB by RN)  Endin/86 106 bpm 97%    Occupational Performance:    Bed Mobility:    Patient completed Rolling/Turning to Right with total assistance  Patient completed Scooting/Bridging with total assistance  Patient completed Supine to Sit with total assistance and 2 persons  Patient completed Sit to Supine with total assistance and 2 persons  Sitting EOB with Max-Total A; lateral/posterior lean    Activities of Daily Living:  Lower Body Dressing: total assistance to doff/don socks    Cognitive/Visual Perceptual:  Cognitive/Psychosocial Skills:     -       Oriented to: unable to formally assess; however disoriented   -       Follows Commands/attention:Inattentive  -       Communication: aphasia  -       Safety awareness/insight to disability: impaired   Visual/Perceptual:      -Impaired  tracking and delayed startle response    Physical Exam:  Sensation:    -       Intact  responds to noxious stim in all extremities except right hand  Upper Extremity Range of Motion:     -       Right Upper Extremity: 0/5 noted; abnormal tone when PROM performed  -       Left Upper Extremity: moving spontaneously; appears to be WFL    Therapeutic Positioning  Risk for acquired pressure injuries is significantly increased due to impaired mobility, impaired sensation, inability to communicate toileting needs, and decreased level of consciousness .    OT interventions performed during the course of today's session in an effort to prevent and/or reduce acquired pressure injuries:   Education on Pressure Ulcer Prevention provided  Therapeutic positioning completed     Skin assessment: full body skin assessment was performed    Findings:  widespread bruising; cut on LUE forearm    OT recommendations for therapeutic positioning throughout hospitalization:   Follow [facility] Pressure Injury Prevention Protocol  [facility]  recommended for sacral protection while UIC      Patient Education:  Patient provided with verbal education regarding OT role/goals/POC, fall prevention, safety awareness, Discharge/DME recommendations, and pressure ulcer prevention.  Additional teaching is warranted.     Patient left right sidelying with all lines intact, call button in reach, and NSG notified    GOALS:   Multidisciplinary Problems       Occupational Therapy Goals          Problem: Occupational Therapy    Goal Priority Disciplines Outcome Interventions   Occupational Therapy Goal     OT, PT/OT Ongoing, Progressing    Description: STG: to be met in 2 weeks  1. Pt will follow >80% of simple one step commands  2. Pt will perform grooming EOB with min A.   3. Pt will complete UB dressing with min A.  4. Pt will perform functional grasp/reach/release of items >80% of trials in prep for increased participation in ADL tasks.   5. Pt will perform sit<>stand with mod A x 2 in prep for ADL t/fs.  6. Pt will demo visual attention to right side >80% of time with min verbal cues.                           History:     History reviewed. No pertinent past medical history.    History reviewed. No pertinent surgical history.    Time Tracking:     OT Date of Treatment:    OT Start Time: 1208  OT Stop Time: 1230  OT Total Time (min): 22 min    Billable Minutes:Evaluation high    9/5/2023

## 2023-09-05 NOTE — PROGRESS NOTES
Neurointerventional progress note:    Subjective:   No acute events overnight. Stable exam. Vitals stable. Off precedex this am. Started on seroquel for agitation.     Exam:  Vitals:    09/05/23 1200   BP: 138/80   Pulse: 105   Resp: 17   Temp:      Neuro exam  Confused and altered. Not oriented to place or time.    Does not open eyes to command or follow complex commands.   Pupils asymmetric (at baseline) and left is reactive)  Moves all 4 extremities spontaneously but not making any purposeful movements. Able to  and let go with the left hand. Mild weakness noted in RUE compared to left.   Localizes to pain in all 4.      Imaging: no new imaging available for review.     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 She also has a [place] Grade 1 left MMA AV fistula draining into the SSS. Attempted embolization of the AV fistula but was unsuccessful due to tortuosity in the left MMA.      Patient improving slowly but continues to remain aphasic and agitated which is improved with seroquel.     Discussed with Dr Barron ([facility]) regarding long term plan for the AVM with plan to perform pre-operative embolization prior to the AVM resection. We will obtain MRI brain with and without contrast with stealth protocol prior to the surgery. I will plan on performing angiogram for embolization on Sept 14 with a tentative plan for AVM resection on Sept 15 based on the angiogram results.     - if patient has rebleeding during this hospital course, we will discuss operative management vs transfer to [facility].   - -160  - PT/OT/ST    - will continue to monitor while inpatient. At this time, patient is not suitable for discharge given the mentation. Will discuss with the primary teams and family prior to patient is discharged.     Yaakov South MD  Vascular and Interventional Neurology

## 2023-09-05 NOTE — PROGRESS NOTES
[facility] - Emergency Dept  Pulmonary Critical Care Note    Patient Name: Bozena Shelton  MRN: [id number]  Admission Date: 8/30/2023  Hospital Length of Stay: 6 days  Code Status: Full Code  Attending Provider: Gokul Mejia MD  Primary Care Provider: Shannan, Primary Doctor     Subjective:     HPI:   Patient is a 73-year-old female with no past medical history on file who presented to the ED on 08/30 via EMS after sustaining a fall at home.  Per EMS, patient fell off the side of the bed but was caught by her .  Denied loss of consciousness and head trauma.  EMS reported that the patient was confused on arrival and having slurred speech with right-sided facial droop.  Last known normal time was around 2100 today.  Patient's family was not present in the ED during my evaluation of the patient and the patient was not able to contribute to history.    In the ED, CT head was conducted which displayed evidence of left hemorrhagic CVA.  Neurosurgery was consulted who recommended blood pressure control with systolic blood pressure less than 140 and repeat head CT in 6 hours.  Keppra for seizure prophylaxis.  Patient was admitted to the ICU for further care and management.    Hospital Course/Significant events:  8/30/23: admit to ICU for L hemorrhagic CVA  8/31/23: underwent diagnostic cerebral angiogram with interventional neurology    24 Hour Interval History:  Patient extubated on 09/03/2023.  Doing well this morning.  Patient is off of sedation.  Neurologically more alert, awake.  No agitation episodes overnight.  Will scheduled Seroquel 50 b.i.d. at this time. Will likely be able to downgrade today.    History reviewed. No pertinent past medical history.    History reviewed. No pertinent surgical history.  Social History     Socioeconomic History    Marital status:      No current outpatient medications    Current Inpatient Medications   levETIRAcetam (Keppra) IV (PEDS and ADULTS)  500 mg  Intravenous Q12H    potassium phosphate IVPB  15 mmol Intravenous Once    QUEtiapine  50 mg Oral BID       Current Intravenous Infusions   clevidipine 0 mg/hr (09/02/23 1920)    dexmedeTOMIDine (Precedex) infusion (titrating) Stopped (09/04/23 1200)     Review of system:  Unable to obtain due to patient's condition   Objective:       Intake/Output Summary (Last 24 hours) at 9/5/2023 0730  Last data filed at 9/5/2023 0627  Gross per 24 hour   Intake 3617.09 ml   Output --   Net 3617.09 ml           Vital Signs (Most Recent):  Temp: 99.1 °F (37.3 °C) (09/05/23 0421)  Pulse: 99 (09/05/23 0600)  Resp: (!) 24 (09/05/23 0600)  BP: (!) 143/85 (09/05/23 0600)  SpO2: 96 % (09/05/23 0600)  Body mass index is 17.8 kg/m².  Weight: 50 kg (110 lb 3.7 oz) Vital Signs (24h Range):  Temp:  [98.1 °F (36.7 °C)-99.4 °F (37.4 °C)] 99.1 °F (37.3 °C)  Pulse:  [] 99  Resp:  [8-26] 24  SpO2:  [85 %-100 %] 96 %  BP: (104-154)/() 143/85     Physical Exam  Vitals and nursing note reviewed.   Constitutional:       Comments: On 3 L nasal cannula, sedated on Precedex, gets agitated intermittently.   HENT:      Head: Normocephalic and atraumatic.   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  Left pupil is approximately 3 mm in diameter.   Cardiovascular:      Rate and Rhythm: Normal rate and regular rhythm.      Pulses: Normal pulses.      Heart sounds: Normal heart sounds.   Pulmonary:      Effort: Pulmonary effort is normal.      Breath sounds: Normal breath sounds.   Abdominal:      General: There is no distension.      Palpations: Abdomen is soft.   Musculoskeletal:         General: Signs of injury present. No swelling.   Skin:     General: Skin is warm and dry.   Neurological:      Comments: More awake and alert this AM; not to questions answers yes or no to the questions.  Not very cooperative when asking her to move her extremities       Lines/Drains/Airways        Drain  Duration                  NG/OG Tube 09/03/23 1752 nasogastric 18 Fr. Right nostril 1 day              Peripheral Intravenous Line  Duration                  Peripheral IV - Single Lumen 09/04/23 1200 20 G Posterior;Right Hand <1 day                    Significant Labs:    Lab Results   Component Value Date    WBC 11.96 (H) 09/05/2023    HGB 14.9 09/05/2023    HCT 43.3 09/05/2023    MCV 92.1 09/05/2023     09/05/2023     BMP  Lab Results   Component Value Date     09/05/2023    K 3.5 09/05/2023    CHLORIDE 101 09/05/2023    CO2 27 09/05/2023    BUN 11.9 09/05/2023    CREATININE 0.52 (L) 09/05/2023    CALCIUM 8.7 09/05/2023    AGAP 8.0 09/02/2023     ABG  Recent Labs   Lab 09/02/23 0945   PH 7.430   PO2 71.0*   PCO2 31.0*   HCO3 20.6   POCBASEDEF -2.80         Vent Mode: A/C (09/02/23 0810)  Ventilator Initiated: Yes (08/31/23 1310)  Set Rate: 20 BPM (09/02/23 0810)  Vt Set: 450 mL (09/02/23 0810)  Pressure Support: 10 cmH20 (09/01/23 1542)  PEEP/CPAP: 5 cmH20 (09/02/23 0810)  Oxygen Concentration (%): 40 (09/02/23 0810)  Peak Airway Pressure: 20 cmH20 (09/02/23 0810)  Total Ve: 7.4 L/m (09/02/23 0810)  F/VT Ratio<105 (RSBI): (!) 54.35 (09/02/23 0810)  Significant Imaging:  I have reviewed the pertinent imaging within the past 24 hours.  Assessment/Plan:     Assessment  Left-sided hemorrhagic CVA as demonstrated on CT head with no evidence of midline shift  Intubated 8/31 for procedures with interventional neurology- extubated on 9/2/23    Plan  1. Patient admitted to ICU for further care and management   3. Keppra for seizure prophylaxis 500 b.i.d.  4. Q4hr neuro checks, hold antiplatelets/anticoagulation  5. Interventional Neurology unsuccessful for embolization of left MMA AV fistula on 9/1/23; plan for outpatient intervention in 2-3 weeks  6. Off of Precedex  this a.m.; will increase to Seroquel 50 b.i.d. scheduled for agitation; likely to be able to downgrade today  7. Monitor  electrolytes; repleting as needed      45 minutes of critical care was time spent personally by me on the following activities: development of treatment plan with patient or surrogate and bedside caregivers, discussions with consultants, evaluation of patient's response to treatment, examination of patient, ordering and performing treatments and interventions, ordering and review of laboratory studies, ordering and review of radiographic studies, pulse oximetry, re-evaluation of patient's condition.  This critical care time did not overlap with that of any other provider or involve time for any procedures.     Chriss Becerra,   Pulmonary Critical Care Medicine  [facility] - Emergency Dept

## 2023-09-05 NOTE — CONSULTS
[facility]                       [address] [id number]    PATIENT NAME:       BOZENA PICKERING  YOB: 1950  CSN:                [id number]   MRN:                [id number]  ADMIT DATE:         08/30/2023 22:55:00  PHYSICIAN:          Theresa Barron MD                            CONSULTATION    DATE OF CONSULT:      Thank you Dr. Mejia for letting me see Bozena Pickering.    This is a 73-year-old female I was asked by Dr. South who was seeing this lady and treating her in the    in the future.  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  Currently, she is confused, looks around.  She is in restraints for the   moment on the left side.  She is weak on the right side.  Her speech is very   minimal with some words.    When I saw her, I looked at the pictures reviewed and I reviewed the scans and   the radiology findings and imaging findings with Dr. South who is planning to   bring her back and do an angiogram and embolization, subsequent potential   surgery.  I discussed with him how the surgery is done with taking the fistula   and also a hemicraniotomy for removal of the residual AVM.  Discussion based on   the findings of the angiogram depending number of feeders, whether the surgery   is to be done here or elsewhere.  I discussed at length with him yesterday when   all the pictures were reviewed and at this time the patient will go to rehab and   subsequently come back in a few weeks for further care.  We will also need to   get MRI of the brain with stealth protocol if we proceed with surgery here prior   to the surgery.        ______________________________  Theresa Barron MD    IM/JOANS  DD:  09/05/2023  Time:  06:27AM  DT:  09/05/2023  Time:  07:24AM  Job #:   [id number]      CONSULTATION

## 2023-09-06 LAB
ALBUMIN SERPL-MCNC: 3 G/DL (ref 3.4–4.8)
ALBUMIN/GLOB SERPL: 1 RATIO (ref 1.1–2)
ALP SERPL-CCNC: 75 UNIT/L (ref 40–150)
ALT SERPL-CCNC: 34 UNIT/L (ref 0–55)
AST SERPL-CCNC: 31 UNIT/L (ref 5–34)
BASOPHILS # BLD AUTO: 0.03 X10(3)/MCL
BASOPHILS NFR BLD AUTO: 0.2 %
BILIRUB SERPL-MCNC: 0.9 MG/DL
BUN SERPL-MCNC: 11.9 MG/DL (ref 9.8–20.1)
CALCIUM SERPL-MCNC: 8.5 MG/DL (ref 8.4–10.2)
CHLORIDE SERPL-SCNC: 99 MMOL/L (ref 98–107)
CO2 SERPL-SCNC: 26 MMOL/L (ref 23–31)
CREAT SERPL-MCNC: 0.5 MG/DL (ref 0.55–1.02)
EOSINOPHIL # BLD AUTO: 0.01 X10(3)/MCL (ref 0–0.9)
EOSINOPHIL NFR BLD AUTO: 0.1 %
ERYTHROCYTE [DISTWIDTH] IN BLOOD BY AUTOMATED COUNT: 13.4 % (ref 11.5–17)
GFR SERPLBLD CREATININE-BSD FMLA CKD-EPI: >60 MLS/MIN/1.73/M2
GLOBULIN SER-MCNC: 2.9 GM/DL (ref 2.4–3.5)
GLUCOSE SERPL-MCNC: 122 MG/DL (ref 82–115)
HCT VFR BLD AUTO: 42.5 % (ref 37–47)
HGB BLD-MCNC: 14.4 G/DL (ref 12–16)
IMM GRANULOCYTES # BLD AUTO: 0.06 X10(3)/MCL (ref 0–0.04)
IMM GRANULOCYTES NFR BLD AUTO: 0.5 %
LYMPHOCYTES # BLD AUTO: 0.93 X10(3)/MCL (ref 0.6–4.6)
LYMPHOCYTES NFR BLD AUTO: 7.1 %
MAGNESIUM SERPL-MCNC: 1.9 MG/DL (ref 1.6–2.6)
MCH RBC QN AUTO: 31.4 PG (ref 27–31)
MCHC RBC AUTO-ENTMCNC: 33.9 G/DL (ref 33–36)
MCV RBC AUTO: 92.8 FL (ref 80–94)
MONOCYTES # BLD AUTO: 1.32 X10(3)/MCL (ref 0.1–1.3)
MONOCYTES NFR BLD AUTO: 10.1 %
NEUTROPHILS # BLD AUTO: 10.71 X10(3)/MCL (ref 2.1–9.2)
NEUTROPHILS NFR BLD AUTO: 82 %
NRBC BLD AUTO-RTO: 0 %
PHOSPHATE SERPL-MCNC: 2.5 MG/DL (ref 2.3–4.7)
PLATELET # BLD AUTO: 153 X10(3)/MCL (ref 130–400)
PMV BLD AUTO: 10.6 FL (ref 7.4–10.4)
POTASSIUM SERPL-SCNC: 3.6 MMOL/L (ref 3.5–5.1)
PROT SERPL-MCNC: 5.9 GM/DL (ref 5.8–7.6)
RBC # BLD AUTO: 4.58 X10(6)/MCL (ref 4.2–5.4)
SODIUM SERPL-SCNC: 133 MMOL/L (ref 136–145)
WBC # SPEC AUTO: 13.06 X10(3)/MCL (ref 4.5–11.5)

## 2023-09-06 PROCEDURE — 93010 PR ELECTROCARDIOGRAM REPORT: ICD-10-PCS | Mod: ,,, | Performed by: INTERNAL MEDICINE

## 2023-09-06 PROCEDURE — 84100 ASSAY OF PHOSPHORUS: CPT

## 2023-09-06 PROCEDURE — 63600175 PHARM REV CODE 636 W HCPCS: Performed by: NURSE PRACTITIONER

## 2023-09-06 PROCEDURE — 93010 ELECTROCARDIOGRAM REPORT: CPT | Mod: ,,, | Performed by: INTERNAL MEDICINE

## 2023-09-06 PROCEDURE — 93005 ELECTROCARDIOGRAM TRACING: CPT

## 2023-09-06 PROCEDURE — 63600175 PHARM REV CODE 636 W HCPCS

## 2023-09-06 PROCEDURE — 11000001 HC ACUTE MED/SURG PRIVATE ROOM

## 2023-09-06 PROCEDURE — 97530 THERAPEUTIC ACTIVITIES: CPT

## 2023-09-06 PROCEDURE — 25000003 PHARM REV CODE 250: Performed by: INTERNAL MEDICINE

## 2023-09-06 PROCEDURE — 63600175 PHARM REV CODE 636 W HCPCS: Performed by: INTERNAL MEDICINE

## 2023-09-06 PROCEDURE — 80053 COMPREHEN METABOLIC PANEL: CPT

## 2023-09-06 PROCEDURE — 93010 ELECTROCARDIOGRAM REPORT: CPT | Mod: 76,,, | Performed by: INTERNAL MEDICINE

## 2023-09-06 PROCEDURE — 25000003 PHARM REV CODE 250: Performed by: STUDENT IN AN ORGANIZED HEALTH CARE EDUCATION/TRAINING PROGRAM

## 2023-09-06 PROCEDURE — 85025 COMPLETE CBC W/AUTO DIFF WBC: CPT

## 2023-09-06 PROCEDURE — 94761 N-INVAS EAR/PLS OXIMETRY MLT: CPT

## 2023-09-06 PROCEDURE — 99232 PR SUBSEQUENT HOSPITAL CARE,LEVL II: ICD-10-PCS | Mod: ,,, | Performed by: PSYCHIATRY & NEUROLOGY

## 2023-09-06 PROCEDURE — 99232 SBSQ HOSP IP/OBS MODERATE 35: CPT | Mod: ,,, | Performed by: PSYCHIATRY & NEUROLOGY

## 2023-09-06 PROCEDURE — 83735 ASSAY OF MAGNESIUM: CPT

## 2023-09-06 PROCEDURE — 25000003 PHARM REV CODE 250: Performed by: NURSE PRACTITIONER

## 2023-09-06 PROCEDURE — 21400001 HC TELEMETRY ROOM

## 2023-09-06 PROCEDURE — 97530 THERAPEUTIC ACTIVITIES: CPT | Mod: CO

## 2023-09-06 PROCEDURE — 27000221 HC OXYGEN, UP TO 24 HOURS

## 2023-09-06 RX ORDER — METOPROLOL TARTRATE 1 MG/ML
5 INJECTION, SOLUTION INTRAVENOUS EVERY 4 HOURS PRN
Status: DISCONTINUED | OUTPATIENT
Start: 2023-09-06 | End: 2023-09-11 | Stop reason: HOSPADM

## 2023-09-06 RX ORDER — SODIUM CHLORIDE 9 MG/ML
INJECTION, SOLUTION INTRAVENOUS CONTINUOUS
Status: DISCONTINUED | OUTPATIENT
Start: 2023-09-06 | End: 2023-09-09

## 2023-09-06 RX ORDER — DIGOXIN 0.25 MG/ML
250 INJECTION INTRAMUSCULAR; INTRAVENOUS ONCE
Status: COMPLETED | OUTPATIENT
Start: 2023-09-06 | End: 2023-09-06

## 2023-09-06 RX ORDER — DIGOXIN 0.25 MG/ML
125 INJECTION INTRAMUSCULAR; INTRAVENOUS EVERY 12 HOURS PRN
Status: DISCONTINUED | OUTPATIENT
Start: 2023-09-06 | End: 2023-09-06

## 2023-09-06 RX ORDER — METOPROLOL TARTRATE 1 MG/ML
5 INJECTION, SOLUTION INTRAVENOUS ONCE
Status: COMPLETED | OUTPATIENT
Start: 2023-09-06 | End: 2023-09-06

## 2023-09-06 RX ADMIN — SODIUM CHLORIDE: 9 INJECTION, SOLUTION INTRAVENOUS at 10:09

## 2023-09-06 RX ADMIN — LEVETIRACETAM 500 MG: 500 TABLET, FILM COATED ORAL at 09:09

## 2023-09-06 RX ADMIN — DIGOXIN 250 MCG: 0.25 INJECTION INTRAMUSCULAR; INTRAVENOUS at 07:09

## 2023-09-06 RX ADMIN — AMIODARONE HYDROCHLORIDE 150 MG: 1.5 INJECTION, SOLUTION INTRAVENOUS at 03:09

## 2023-09-06 RX ADMIN — METOPROLOL TARTRATE 5 MG: 1 INJECTION, SOLUTION INTRAVENOUS at 10:09

## 2023-09-06 RX ADMIN — SODIUM CHLORIDE 1000 ML: 9 INJECTION, SOLUTION INTRAVENOUS at 03:09

## 2023-09-06 RX ADMIN — QUETIAPINE FUMARATE 50 MG: 25 TABLET ORAL at 09:09

## 2023-09-06 RX ADMIN — QUETIAPINE FUMARATE 50 MG: 25 TABLET ORAL at 08:09

## 2023-09-06 RX ADMIN — METOPROLOL TARTRATE 5 MG: 1 INJECTION, SOLUTION INTRAVENOUS at 02:09

## 2023-09-06 RX ADMIN — DIGOXIN 250 MCG: 0.25 INJECTION INTRAMUSCULAR; INTRAVENOUS at 01:09

## 2023-09-06 RX ADMIN — AMIODARONE HYDROCHLORIDE 0.5 MG/MIN: 1.8 INJECTION, SOLUTION INTRAVENOUS at 09:09

## 2023-09-06 RX ADMIN — AMIODARONE HYDROCHLORIDE 1 MG/MIN: 1.8 INJECTION, SOLUTION INTRAVENOUS at 03:09

## 2023-09-06 RX ADMIN — SODIUM CHLORIDE 1000 ML: 9 INJECTION, SOLUTION INTRAVENOUS at 12:09

## 2023-09-06 RX ADMIN — LEVETIRACETAM 500 MG: 500 TABLET, FILM COATED ORAL at 10:09

## 2023-09-06 NOTE — PROGRESS NOTES
[facility] Hospital Medicine Progress Note        Code Status: Full code          Patient information was obtained from patient, patient's family, past medical records and ER records.      HISTORY OF PRESENT ILLNESS:   Bozena Shelton is a 73 y.o. female without significant past medical history presented to [facility] on 8/30/2023 following a fall at home.   reported patient fell off the edge of the bed, but was caught by him.   denied LOC or head trauma.  EMS reported patient was confused on scene with slurred speech and right-sided facial droop.  Patient's last known normal was 2100 on 08/30/2023.  CT head revealed intraparenchymal hematoma in the left temporal lobe with intraventricular extension and subarachnoid hemorrhage of the left temporal lobe in the left sylvian fissure.  CTA head and neck revealed possible vascular malformation involving the inferior margin of the left posterior temporal intraparenchymal hemorrhage.  Neurology and Neurosurgery were consulted.  Patient was admitted to ICU for close monitoring. Patient was started on hypertonic saline at 50 cc/hour with sodium goal of 145, Keppra 500 mg BID, and Cleviprex for BP parameters below 140/90.  Patient was intubated and underwent diagnostic cerebral angiogram with Interventional Neurologist Dr. 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  CT head on 08/31 revealed no significant changes.  Embolization of the left middle meningeal artery arteriovenous fistula was unsuccessful secondary to tortuosity on 09/01 by Dr. South.  Cleviprex and hypertonic saline were discontinued on 09/02 with placement of feeding tube. Patient was extubated on 09/03 interventional neurology recommended AVM embolization in 3-4 weeks after the ICH has had time to resolve.  Patient was placed in restraints and started on Precedex for agitation.   Precedex was weaned and patient was started on Seroquel 50 mg for agitation on 9/04. Patient was cleared for downgrade out of ICU on 09/05. Hospital medicine was consulted for transition of care and further medical management.      Patient currently being managed for acute encephalopathy due to left-sided hemorrhagic stroke from left temporal AV malformation.  Patient with right residual deficits, aphasia, oropharyngeal dysphagia on NG tube feeds.    Today's information  Patient seen and examined at bedside, nurses at bedside   Patient remains encephalopathic unable to communicate   Heart rate been running very high in the 150s to 160s   Blood pressure very soft 110s over 60s   She remains on 2 L of oxygen  White cell count reviewed shows slight trend upwards of 13.06 will continue to monitor  Spoke to  on phone would like to make patient DNR, appropriately    Exam  General appearance: Female in no apparent distress.  NG tube feeds  Lungs:  Bilateral rales  Heart:  Tachycardia, S1-S2 only no murmurs rubs or gallops  Abdomen: Soft, non-distended, non-tender. Bowel sounds are normal. Roll belt in place   Neuro:  Encephalopathic.  Does not follow commands. 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   Continue with restraints as needed   Blood pressure is well controlled, borderline low  Keppra 500 mg b.i.d.  Neurosurgery following, appreciate  recommendations   Interventional Neurology following, appreciate recommendations- plan for AVM embolization in 3-4 weeks after ICH has had time to resolve  EKG shows sinus tachycardia, patient looks dehydrated at bedside  IV normal saline 1 L bolus in continue with 100 cc/hour   IV digoxin 250 mcg x 1 stat and 125 mcg Q 12 p.r.n.  Monitor blood pressure closely   Continue oxygen supplementation to keep SpO2 greater than 94%   Trend WBC if trending upwards patient may benefit from IV cefepime/meropenem given recent brain instrumentation, may need to consult ID   Continue NG tube feeds   Patient made DNR by spouse   PT/OT/ST      Prognosis is poor , will benefit from goals of care discussion with palliative Care    Disposition plan to send to new rehab when mentation improves so after her definitive surgery      VTE Prophylaxis:  SCDs     VITAL SIGNS: 24 HRS MIN & MAX LAST   Temp  Min: 98.4 °F (36.9 °C)  Max: 99.1 °F (37.3 °C) 98.8 °F (37.1 °C)   BP  Min: 105/74  Max: 142/75 117/65   Pulse  Min: 104  Max: 144  (!) 144   Resp  Min: 16  Max: 24 18   SpO2  Min: 85 %  Max: 97 % (!) 85 %     I have reviewed the following labs:  Recent Labs   Lab 09/04/23 0222 09/05/23 0207 09/06/23 0227   WBC 10.21 11.96* 13.06*   RBC 4.32 4.70 4.58   HGB 14.1 14.9 14.4   HCT 40.5 43.3 42.5   MCV 93.8 92.1 92.8   MCH 32.6* 31.7* 31.4*   MCHC 34.8 34.4 33.9   RDW 13.4 13.2 13.4    153 153   MPV 10.3 10.3 10.6*     Recent Labs   Lab 08/31/23  1510 09/01/23  0007 09/01/23  1542 09/01/23  1552 09/02/23  0945 09/02/23  1144 09/04/23 0222 09/05/23 0207 09/06/23 0227   NA  --    < >  --    < >  --    < > 141 138 133*   K  --    < >  --    < >  --    < > 3.5 3.5 3.6   CO2  --    < >  --    < >  --    < > 22* 27 26   BUN  --    < >  --    < >  --    < > 10.1 11.9 11.9   CREATININE  --    < >  --    < >  --    < > 0.44* 0.52* 0.50*   CALCIUM  --    < >  --    < >  --    < > 8.4 8.7 8.5   PH 7.430  --  7.440  --  7.430  --   --   --   --     MG  --    < >  --    < >  --    < > 1.80 1.60 1.90   ALBUMIN  --    < >  --    < >  --    < > 3.2* 3.2* 3.0*   ALKPHOS  --    < >  --    < >  --    < > 64 77 75   ALT  --    < >  --    < >  --    < > 28 32 34   AST  --    < >  --    < >  --    < > 40* 34 31   BILITOT  --    < >  --    < >  --    < > 1.2 1.0 0.9    < > = values in this interval not displayed.     Microbiology Results (last 7 days)       ** No results found for the last 168 hours. **             See below for Radiology    Scheduled Med:   levETIRAcetam  500 mg Oral BID    QUEtiapine  50 mg Oral BID      Continuous Infusions:   sodium chloride 0.9% 100 mL/hr at 09/06/23 1049      PRN Meds:  digoxin, labetalol     Assessment/Plan:      VTE prophylaxis:     Patient condition:  Stable/Fair/Guarded/ Serious/ Critical    Anticipated discharge and Disposition:         All diagnosis and differential diagnosis have been reviewed; assessment and plan has been documented; I have personally reviewed the labs and test results that are presently available; I have reviewed the patients medication list; I have reviewed the consulting providers response and recommendations. I have reviewed or attempted to review medical records based upon their availability    All of the patient's questions have been  addressed and answered. Patient's is agreeable to the above stated plan. I will continue to monitor closely and make adjustments to medical management as needed.  _____________________________________________________________________    Nutrition Status:    Radiology:  I have personally reviewed the following imaging and agree with the radiologist.     XR Gastric tube check, non-radiologist performed  Narrative: EXAMINATION:  XR GASTRIC TUBE CHECK, NON-RADIOLOGIST PERFORMED    CLINICAL HISTORY:  ng tube;    COMPARISON:  None    FINDINGS:  Frontal image of the upper abdomen.  Enteric tube tip near the expected area of the GE junction.  Suggest advancing 5 cm if  possible.  Impression: As above.    Electronically signed by: Roni Sierra  Date:    09/03/2023  Time:    18:23  CT Head Without Contrast  Narrative: EXAMINATION:  CT HEAD WITHOUT CONTRAST    CLINICAL HISTORY:  Stroke, follow up;    TECHNIQUE:  Axial scans were obtained from skull base to the vertex.    Coronal and sagittal reconstructions obtained from the axial data.    Automatic exposure control was utilized to limit radiation dose.    Contrast: None    Radiation Dose:    Total DLP: 1045 mGy*cm    COMPARISON:  CT head dated 08/31/2023    FINDINGS:  There is stable size of the left cerebral parenchymal hemorrhage with surrounding edema.  There is a small amount of interventricular hemorrhage layering in the occipital horns.  Small volume of surrounding subarachnoid hemorrhage has overall decreased.    There is stable mass effect with partial effacement the left lateral ventricle.  The ventricles are stable in size with mild dilatation of the left temporal horn.  There is no midline shift or herniation.  The basal cisterns are patent.  The calvarium and skull base are intact.  Impression: Decreasing volume of subarachnoid hemorrhage with otherwise stable exam.    No major change from the [facility] interpretation.    Electronically signed by: Stacey Perez  Date:    09/03/2023  Time:    08:50      Irma Roberts MD   09/06/2023

## 2023-09-06 NOTE — PT/OT/SLP PROGRESS
Pt remains minimally responsive with poor secretion management.  Attempts at PO intake remain unsafe.  Will continue to follow and tx as appropriate.

## 2023-09-06 NOTE — PHYSICIAN QUERY
PT Name: Bozena Shelton  MR #: [id number]    DOCUMENTATION CLARIFICATION     CDS/:  Mitch White RN, CDS                   Contact Information:  [email or web address]    This form is a permanent document in the medical record.     Query Date: September 6, 2023    By submitting this query, we are merely seeking further clarification of documentation.. Please utilize your independent clinical judgment when addressing the question(s) below.    The medical record contains the following:   Indicators  Supporting Clinical Findings     Location in Medical Record   X Registered Dietician Diagnosis Malnutrition Level: Severe        Malnutrition Context: chronic illness   Nutrition PN 9/3    Energy Intake      Weight Loss     X Fat Loss Severe Depletion   Nutrition PN 9/3   X Muscle Loss Severe Depletion   Nutrition PN 9/3    Edema/Fluid Accumulation      Reduced  Strength (by dynamometer)     X Weight, BMI, Usual Body Weight Last Weight: 50 kg (110 lb 3.7 oz)     BMI (Calculated): 17.8 Nutrition PN 9/3          Delayed Wound Healing     X Acute or Chronic Illness Left sided hemorrhagic CVA   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  Tube feeding recommendation:   Isosource HN @ 75ml/hr (goal rate, based on tube feeding running ~20 hours/day)    nasogastric 18 Fr. Right nostril 1 day  Nutrition PN 9/3          Pulm PN 9/5     X Other 9/1/23: Discussed with RN. Will provide tube feeding recommendations for when appropriate to start tube feeding. Receiving kcal from meds. Possible plans for extubation post procedure today. Will likely place NG prior to extubation per RN.      9/3/23: Pt now extubated. Plans for starting TF per RN. NG to be placed. Pt not able to verify subjective info due to mental status   Nutrition PN 9/3      Academy of Nutrition and Dietetics (Academy) and the American Society for Parenteral and Enteral Nutrition (A.S.P.E.N.) Clinical Characteristics to support Malnutrition      Criteria for mild malnutrition is defined as 1 characteristic outlined above within the established moderate or severe parameters.  A minimum of 2 out of the 6 characteristics noted above are recommended for a diagnosis of moderate or severe malnutrition.  Chronic illness/injury is a disease/condition lasting 3 months or longer.    The noted clinical guidelines are only system guidelines and do not replace the providers clinical judgment.    Provider, please clarify/confirm the nutrition diagnosis as noted above.    [  xxx] Severe Malnutrition - a minimum of 2 of the 6 severe malnutrition characteristics noted above      [  ] Other Nutritional Diagnosis (please specify): _______     [  ] Malnutrition ruled out       Please document in your progress notes daily for the duration of treatment until resolved and  include in your discharge summary.      References:    CHRISTOPHER Ospina, & JALIL Rain (2022, April). Assessment and management of anorexia and cachexia in palliative care. Retrieved May 23, 2022, from https://[email or web address]/contents/assessment-and-management-of-anorexia-and-cachexia-in-palliative-care?[email or web address]&source=see_link     SACHI Hunter, PhD, RD, Wagner MADRIGAL P., PhD, RN, CHANTAL Lemon MD, PhD, Yulia LORENZ A., MS, RD, [facility], PATRICE Carter, MS, RD, The Academy Malnutrition Work Group, The A.S.P.E.N. Board of Directors. (2012). Consensus Statement: Academy of Nutrition and Dietetics and American Society for Parenteral and Enteral Nutrition: Characteristics Recommended for the Identification and Documentation of Adult Malnutrition (Undernutrition). Journal of Parenteral and Enteral Nutrition, 36(3), 275-283. doi:10.1177/0115609777106381     Form No. 79299

## 2023-09-06 NOTE — PROGRESS NOTES
Neurointerventional progress note:    Subjective: no acute events overnight. Mentation remains the same. Vitals stable.     Exam:  Vitals:    09/06/23 1415   BP: 114/61   Pulse:    Resp:    Temp:      Neuro exam  Lethargic and not oriented.   Opens eyes to voice but does not follow verbal commands  Moves left side antigravity. No spontaneous movement noted on right  Withdraws briskly on right to painful stimuli. Localizes on left.       Imaging:   No new imaging available for review at this time.     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 She also has a [place] Grade 1 left MMA AV fistula draining into the SSS. Attempted embolization of the AV fistula but was unsuccessful due to tortuosity in the left MMA.      Reviewed DSA images with Dr Barron today. Discussed the plan of care given the current mental status and overall clinical condition. Will tentatively plan for performing pre-operative AVM embolization on Sept 21 (Thurs) with plan to perform AVM resection on Sept 22 (Fri; Dr Barron) with intra-operative angiogram. Will also plan to obtain MRI brain with and without contrast with stealth protocol prior to the surgery before or after the embolization.       - if patient has rebleeding during this hospital course, we will discuss operative management vs transfer to [place].   - -160  - PT/OT/ST     - At this time, patient is not suitable for discharge given the mentation. Will discuss with the primary teams and family prior to patient is discharged. Please notify Dr South prior to patient being discharged from the hospital.      Yaakov South MD  Vascular and Interventional Neurology

## 2023-09-06 NOTE — PLAN OF CARE
Spoke to patient spouse (Marc) about LTAC placement when medically ready, he would like for patient to be referred to [facility], referral sent.      09/06/23 1243   Discharge Assessment   Assessment Type Discharge Planning Assessment   Confirmed/corrected address, phone number and insurance Yes   Confirmed Demographics Correct on Facesheet   Source of Information family   Does patient/caregiver understand observation status Yes   Communicated JAQUELINE with patient/caregiver Yes;Date not available/Unable to determine   People in Home spouse   Prior to hospitilization cognitive status: Unable to Assess   Current cognitive status: Unable to Assess   Equipment Currently Used at Home none   Readmission within 30 days? No   Do you currently have service(s) that help you manage your care at home? No   Do you take prescription medications? Yes   Do you have prescription coverage? Yes   Coverage [employer] Managed Medicare   How do you get to doctors appointments? family or friend will provide   Are you on dialysis? No   Do you take coumadin? No   Discharge Plan discussed with: Adult children;Spouse/sig other   Name(s) and Number(s) Marc Shelton    Spouse   [phone]   Discharge Plan A Long-term acute care facility (LTAC)   Discharge Plan B   (To be determined)

## 2023-09-06 NOTE — PT/OT/SLP PROGRESS
Occupational Therapy   Treatment    Name: Bozena Shelton  MRN: [id number]  Admitting Diagnosis:  Fall       Recommendations:     Discharge Recommendations: LTACH (long-term acute care hospital)  Discharge Equipment Recommendations:   (TBD)  Barriers to discharge:       Assessment:     Bozena Shelton is a 73 y.o. female with a medical diagnosis of Fall.  She presents with increased lethargy. Performance deficits affecting function are weakness, impaired endurance, impaired self care skills, impaired functional mobility, impaired balance.     Rehab Prognosis:  Poor; patient would benefit from acute skilled OT services to address these deficits and reach maximum level of function.       Plan:     Patient to be seen 6 x/week to address the above listed problems via self-care/home management, therapeutic activities, therapeutic exercises  Plan of Care Expires: 10/05/23  Plan of Care Reviewed with: patient    Subjective     Pain/Comfort:       Objective:     Communicated with: RN prior to session.  Patient found HOB elevated with   upon OT entry to room.    General Precautions: Standard, aspiration, aphasia, seizure, fall (SBP<140/90)    Orthopedic Precautions:   Braces:    Respiratory Status: Nasal cannula, flow 2 L/min  Vital Signs: Blood Pressure: 97/69  HR: 107-140  Sp02: 94     Occupational Performance:   Increased lethargy noted upon entry, Elevated HR throughout session RN notified.   Supported Long sit performed Total A, increased lethargy unable to assist.   PROM performed to R UE.  Grooming task performed [place] A with L UE for excursion to mouth.   Pt. Repositioned in bed appropriately with roll belt.       Therapeutic Positioning    OT interventions performed during the course of today's session in an effort to prevent and/or reduce acquired pressure injuries:   Therapeutic positioning completed         [facility] 6 Click ADL:      Patient left HOB elevated with all lines intact, call button in reach, and restraints  reapplied at end of session    GOALS:   Multidisciplinary Problems       Occupational Therapy Goals          Problem: Occupational Therapy    Goal Priority Disciplines Outcome Interventions   Occupational Therapy Goal     OT, PT/OT Ongoing, Progressing    Description: STG: to be met in 2 weeks  1. Pt will follow >80% of simple one step commands  2. Pt will perform grooming EOB with min A.   3. Pt will complete UB dressing with min A.  4. Pt will perform functional grasp/reach/release of items >80% of trials in prep for increased participation in ADL tasks.   5. Pt will perform sit<>stand with mod A x 2 in prep for ADL t/fs.  6. Pt will demo visual attention to right side >80% of time with min verbal cues.                           Time Tracking:     OT Date of Treatment: 09/06/23  OT Start Time: 1005  OT Stop Time: 1015  OT Total Time (min): 10 min    Billable Minutes:Therapeutic Activity 1    OT/CANDY: CANDY     Number of CANDY visits since last OT visit: 1    9/6/2023

## 2023-09-06 NOTE — PT/OT/SLP PROGRESS
Physical Therapy Treatment    Patient Name:  Bozena Shelton   MRN:  [id number]    Recommendations:     Discharge Recommendations: LTACH (long-term acute care hospital), nursing facility, skilled  Discharge Equipment Recommendations: to be determined by next level of care  Barriers to discharge: Impaired mobility and Ongoing medical needs    Assessment:     Bozena Shelton is a 73 y.o. female admitted with a medical diagnosis of fall, L hemorrhagic CVA, L temporal AVM, L MMA AV fistula, s/p unsuccessful attempt of embolization of L MMA.  She presents with the following impairments/functional limitations: weakness, impaired endurance, impaired self care skills, impaired functional mobility, gait instability, impaired balance, impaired cognition, decreased upper extremity function, decreased lower extremity function. Pt more lethargic today and need maximal stimulation to open eyes. Pt still unable to follow commands. Performed PROM R ankle to neutral, gentle ROM to c-spine into R rotation and R lateral flexion. Pt required total A x2 for all mobility and sat EOB.     Rehab Prognosis:  guarded ; patient would benefit from acute skilled PT services to address these deficits and reach maximum level of function.    Recent Surgery: * No surgery found *      Plan:     During this hospitalization, patient to be seen 5 x/week to address the identified rehab impairments via gait training, therapeutic activities, therapeutic exercises, wheelchair management/training and progress toward the following goals:    Plan of Care Expires:  10/05/23    Subjective     Chief Complaint: CYN  Patient/Family Comments/goals: CYN  Pain/Comfort:  Pain Rating 1: 0/10      Objective:     Communicated with nurse prior to session.  Patient found right sidelying with NG tube, restraints, blood pressure cuff, telemetry, pulse ox (continuous), peripheral IV, SCD upon PT entry to room.     General Precautions: Standard, aphasia, aspiration, fall, seizure,  other (see comments) (BP<140/90)  Orthopedic Precautions:    Braces: N/A  Respiratory Status: Nasal cannula, flow 2.5 L/min  Blood Pressure: 118/73, 95%, HR ranged from 115-130s.  Skin Integrity: Visible skin intact      Functional Mobility:  Bed Mobility:     Rolling Left:  total assistance  Rolling Right: total assistance  Scooting: total assistance and of 2 persons  Supine to Sit: total assistance and of 2 persons  Sit to Supine: total assistance and of 2 persons  Balance: Max A for sitting balance at EOB, R and posterior lean.     Therapeutic Activities/Exercises:  See assessment    Education:  Patient provided with verbal education regarding PT POC.  Additional teaching is warranted.     Patient left right sidelying with all lines intact, call button in reach, and nurse notified..    GOALS:   Multidisciplinary Problems       Physical Therapy Goals          Problem: Physical Therapy    Goal Priority Disciplines Outcome Goal Variances Interventions   Physical Therapy Goal     PT, PT/OT Ongoing, Progressing     Description: Goals to be met by: 10/5/2023     Patient will increase functional independence with mobility by performin. Supine to sit with MInimal Assistance  2. Sit to stand transfer with Minimal Assistance  3. Bed to chair transfer with Minimal Assistance using LRAD  4. Gait  x 50 feet with Minimal Assistance using LRAD.  5. Pt will sit EOB for 5 min with SBA.                           Time Tracking:     PT Received On: 23  PT Start Time: 947     PT Stop Time: 1005  PT Total Time (min): 18 min     Billable Minutes: Therapeutic Activity 18    Treatment Type: Treatment  PT/PTA: PT     Number of PTA visits since last PT visit: 1     2023

## 2023-09-06 NOTE — NURSING
Nurses Note -- 4 Eyes      9/6/2023   5:02 PM      Skin assessed during: Daily Assessment      [x] No Altered Skin Integrity Present    []Prevention Measures Documented      [] Yes- Altered Skin Integrity Present or Discovered   [] LDA Added if Not in Epic (Describe Wound)   [] New Altered Skin Integrity was Present on Admit and Documented in LDA   [] Wound Image Taken    Wound Care Consulted? No    Attending Nurse:  Mara Murrieta RN     Second RN/Staff Member:  Beka Alford RN

## 2023-09-06 NOTE — CONSULTS
Inpatient consult to Cardiology  Consult performed by: Melanie Calderon FNP  Consult ordered by: Irma Roberts MD  Reason for consult: AFL RVR        [facility] - [facility]    Cardiology  Consult Note    Patient Name: Bozena Shelton  MRN: [id number]  Admission Date: 8/30/2023  Hospital Length of Stay: 7 days  Code Status: DNR   Attending Provider: Dev Diaz MD   Consulting Provider: ELIANA Samson  Primary Care Physician: Shannan, Primary Doctor  Principal Problem:<principal problem not specified>    Patient information was obtained from past medical records, ER records, and primary team.     Subjective:   Consultation Reason: AFL RVR    HPI:   Ms. Shelton is a 73 year old female, unknown to [facility], who presented to the hospital status post fall at home. Her  reported that she slipped off the edge of the bed. He caught her. He denied patient had LOC or head trauma. EMS reported patient was confused on scene with slurred speech and right-sided facial droop.  Patient's last known normal was 2100 on 08/30/2023.  CT head revealed intraparenchymal hematoma in the left temporal lobe with intraventricular extension and subarachnoid hemorrhage of the left temporal lobe in the left sylvian fissure.  CTA head and neck revealed possible vascular malformation involving the inferior margin of the left posterior temporal intraparenchymal hemorrhage. Patient noted to be tachycardic since arrival. On 9.6.23 patient noted to be AFL RVR. [facility] is consulted for AFL Management.    PMH: Unable to Obtain  PSH: Unable to Obtain  Family History: Unable to Obtain  Social History: Unable to Obtain    Previous Cardiac Diagnostics:   No Cardiac Diagnostics on File.    Review of patient's allergies indicates:  Not on File    No current facility-administered medications on file prior to encounter.     No current outpatient medications on file prior to encounter.     Review of Systems   Unable to perform ROS: Patient  nonverbal     Objective:     Vital Signs (Most Recent):  Temp: 98.8 °F (37.1 °C) (09/06/23 1400)  Pulse: 105 (09/06/23 1500)  Resp: 18 (09/06/23 1500)  BP: 103/75 (09/06/23 1500)  SpO2: 95 % (09/06/23 1500) Vital Signs (24h Range):  Temp:  [98.4 °F (36.9 °C)-99.1 °F (37.3 °C)] 98.8 °F (37.1 °C)  Pulse:  [103-154] 105  Resp:  [14-24] 18  SpO2:  [85 %-97 %] 95 %  BP: (103-133)/(57-95) 103/75     Weight: 50 kg (110 lb 3.7 oz)  Body mass index is 17.8 kg/m².    SpO2: 95 %         Intake/Output Summary (Last 24 hours) at 9/6/2023 1554  Last data filed at 9/6/2023 1410  Gross per 24 hour   Intake 1815 ml   Output 150 ml   Net 1665 ml       Lines/Drains/Airways       Drain  Duration                  NG/OG Tube 09/03/23 1752 nasogastric 18 Fr. Right nostril 2 days              Peripheral Intravenous Line  Duration                  Peripheral IV - Single Lumen 09/04/23 1200 20 G Posterior;Right Hand 2 days         Peripheral IV - Single Lumen 09/06/23 1405 20 G Anterior;Left;Proximal Forearm <1 day                    Significant Labs:  Recent Results (from the past 72 hour(s))   Comprehensive Metabolic Panel    Collection Time: 09/04/23  2:22 AM   Result Value Ref Range    Sodium Level 141 136 - 145 mmol/L    Potassium Level 3.5 3.5 - 5.1 mmol/L    Chloride 108 (H) 98 - 107 mmol/L    Carbon Dioxide 22 (L) 23 - 31 mmol/L    Glucose Level 107 82 - 115 mg/dL    Blood Urea Nitrogen 10.1 9.8 - 20.1 mg/dL    Creatinine 0.44 (L) 0.55 - 1.02 mg/dL    Calcium Level Total 8.4 8.4 - 10.2 mg/dL    Protein Total 5.9 5.8 - 7.6 gm/dL    Albumin Level 3.2 (L) 3.4 - 4.8 g/dL    Globulin 2.7 2.4 - 3.5 gm/dL    Albumin/Globulin Ratio 1.2 1.1 - 2.0 ratio    Bilirubin Total 1.2 <=1.5 mg/dL    Alkaline Phosphatase 64 40 - 150 unit/L    Alanine Aminotransferase 28 0 - 55 unit/L    Aspartate Aminotransferase 40 (H) 5 - 34 unit/L    eGFR >60 mls/min/1.73/m2   Magnesium    Collection Time: 09/04/23  2:22 AM   Result Value Ref Range    Magnesium Level  1.80 1.60 - 2.60 mg/dL   Phosphorus    Collection Time: 09/04/23  2:22 AM   Result Value Ref Range    Phosphorus Level 1.9 (L) 2.3 - 4.7 mg/dL   CBC with Differential    Collection Time: 09/04/23  2:22 AM   Result Value Ref Range    WBC 10.21 4.50 - 11.50 x10(3)/mcL    RBC 4.32 4.20 - 5.40 x10(6)/mcL    Hgb 14.1 12.0 - 16.0 g/dL    Hct 40.5 37.0 - 47.0 %    MCV 93.8 80.0 - 94.0 fL    MCH 32.6 (H) 27.0 - 31.0 pg    MCHC 34.8 33.0 - 36.0 g/dL    RDW 13.4 11.5 - 17.0 %    Platelet 137 130 - 400 x10(3)/mcL    MPV 10.3 7.4 - 10.4 fL    Neut % 86.1 %    Lymph % 7.1 %    Mono % 5.5 %    Eos % 0.5 %    Basophil % 0.4 %    Lymph # 0.72 0.6 - 4.6 x10(3)/mcL    Neut # 8.80 2.1 - 9.2 x10(3)/mcL    Mono # 0.56 0.1 - 1.3 x10(3)/mcL    Eos # 0.05 0 - 0.9 x10(3)/mcL    Baso # 0.04 <=0.2 x10(3)/mcL    IG# 0.04 0 - 0.04 x10(3)/mcL    IG% 0.4 %    NRBC% 0.0 %   Comprehensive Metabolic Panel    Collection Time: 09/05/23  2:07 AM   Result Value Ref Range    Sodium Level 138 136 - 145 mmol/L    Potassium Level 3.5 3.5 - 5.1 mmol/L    Chloride 101 98 - 107 mmol/L    Carbon Dioxide 27 23 - 31 mmol/L    Glucose Level 134 (H) 82 - 115 mg/dL    Blood Urea Nitrogen 11.9 9.8 - 20.1 mg/dL    Creatinine 0.52 (L) 0.55 - 1.02 mg/dL    Calcium Level Total 8.7 8.4 - 10.2 mg/dL    Protein Total 6.1 5.8 - 7.6 gm/dL    Albumin Level 3.2 (L) 3.4 - 4.8 g/dL    Globulin 2.9 2.4 - 3.5 gm/dL    Albumin/Globulin Ratio 1.1 1.1 - 2.0 ratio    Bilirubin Total 1.0 <=1.5 mg/dL    Alkaline Phosphatase 77 40 - 150 unit/L    Alanine Aminotransferase 32 0 - 55 unit/L    Aspartate Aminotransferase 34 5 - 34 unit/L    eGFR >60 mls/min/1.73/m2   Magnesium    Collection Time: 09/05/23  2:07 AM   Result Value Ref Range    Magnesium Level 1.60 1.60 - 2.60 mg/dL   Phosphorus    Collection Time: 09/05/23  2:07 AM   Result Value Ref Range    Phosphorus Level 1.9 (L) 2.3 - 4.7 mg/dL   CBC with Differential    Collection Time: 09/05/23  2:07 AM   Result Value Ref Range    WBC  11.96 (H) 4.50 - 11.50 x10(3)/mcL    RBC 4.70 4.20 - 5.40 x10(6)/mcL    Hgb 14.9 12.0 - 16.0 g/dL    Hct 43.3 37.0 - 47.0 %    MCV 92.1 80.0 - 94.0 fL    MCH 31.7 (H) 27.0 - 31.0 pg    MCHC 34.4 33.0 - 36.0 g/dL    RDW 13.2 11.5 - 17.0 %    Platelet 153 130 - 400 x10(3)/mcL    MPV 10.3 7.4 - 10.4 fL    Neut % 86.0 %    Lymph % 5.5 %    Mono % 7.8 %    Eos % 0.1 %    Basophil % 0.3 %    Lymph # 0.66 0.6 - 4.6 x10(3)/mcL    Neut # 10.29 (H) 2.1 - 9.2 x10(3)/mcL    Mono # 0.93 0.1 - 1.3 x10(3)/mcL    Eos # 0.01 0 - 0.9 x10(3)/mcL    Baso # 0.03 <=0.2 x10(3)/mcL    IG# 0.04 0 - 0.04 x10(3)/mcL    IG% 0.3 %    NRBC% 0.0 %   Comprehensive Metabolic Panel    Collection Time: 09/06/23  2:27 AM   Result Value Ref Range    Sodium Level 133 (L) 136 - 145 mmol/L    Potassium Level 3.6 3.5 - 5.1 mmol/L    Chloride 99 98 - 107 mmol/L    Carbon Dioxide 26 23 - 31 mmol/L    Glucose Level 122 (H) 82 - 115 mg/dL    Blood Urea Nitrogen 11.9 9.8 - 20.1 mg/dL    Creatinine 0.50 (L) 0.55 - 1.02 mg/dL    Calcium Level Total 8.5 8.4 - 10.2 mg/dL    Protein Total 5.9 5.8 - 7.6 gm/dL    Albumin Level 3.0 (L) 3.4 - 4.8 g/dL    Globulin 2.9 2.4 - 3.5 gm/dL    Albumin/Globulin Ratio 1.0 (L) 1.1 - 2.0 ratio    Bilirubin Total 0.9 <=1.5 mg/dL    Alkaline Phosphatase 75 40 - 150 unit/L    Alanine Aminotransferase 34 0 - 55 unit/L    Aspartate Aminotransferase 31 5 - 34 unit/L    eGFR >60 mls/min/1.73/m2   Magnesium    Collection Time: 09/06/23  2:27 AM   Result Value Ref Range    Magnesium Level 1.90 1.60 - 2.60 mg/dL   Phosphorus    Collection Time: 09/06/23  2:27 AM   Result Value Ref Range    Phosphorus Level 2.5 2.3 - 4.7 mg/dL   CBC with Differential    Collection Time: 09/06/23  2:27 AM   Result Value Ref Range    WBC 13.06 (H) 4.50 - 11.50 x10(3)/mcL    RBC 4.58 4.20 - 5.40 x10(6)/mcL    Hgb 14.4 12.0 - 16.0 g/dL    Hct 42.5 37.0 - 47.0 %    MCV 92.8 80.0 - 94.0 fL    MCH 31.4 (H) 27.0 - 31.0 pg    MCHC 33.9 33.0 - 36.0 g/dL    RDW 13.4  11.5 - 17.0 %    Platelet 153 130 - 400 x10(3)/mcL    MPV 10.6 (H) 7.4 - 10.4 fL    Neut % 82.0 %    Lymph % 7.1 %    Mono % 10.1 %    Eos % 0.1 %    Basophil % 0.2 %    Lymph # 0.93 0.6 - 4.6 x10(3)/mcL    Neut # 10.71 (H) 2.1 - 9.2 x10(3)/mcL    Mono # 1.32 (H) 0.1 - 1.3 x10(3)/mcL    Eos # 0.01 0 - 0.9 x10(3)/mcL    Baso # 0.03 <=0.2 x10(3)/mcL    IG# 0.06 (H) 0 - 0.04 x10(3)/mcL    IG% 0.5 %    NRBC% 0.0 %       Significant Imaging:  Imaging Results              CT Cervical Spine Without Contrast (Final result)  Result time 08/31/23 08:17:56      Final result by Stacey Perez MD (08/31/23 08:17:56)                   Impression:      No acute fracture identified.    No significant change from the [facility] interpretation.      Electronically signed by: Stacey Perez  Date:    08/31/2023  Time:    08:17               Narrative:    EXAMINATION:  CT CERVICAL SPINE WITHOUT CONTRAST    CLINICAL HISTORY:  Neck trauma (Age >= 65y);    TECHNIQUE:  Noncontrast CT images of the cervical spine. Axial, coronal, and sagittal reformatted images were obtained. Dose length product is 1661 mGycm. Automatic exposure control, adjustment of mA/kV or iterative reconstruction technique was used to limit radiation dose.    COMPARISON:  None    FINDINGS:  The cervical spine is visualized through the level of T1.    There is no acute fracture identified.  There are multilevel degenerative changes with disc height loss, marginal osteophyte formation and facet arthropathy.  There is no paraspinal hematoma.                                       CT Head Without Contrast (Final result)  Result time 08/31/23 08:14:26      Final result by Stacey Perez MD (08/31/23 08:14:26)                   Impression:      Left frontoparietal lobe hematoma with surrounding edema and small volume intraventricular and subarachnoid hemorrhage.    No significant change from the overnight interpretation.      Electronically signed by: Stacey  Chris  Date:    08/31/2023  Time:    08:14               Narrative:    EXAMINATION:  CT HEAD WITHOUT CONTRAST    CLINICAL HISTORY:  Neuro deficit, acute, stroke suspected;    TECHNIQUE:  Axial scans were obtained from skull base to the vertex.    Coronal and sagittal reconstructions obtained from the axial data.    Automatic exposure control was utilized to limit radiation dose.    Contrast: None    Radiation Dose:    Total DLP: 1661 mGy*cm    COMPARISON:  None    FINDINGS:  A left frontal parietal lobe hematoma measures 4.3 x 4.5 cm in size with surrounding edema.  There is a small amount of interventricular hemorrhage.  Subarachnoid hemorrhage is seen in the left sylvian fissure.    There is mass effect with partial effacement the right lateral ventricle and entrapment of the left temporal horn.  There is no midline shift or herniation.  The basal cisterns are patent.  The calvarium and skull base are intact.  There is a small amount of fluid layering in the left sphenoid sinus.  A left mastoid and middle ear effusion are noted.                                       CTA Head and Neck (xpd) (Final result)  Result time 08/31/23 08:31:54      Final result by Stacey Perez MD (08/31/23 08:31:54)                   Impression:      1. No large vessel occlusion or flow-limiting stenosis.  2. Possible small vascular malformation along the inferior margin of the left cerebral hematoma.  Change to overnight interpretation given to Jair BIGGS at the time of dictation.      Electronically signed by: Stacey Perez  Date:    08/31/2023  Time:    08:31               Narrative:    EXAMINATION:  CTA HEAD AND NECK (XPD)    CLINICAL HISTORY:  Stroke, hemorrhagic;    TECHNIQUE:  Axial images obtained through the cervical region and [place] before and after the administration of intravenous contrast.    Coronal, sagittal, MIP and 3D reconstructions were obtained from the axial data.    Automatic exposure control  was utilized to limit radiation dose.    Radiation Dose:    Total DLP: 1448 mGy*cm    COMPARISON:  CT head dated 08/30/2023    FINDINGS:  Head CT with contrast:    No interval changes when compared to the previous CT.    No enhancing abnormalities.    If present, stenosis of the carotid bulbs is measured based on NASCET criteria,    i.e. area of maximal stenosis compared to the cervical ICA distal to the bulb.    Cervical CTA:    The origins of the great vessels are patent mild calcifications.    The common carotid arteries, carotid bulbs and internal carotid arteries are patent.  There are mild calcifications at the carotid bulbs without hemodynamic significant stenosis.    The vertebral arteries are patent.    Intracranial CTA:    The internal carotid arteries, middle cerebral arteries and anterior cerebral is are patent.    The vertebral arteries, basilar artery and posterior cerebral arteries are patent.    There is an ill-defined area of contrast along the    Additional findings:    There is an ill-defined area of contrast along the inferior aspect of the hematoma measuring 7 mm, with a connecting prominent draining vein (series 17, image 239; series 18, image 24; series 19, image 33).                        Preliminary result by Stacey Perez MD (08/30/23 23:41:55)                   Narrative:    START OF REPORT:  TECHNIQUE: CT ANGIOGRAM OF THE NECK VESSELS WAS PERFORMED WITH INTRAVENOUS CONTRAST WITH DIRECT AXIAL AS WELL AS SAGITTAL AND CORONAL REFORMATIONS.    COMPARISON: NONE.    CLINICAL HISTORY: AMS, CONFUSED, EVAL VASCULAR INJURY.    Findings:  Intracranial Vascular structures:  Internal carotid arteries: Unremarkable.  Middle cerebral arteries: Unremarkable.  Anterior cerebral arteries: Unremarkable.  Vertebral arteries: The left vertebral artery is dominant.  Basilar artery: Unremarkable.  Posterior cerebral arteries: Unremarkable.  Posterior communicating arteries: Unremarkable.  Jugular Veins  and venous sinuses: Unremarkable.  Common carotid arteries:  Right common carotid artery: Moderate of the distal segment of the right common carotid artery is seen with mild stenosis.  Left common carotid artery: Moderate of the distal segment of the left common carotid artery is seen.  Internal carotid artery: Mild atheromatous calcification with stenosis at the origin of the internal carotid artery is seen. Of the cavernous segment of the bilateral internal carotid artery is seen.  Vertebral arteries: Unremarkable.  Jugular Veins and venous sinuses: Unremarkable.  Brain parenchyma: No abnormal intracranial enhancement is seen on the post contrast images.      Impression:  1. No acute angiographic abnormality in the CT angiogram of the head and neck with no aneurysm dissection or occlusion identified. Details and other findings as described above.                                      Telemetry:  [facility] RVR    Physical Exam  Vitals and nursing note reviewed.   Constitutional:       General: She is not in acute distress.     Appearance: She is ill-appearing.   HENT:      Head: Normocephalic.      Mouth/Throat:      Mouth: Mucous membranes are moist.      Pharynx: Oropharynx is clear.   Cardiovascular:      Rate and Rhythm: Tachycardia present. Rhythm irregular.      Heart sounds: No murmur heard.  Pulmonary:      Effort: Pulmonary effort is normal. No respiratory distress.   Skin:     General: Skin is warm and dry.   Neurological:      Comments: Aphasia. Unresponsive. Does not follow commands.        Home Medications:   No current facility-administered medications on file prior to encounter.     No current outpatient medications on file prior to encounter.       Current Inpatient Medications:    Current Facility-Administered Medications:     0.9%  NaCl infusion, , Intravenous, Continuous, Irma Roberts MD, Last Rate: 100 mL/hr at 09/06/23 1049, New Bag at 09/06/23 1049    amiodarone 360 mg/200 mL (1.8 mg/mL)  infusion, 1 mg/min, Intravenous, Continuous, Lety Gracia FNP    amiodarone 360 mg/200 mL (1.8 mg/mL) infusion, 0.5 mg/min, Intravenous, Continuous, Lety Gracia FNP    amiodarone in dextrose 150 mg/100 mL (1.5 mg/mL) loading dose 150 mg, 150 mg, Intravenous, Once, Lety Gracia FNP    digoxin injection 125 mcg, 125 mcg, Intravenous, Q12H PRN, Irma Roberts MD    labetaloL injection 10 mg, 10 mg, Intravenous, Q4H PRN, Ramiro De Santiago DO, 10 mg at 09/05/23 0428    levETIRAcetam tablet 500 mg, 500 mg, Oral, BID, Gokul Mejia MD, 500 mg at 09/06/23 0912    QUEtiapine tablet 50 mg, 50 mg, Oral, BID, Chriss Becerra DO, 50 mg at 09/06/23 0912    sodium chloride 0.9% bolus 1,000 mL 1,000 mL, 1,000 mL, Intravenous, Once, Irma Roberts MD, Last Rate: 999 mL/hr at 09/06/23 1550, 1,000 mL at 09/06/23 1550    VTE Risk Mitigation (From admission, onward)           Ordered     Reason for No Pharmacological VTE Prophylaxis  Once        Question:  Reasons:  Answer:  Risk of Bleeding    08/31/23 1659     IP VTE HIGH RISK PATIENT  Once         08/31/23 1659     Place sequential compression device  Until discontinued         08/31/23 1659                  Assessment:   Atrial Flutter with RVR (New Onset)    - [id number]: 2 (Gender/Age)  Acute Encephalopathy with Intermittent Agitation  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     ELIANA Samson  Cardiology  [facility] - [facility]  09/06/2023 3:54 PM     I have seen the patient, reviewed the Nurse Practitioner's note,  assessment and plan. I have personally interviewed and examined the patient at bedside and agree with the findings. Medical decision making listed above were done under my guidance.

## 2023-09-07 LAB
AV INDEX (PROSTH): 0.7
AV MEAN GRADIENT: 4 MMHG
AV PEAK GRADIENT: 7 MMHG
AV VALVE AREA BY VELOCITY RATIO: 2.26 CM²
AV VALVE AREA: 2.18 CM²
AV VELOCITY RATIO: 0.72
BASOPHILS # BLD AUTO: 0.02 X10(3)/MCL
BASOPHILS NFR BLD AUTO: 0.2 %
BSA FOR ECHO PROCEDURE: 1.64 M2
CV ECHO LV RWT: 0.48 CM
DOP CALC AO PEAK VEL: 1.32 M/S
DOP CALC AO VTI: 21.7 CM
DOP CALC LVOT AREA: 3.1 CM2
DOP CALC LVOT DIAMETER: 2 CM
DOP CALC LVOT PEAK VEL: 0.95 M/S
DOP CALC LVOT STROKE VOLUME: 47.41 CM3
DOP CALC MV VTI: 15.4 CM
DOP CALCLVOT PEAK VEL VTI: 15.1 CM
E WAVE DECELERATION TIME: 143 MSEC
E/A RATIO: 1.07
E/E' RATIO: 4.8 M/S
ECHO LV POSTERIOR WALL: 1.1 CM (ref 0.6–1.1)
EOSINOPHIL # BLD AUTO: 0.07 X10(3)/MCL (ref 0–0.9)
EOSINOPHIL NFR BLD AUTO: 0.6 %
ERYTHROCYTE [DISTWIDTH] IN BLOOD BY AUTOMATED COUNT: 13.5 % (ref 11.5–17)
FRACTIONAL SHORTENING: 33 % (ref 28–44)
HCT VFR BLD AUTO: 38.9 % (ref 37–47)
HGB BLD-MCNC: 13.2 G/DL (ref 12–16)
IMM GRANULOCYTES # BLD AUTO: 0.06 X10(3)/MCL (ref 0–0.04)
IMM GRANULOCYTES NFR BLD AUTO: 0.5 %
INTERVENTRICULAR SEPTUM: 0.9 CM (ref 0.6–1.1)
LEFT ATRIUM SIZE: 3.8 CM
LEFT INTERNAL DIMENSION IN SYSTOLE: 3.1 CM (ref 2.1–4)
LEFT VENTRICLE DIASTOLIC VOLUME INDEX: 58.97 ML/M2
LEFT VENTRICLE DIASTOLIC VOLUME: 97.3 ML
LEFT VENTRICLE MASS INDEX: 96 G/M2
LEFT VENTRICLE SYSTOLIC VOLUME INDEX: 23 ML/M2
LEFT VENTRICLE SYSTOLIC VOLUME: 37.9 ML
LEFT VENTRICULAR INTERNAL DIMENSION IN DIASTOLE: 4.6 CM (ref 3.5–6)
LEFT VENTRICULAR MASS: 158.81 G
LV LATERAL E/E' RATIO: 4 M/S
LV SEPTAL E/E' RATIO: 6 M/S
LVOT MG: 2 MMHG
LVOT MV: 0.62 CM/S
LYMPHOCYTES # BLD AUTO: 0.93 X10(3)/MCL (ref 0.6–4.6)
LYMPHOCYTES NFR BLD AUTO: 7.7 %
MCH RBC QN AUTO: 32.3 PG (ref 27–31)
MCHC RBC AUTO-ENTMCNC: 33.9 G/DL (ref 33–36)
MCV RBC AUTO: 95.1 FL (ref 80–94)
MONOCYTES # BLD AUTO: 1.29 X10(3)/MCL (ref 0.1–1.3)
MONOCYTES NFR BLD AUTO: 10.7 %
MV MEAN GRADIENT: 1 MMHG
MV PEAK A VEL: 0.56 M/S
MV PEAK E VEL: 0.6 M/S
MV PEAK GRADIENT: 2 MMHG
MV STENOSIS PRESSURE HALF TIME: 47 MS
MV VALVE AREA BY CONTINUITY EQUATION: 3.08 CM2
MV VALVE AREA P 1/2 METHOD: 4.68 CM2
NEUTROPHILS # BLD AUTO: 9.72 X10(3)/MCL (ref 2.1–9.2)
NEUTROPHILS NFR BLD AUTO: 80.3 %
NRBC BLD AUTO-RTO: 0 %
PISA TR MAX VEL: 2.62 M/S
PLATELET # BLD AUTO: 161 X10(3)/MCL (ref 130–400)
PMV BLD AUTO: 10.6 FL (ref 7.4–10.4)
PV PEAK GRADIENT: 4 MMHG
PV PEAK VELOCITY: 0.95 M/S
RBC # BLD AUTO: 4.09 X10(6)/MCL (ref 4.2–5.4)
RIGHT VENTRICULAR END-DIASTOLIC DIMENSION: 2.8 CM
TDI LATERAL: 0.15 M/S
TDI SEPTAL: 0.1 M/S
TDI: 0.13 M/S
TR MAX PG: 27 MMHG
TRICUSPID ANNULAR PLANE SYSTOLIC EXCURSION: 2.79 CM
WBC # SPEC AUTO: 12.09 X10(3)/MCL (ref 4.5–11.5)
Z-SCORE OF LEFT VENTRICULAR DIMENSION IN END DIASTOLE: -0.07
Z-SCORE OF LEFT VENTRICULAR DIMENSION IN END SYSTOLE: 0.61

## 2023-09-07 PROCEDURE — 27000221 HC OXYGEN, UP TO 24 HOURS

## 2023-09-07 PROCEDURE — 97530 THERAPEUTIC ACTIVITIES: CPT | Mod: CQ

## 2023-09-07 PROCEDURE — 85025 COMPLETE CBC W/AUTO DIFF WBC: CPT

## 2023-09-07 PROCEDURE — 11000001 HC ACUTE MED/SURG PRIVATE ROOM

## 2023-09-07 PROCEDURE — 25000003 PHARM REV CODE 250: Performed by: INTERNAL MEDICINE

## 2023-09-07 PROCEDURE — 63600175 PHARM REV CODE 636 W HCPCS

## 2023-09-07 PROCEDURE — 25000003 PHARM REV CODE 250: Performed by: NURSE PRACTITIONER

## 2023-09-07 PROCEDURE — 21400001 HC TELEMETRY ROOM

## 2023-09-07 PROCEDURE — 25000003 PHARM REV CODE 250: Performed by: STUDENT IN AN ORGANIZED HEALTH CARE EDUCATION/TRAINING PROGRAM

## 2023-09-07 RX ORDER — METOPROLOL TARTRATE 25 MG/1
25 TABLET, FILM COATED ORAL 2 TIMES DAILY
Status: DISCONTINUED | OUTPATIENT
Start: 2023-09-07 | End: 2023-09-08

## 2023-09-07 RX ADMIN — SODIUM CHLORIDE: 9 INJECTION, SOLUTION INTRAVENOUS at 02:09

## 2023-09-07 RX ADMIN — METOPROLOL TARTRATE 25 MG: 25 TABLET, FILM COATED ORAL at 02:09

## 2023-09-07 RX ADMIN — SODIUM CHLORIDE: 9 INJECTION, SOLUTION INTRAVENOUS at 09:09

## 2023-09-07 RX ADMIN — LEVETIRACETAM 500 MG: 500 TABLET, FILM COATED ORAL at 09:09

## 2023-09-07 RX ADMIN — QUETIAPINE FUMARATE 50 MG: 25 TABLET ORAL at 09:09

## 2023-09-07 RX ADMIN — METOPROLOL TARTRATE 25 MG: 25 TABLET, FILM COATED ORAL at 08:09

## 2023-09-07 RX ADMIN — LEVETIRACETAM 500 MG: 500 TABLET, FILM COATED ORAL at 08:09

## 2023-09-07 RX ADMIN — AMIODARONE HYDROCHLORIDE 0.5 MG/MIN: 1.8 INJECTION, SOLUTION INTRAVENOUS at 09:09

## 2023-09-07 RX ADMIN — QUETIAPINE FUMARATE 50 MG: 25 TABLET ORAL at 08:09

## 2023-09-07 NOTE — PROGRESS NOTES
[facility] - 4th Floor Medical Telemetry    Cardiology  Progress Note    Patient Name: Bozena Shelton  MRN: [id number]  Admission Date: 8/30/2023  Hospital Length of Stay: 8 days  Code Status: DNR   Attending Physician: Dev Diaz MD   Primary Care Physician: Shannan, Primary Doctor  Expected Discharge Date:   Principal Problem:<principal problem not specified>    Subjective:   Consultation Reason: AFL RVR     HPI:   Ms. Shelton is a 73 year old female, unknown to [facility], who presented to the hospital status post fall at home. Her  reported that she slipped off the edge of the bed. He caught her. He denied patient had LOC or head trauma. EMS reported patient was confused on scene with slurred speech and right-sided facial droop.  Patient's last known normal was 2100 on 08/30/2023.  CT head revealed intraparenchymal hematoma in the left temporal lobe with intraventricular extension and subarachnoid hemorrhage of the left temporal lobe in the left sylvian fissure.  CTA head and neck revealed possible vascular malformation involving the inferior margin of the left posterior temporal intraparenchymal hemorrhage. Patient noted to be tachycardic since arrival. On 9.6.23 patient noted to be AFL RVR. [facility] is consulted for AFL Management.    Hospital Course:   9.7.23: NAD Noted. Patient is in SR. On Amiodarone Infusion at 0.5 Mg/Min. Intermittent bouts of AFL RVR. BP Stable. She is sleeping/obtunded from neurological standpoint. NG Tube in place.      PMH: Unable to Obtain  PSH: Unable to Obtain  Family History: Unable to Obtain  Social History: Unable to Obtain     Previous Cardiac Diagnostics:   No Cardiac Diagnostics on File.     Review of patient's allergies indicates:  Not on File    Review of Systems   Unable to perform ROS: Acuity of condition     Objective:     Vital Signs (Most Recent):  Temp: 98.7 °F (37.1 °C) (09/07/23 1020)  Pulse: 99 (09/07/23 1020)  Resp: 18 (09/07/23 1020)  BP: 126/72  (09/07/23 1020)  SpO2: (!) 93 % (09/07/23 1020) Vital Signs (24h Range):  Temp:  [98.5 °F (36.9 °C)-99.3 °F (37.4 °C)] 98.7 °F (37.1 °C)  Pulse:  [] 99  Resp:  [14-20] 18  SpO2:  [77 %-99 %] 93 %  BP: (103-132)/(57-82) 126/72     Weight: 58.8 kg (129 lb 10.1 oz)  Body mass index is 21.57 kg/m².    SpO2: (!) 93 %         Intake/Output Summary (Last 24 hours) at 9/7/2023 1238  Last data filed at 9/7/2023 0900  Gross per 24 hour   Intake 4611 ml   Output 750 ml   Net 3861 ml       Lines/Drains/Airways       Drain  Duration                  NG/OG Tube 09/03/23 1752 nasogastric 18 Fr. Right nostril 3 days              Peripheral Intravenous Line  Duration                  Peripheral IV - Single Lumen 09/04/23 1200 20 G Posterior;Right Hand 3 days         Peripheral IV - Single Lumen 09/06/23 1405 20 G Anterior;Left;Proximal Forearm <1 day                    Significant Labs:   Recent Results (from the past 72 hour(s))   Comprehensive Metabolic Panel    Collection Time: 09/05/23  2:07 AM   Result Value Ref Range    Sodium Level 138 136 - 145 mmol/L    Potassium Level 3.5 3.5 - 5.1 mmol/L    Chloride 101 98 - 107 mmol/L    Carbon Dioxide 27 23 - 31 mmol/L    Glucose Level 134 (H) 82 - 115 mg/dL    Blood Urea Nitrogen 11.9 9.8 - 20.1 mg/dL    Creatinine 0.52 (L) 0.55 - 1.02 mg/dL    Calcium Level Total 8.7 8.4 - 10.2 mg/dL    Protein Total 6.1 5.8 - 7.6 gm/dL    Albumin Level 3.2 (L) 3.4 - 4.8 g/dL    Globulin 2.9 2.4 - 3.5 gm/dL    Albumin/Globulin Ratio 1.1 1.1 - 2.0 ratio    Bilirubin Total 1.0 <=1.5 mg/dL    Alkaline Phosphatase 77 40 - 150 unit/L    Alanine Aminotransferase 32 0 - 55 unit/L    Aspartate Aminotransferase 34 5 - 34 unit/L    eGFR >60 mls/min/1.73/m2   Magnesium    Collection Time: 09/05/23  2:07 AM   Result Value Ref Range    Magnesium Level 1.60 1.60 - 2.60 mg/dL   Phosphorus    Collection Time: 09/05/23  2:07 AM   Result Value Ref Range    Phosphorus Level 1.9 (L) 2.3 - 4.7 mg/dL   CBC with  Differential    Collection Time: 09/05/23  2:07 AM   Result Value Ref Range    WBC 11.96 (H) 4.50 - 11.50 x10(3)/mcL    RBC 4.70 4.20 - 5.40 x10(6)/mcL    Hgb 14.9 12.0 - 16.0 g/dL    Hct 43.3 37.0 - 47.0 %    MCV 92.1 80.0 - 94.0 fL    MCH 31.7 (H) 27.0 - 31.0 pg    MCHC 34.4 33.0 - 36.0 g/dL    RDW 13.2 11.5 - 17.0 %    Platelet 153 130 - 400 x10(3)/mcL    MPV 10.3 7.4 - 10.4 fL    Neut % 86.0 %    Lymph % 5.5 %    Mono % 7.8 %    Eos % 0.1 %    Basophil % 0.3 %    Lymph # 0.66 0.6 - 4.6 x10(3)/mcL    Neut # 10.29 (H) 2.1 - 9.2 x10(3)/mcL    Mono # 0.93 0.1 - 1.3 x10(3)/mcL    Eos # 0.01 0 - 0.9 x10(3)/mcL    Baso # 0.03 <=0.2 x10(3)/mcL    IG# 0.04 0 - 0.04 x10(3)/mcL    IG% 0.3 %    NRBC% 0.0 %   Comprehensive Metabolic Panel    Collection Time: 09/06/23  2:27 AM   Result Value Ref Range    Sodium Level 133 (L) 136 - 145 mmol/L    Potassium Level 3.6 3.5 - 5.1 mmol/L    Chloride 99 98 - 107 mmol/L    Carbon Dioxide 26 23 - 31 mmol/L    Glucose Level 122 (H) 82 - 115 mg/dL    Blood Urea Nitrogen 11.9 9.8 - 20.1 mg/dL    Creatinine 0.50 (L) 0.55 - 1.02 mg/dL    Calcium Level Total 8.5 8.4 - 10.2 mg/dL    Protein Total 5.9 5.8 - 7.6 gm/dL    Albumin Level 3.0 (L) 3.4 - 4.8 g/dL    Globulin 2.9 2.4 - 3.5 gm/dL    Albumin/Globulin Ratio 1.0 (L) 1.1 - 2.0 ratio    Bilirubin Total 0.9 <=1.5 mg/dL    Alkaline Phosphatase 75 40 - 150 unit/L    Alanine Aminotransferase 34 0 - 55 unit/L    Aspartate Aminotransferase 31 5 - 34 unit/L    eGFR >60 mls/min/1.73/m2   Magnesium    Collection Time: 09/06/23  2:27 AM   Result Value Ref Range    Magnesium Level 1.90 1.60 - 2.60 mg/dL   Phosphorus    Collection Time: 09/06/23  2:27 AM   Result Value Ref Range    Phosphorus Level 2.5 2.3 - 4.7 mg/dL   CBC with Differential    Collection Time: 09/06/23  2:27 AM   Result Value Ref Range    WBC 13.06 (H) 4.50 - 11.50 x10(3)/mcL    RBC 4.58 4.20 - 5.40 x10(6)/mcL    Hgb 14.4 12.0 - 16.0 g/dL    Hct 42.5 37.0 - 47.0 %    MCV 92.8 80.0 -  94.0 fL    MCH 31.4 (H) 27.0 - 31.0 pg    MCHC 33.9 33.0 - 36.0 g/dL    RDW 13.4 11.5 - 17.0 %    Platelet 153 130 - 400 x10(3)/mcL    MPV 10.6 (H) 7.4 - 10.4 fL    Neut % 82.0 %    Lymph % 7.1 %    Mono % 10.1 %    Eos % 0.1 %    Basophil % 0.2 %    Lymph # 0.93 0.6 - 4.6 x10(3)/mcL    Neut # 10.71 (H) 2.1 - 9.2 x10(3)/mcL    Mono # 1.32 (H) 0.1 - 1.3 x10(3)/mcL    Eos # 0.01 0 - 0.9 x10(3)/mcL    Baso # 0.03 <=0.2 x10(3)/mcL    IG# 0.06 (H) 0 - 0.04 x10(3)/mcL    IG% 0.5 %    NRBC% 0.0 %   CBC with Differential    Collection Time: 09/07/23  4:45 AM   Result Value Ref Range    WBC 12.09 (H) 4.50 - 11.50 x10(3)/mcL    RBC 4.09 (L) 4.20 - 5.40 x10(6)/mcL    Hgb 13.2 12.0 - 16.0 g/dL    Hct 38.9 37.0 - 47.0 %    MCV 95.1 (H) 80.0 - 94.0 fL    MCH 32.3 (H) 27.0 - 31.0 pg    MCHC 33.9 33.0 - 36.0 g/dL    RDW 13.5 11.5 - 17.0 %    Platelet 161 130 - 400 x10(3)/mcL    MPV 10.6 (H) 7.4 - 10.4 fL    Neut % 80.3 %    Lymph % 7.7 %    Mono % 10.7 %    Eos % 0.6 %    Basophil % 0.2 %    Lymph # 0.93 0.6 - 4.6 x10(3)/mcL    Neut # 9.72 (H) 2.1 - 9.2 x10(3)/mcL    Mono # 1.29 0.1 - 1.3 x10(3)/mcL    Eos # 0.07 0 - 0.9 x10(3)/mcL    Baso # 0.02 <=0.2 x10(3)/mcL    IG# 0.06 (H) 0 - 0.04 x10(3)/mcL    IG% 0.5 %    NRBC% 0.0 %       Telemetry:  Sinus Rhythm     Physical Exam  Vitals and nursing note reviewed.   Constitutional:       General: She is not in acute distress.     Appearance: She is ill-appearing.   HENT:      Head: Normocephalic.   Cardiovascular:      Rate and Rhythm: Normal rate and regular rhythm.      Heart sounds: Normal heart sounds.      Comments: Sinus Rhythm  Pulmonary:      Effort: Pulmonary effort is normal. No respiratory distress.      Comments: NC Oxygen  Abdominal:      Comments: NG Tube   Musculoskeletal:      Cervical back: Neck supple.      Right lower leg: No edema.      Left lower leg: No edema.   Skin:     General: Skin is warm and dry.   Neurological:      Comments: Lethargic to Obtunded. Currently  Sleeping, Awakens Eyes with Verbal Stimuli. Right Sided Paresis.        Current Inpatient Medications:    Current Facility-Administered Medications:     0.9%  NaCl infusion, , Intravenous, Continuous, Irma Roberts MD, Last Rate: 100 mL/hr at 09/07/23 0254, New Bag at 09/07/23 0254    amiodarone 360 mg/200 mL (1.8 mg/mL) infusion, 0.5 mg/min, Intravenous, Continuous, Lety Gracia FNP, Last Rate: 16.7 mL/hr at 09/07/23 0951, 0.5 mg/min at 09/07/23 0951    labetaloL injection 10 mg, 10 mg, Intravenous, Q4H PRN, Ramiro De Santiago DO, 10 mg at 09/05/23 0428    levETIRAcetam tablet 500 mg, 500 mg, Oral, BID, Gokul Mejia MD, 500 mg at 09/07/23 0950    metoprolol injection 5 mg, 5 mg, Intravenous, Q4H PRN, Melanie Calderon FNP, 5 mg at 09/06/23 2256    QUEtiapine tablet 50 mg, 50 mg, Oral, BID, Chriss Becerra DO, 50 mg at 09/07/23 0949    VTE Risk Mitigation (From admission, onward)           Ordered     Reason for No Pharmacological VTE Prophylaxis  Once        Question:  Reasons:  Answer:  Risk of Bleeding    08/31/23 1659     IP VTE HIGH RISK PATIENT  Once         08/31/23 1659     Place sequential compression device  Until discontinued         08/31/23 1659                  Assessment:   Atrial Flutter with RVR (New Onset)- Now Sinus Rhythm on Amiodarone Infusion    - [id number]: 2 (Gender/Age)    - Not on Anticoagulation in the Setting of Hemorrhagic CVA    - TSH Normal/Troponin Normal  Acute Encephalopathy with Intermittent Agitation  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 Bertrand who agrees with plan as  outlined above.     ELIANA Samson  Cardiology  [facility] - 4th Floor Medical Telemetry  09/07/2023     I have reviewed the Nurse Practitioner's note, assessment and plan. Medical decision making listed above were done under my guidance.    Plan:  Patient is now back in NSR, continue IV amio until tomorrow.  Will switch to oral amio in AM if HR allows  Will start BB per NGT  Check echo to assess LVEF

## 2023-09-07 NOTE — PROGRESS NOTES
Inpatient Nutrition Assessment    Admit Date: 8/30/2023   Total duration of encounter: 8 days     Nutrition Recommendation/Prescription     Continue tube feeding as tolerated.    Formula: Isosource HN  Rate/Volume: 75 ml/hr  Water Flushes: 50 ml every 4 hours  Additives/Modulars: none at this time  Route: nasogastric tube  Method: continuous  Total Nutrition Provided by Tube Feeding, Additives, and Flushes:  Calories Provided  1800 kcal/d, 104% needs   Protein Provided  81 g/d, 108% needs   Fluid Provided  1465 ml/d, 85% needs   Continuous feeding calculations based on estimated 20 hr/d run time unless otherwise stated.    Increase water flush to 25 ml/hr when IV fluid discontinued to meet estimated fluid requirements.    Communication of Recommendations: reviewed with nurse    Nutrition Assessment     Malnutrition Assessment/Nutrition-Focused Physical Exam    Malnutrition Context: chronic illness  Malnutrition Level: severe  Energy Intake (Malnutrition):  (unable to eval)  Weight Loss (Malnutrition):  (unable to eval)  Subcutaneous Fat (Malnutrition): severe depletion  Orbital Region (Subcutaneous Fat Loss): severe depletion  Upper Arm Region (Subcutaneous Fat Loss): severe depletion     Muscle Mass (Malnutrition): severe depletion  [place] Region (Muscle Loss): severe depletion  Clavicle Bone Region (Muscle Loss): severe depletion  Clavicle and Acromion Bone Region (Muscle Loss): severe depletion                 Fluid Accumulation (Malnutrition):  (does not meet criteria)        A minimum of two characteristics is recommended for diagnosis of either severe or non-severe malnutrition.    Chart Review    Reason Seen: continuous nutrition monitoring, physician consult for assess dietary needs, and follow-up    Malnutrition Screening Tool Results    Not available.            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 draining into the superior sagittal sinus  Sinus tachycardia to dehydration  Borderline low blood pressure   Hypoxic respiratory failure on 2 L of oxygen  Elevated leukocytosis, trending upwards  Oropharyngeal dysphagia on NG tube feeds     Relevant Medical History: no known hx    Scheduled Meds:   levETIRAcetam  500 mg Oral BID    metoprolol tartrate  25 mg Per NG tube BID    QUEtiapine  50 mg Oral BID     Continuous Infusions:   sodium chloride 0.9% 100 mL/hr at 09/07/23 0254    amiodarone in dextrose 5% 0.5 mg/min (09/07/23 0951)     Calorie Containing IV Medications: no significant kcals from medications at this time    Recent Labs   Lab 09/01/23  0219 09/01/23  0407 09/01/23  2343 09/02/23  0341 09/02/23  0828 09/02/23  1144 09/03/23  0211 09/04/23  0222 09/05/23  0207 09/06/23 0227   *   < > 143 144  145 149* 149* 147* 141 138 133*   K 3.2*  --  3.0* 2.9*  --   --  3.5 3.5 3.5 3.6   CALCIUM 8.2*  --  8.0* 7.7*  --   --  8.7 8.4 8.7 8.5   PHOS 2.2*  --  1.3*  --   --   --  1.7* 1.9* 1.9* 2.5   MG 1.60  --  1.80  --   --   --  1.80 1.80 1.60 1.90   CHLORIDE 106  --  115* 117*  --   --  114* 108* 101 99   CO2 20*  --  20* 19*  --   --  21* 22* 27 26   BUN 4.0*  --  3.8* 3.5*  --   --  9.1* 10.1 11.9 11.9   CREATININE 0.47*  --  0.46* 0.47*  --   --  0.50* 0.44* 0.52* 0.50*   GLUCOSE 121*  --  114 89  --   --  109 107 134* 122*   BILITOT 0.9  --  0.5  --   --   --  1.3 1.2 1.0 0.9   ALKPHOS 64  --  54  --   --   --  57 64 77 75   ALT 17  --  16  --   --   --  24 28 32 34   AST 33  --  29  --   --   --  39* 40* 34 31   ALBUMIN 3.6  --  2.9*  --   --   --  3.2* 3.2* 3.2* 3.0*   HGBA1C 5.4  --   --   --   --   --   --   --   --   --     < > = values in this interval not displayed.     Diet/PN Order: No diet orders on file  Oral Supplement Order: none  Tube Feeding Order:  Isosource HN at 75 ml/hr  "(see below for calculation)  Appetite/Oral Intake: NPO/not applicable  Factors Affecting Nutritional Intake: impaired cognitive status/motor control, difficulty/impaired swallowing, and NPO  Food/Scientologist/Cultural Preferences: unable to obtain  Food Allergies: none reported    Wound(s):  suspected hospital acquired purple/maroon altered skin integrity to sacrum per documentation    Comments    23: Discussed with RN. Will provide tube feeding recommendations for when appropriate to start tube feeding. Receiving kcal from meds. Possible plans for extubation post procedure today. Will likely place NG prior to extubation per RN.     9/3/23: Pt now extubated. Plans for starting TF per RN. NG to be placed. Pt not able to verify subjective info due to mental status.     23 Tube feeding at goal.    Anthropometrics    Height: 5' 5" (165.1 cm) Height Method: Estimated  Last Weight: 58.8 kg (129 lb 10.1 oz) (23) Weight Method: Bed Scale  BMI (Calculated): 21.6  BMI Classification: underweight (BMI less than 22 if >65 years of age)     Ideal Body Weight (IBW), Female: 125 lb     % Ideal Body Weight, Female (lb): 103.7 %                             Usual Weight Provided By: unable to obtain usual weight    Wt Readings from Last 5 Encounters:   23 58.8 kg (129 lb 10.1 oz)     Weight Change(s) Since Admission: +8.8 kg x 1 week, likely error  Wt Readings from Last 1 Encounters:   23 58.8 kg (129 lb 10.1 oz)   23 50 kg (110 lb 3.7 oz)   Admit Weight: 50 kg (110 lb 3.7 oz) (23)    Estimated Needs    Weight Used For Calorie Calculations: 50 kg (110 lb 3.7 oz)  Energy Calorie Requirements (kcal): 1730kcal (1.2 stress factor +500kcal)  Energy Need Method: [facility]  Weight Used For Protein Calculations: 50 kg (110 lb 3.7 oz)  Protein Requirements: 75gm (1.5g/kg)  Fluid Requirements (mL): 1730ml (1ml/kcal)  Temp (24hrs), Av.9 °F (37.2 °C), Min:98.5 °F (36.9 °C), " Max:99.3 °F (37.4 °C)  Vtot (L/Min) for [place] [employer] Equation Calculation: 7.1    Enteral Nutrition    Formula: Isosource HN  Rate/Volume: 75 ml/hr  Water Flushes: 50 ml every 4 hours  Additives/Modulars: none at this time  Route: nasogastric tube  Method: continuous  Total Nutrition Provided by Tube Feeding, Additives, and Flushes:  Calories Provided  1800 kcal/d, 104% needs   Protein Provided  81 g/d, 108% needs   Fluid Provided  1465 ml/d, 85% needs   Continuous feeding calculations based on estimated 20 hr/d run time unless otherwise stated.    Parenteral Nutrition Patient not receiving parenteral nutrition support at this time.    Evaluation of Received Nutrient Intake    Calories: meeting estimated needs  Protein: meeting estimated needs    Patient Education Not applicable.    Nutrition Diagnosis     PES: Malnutrition related to chronic illness as evidenced by muscle wasting, fat loss. (continues)    Interventions/Goals     Intervention(s): modified composition of enteral nutrition, modified rate of enteral nutrition, and collaboration with other providers  Goal: Meet greater than 75% of nutritional needs by follow-up. (goal met)    Monitoring & Evaluation     Dietitian will monitor energy intake, enteral nutrition intake, weight, electrolyte/renal panel, glucose/endocrine profile, and gastrointestinal profile.  Nutrition Risk/Follow-Up: high (follow-up in 1-4 days)   Please consult if re-assessment needed sooner.

## 2023-09-07 NOTE — PT/OT/SLP PROGRESS
Physical Therapy Treatment    Patient Name:  Bozena Shelton   MRN:  [id number]    Recommendations:     Discharge Recommendations:  LTACH (long-term acute care hospital), nursing facility, skilled   Discharge Equipment Recommendations: to be determined by next level of care     Subjective     Patient restless, agitated, and impulsive.     Objective:     General Precautions: Standard, aphasia, aspiration, fall, seizure, other (see comments) (BP<140/90)   Orthopedic Precautions:    Braces:    Respiratory Status: Nasal cannula, flow . L/min  Communicated with nurse prior to treatment.     Functional Mobility:    Rolling:Total Assistance  Supine to sit:Total Assistance  Sit to stand transfer: Activity did not occur  Bed to chair transfer:Activity did not occur    /70. Pt sat EOB with max assist to maintain sit balance and limited participation. Attempted static sit balance but unsuccessful midline and max/total assist to maintain. Pt cleansed, gown changed, placed new purwic and left side lying after session. Roll belt also secured with bed alarm.     Education Provided:  Role and goals of PT, transfer training, bed mobility, gait training, balance training, safety awareness, assistive device, strengthening exercises, and importance of participating in PT to return to PLOF.         Skin Integrity: Visible skin intact    PT interventions performed during the course of today's session in an effort to prevent and/or reduce acquired pressure injuries:   Therapeutic positioning completed       GOALS:   Multidisciplinary Problems       Physical Therapy Goals          Problem: Physical Therapy    Goal Priority Disciplines Outcome Goal Variances Interventions   Physical Therapy Goal     PT, PT/OT Ongoing, Progressing     Description: Goals to be met by: 10/5/2023     Patient will increase functional independence with mobility by performin. Supine to sit with MInimal Assistance  2. Sit to stand transfer with Minimal  Assistance  3. Bed to chair transfer with Minimal Assistance using LRAD  4. Gait  x 50 feet with Minimal Assistance using LRAD.  5. Pt will sit EOB for 5 min with SBA.                           Assessment:     Bozena Shelton is a 73 y.o. female admitted with a medical diagnosis of <principal problem not specified>.     Patient Active Problem List   Diagnosis    Intracerebral hemorrhage    AVM (arteriovenous malformation) brain    Congenital AV fistula of brain    Hemorrhagic cerebrovascular accident (CVA)        Rehab Prognosis: Poor; patient would benefit from acute skilled PT services to address these deficits and reach maximum level of function.    Recent Surgery: * No surgery found *      Plan:     During this hospitalization, patient to be seen 5 x/week to address the identified rehab impairments via gait training, therapeutic activities, therapeutic exercises, wheelchair management/training and progress toward the following goals:    Plan of Care Expires:  10/05/23    Billable Minutes     Billable Minutes: Therapeutic Activity 26    Treatment Type: Treatment  PT/PTA: PTA     Number of PTA visits since last PT visit: 2     09/07/2023

## 2023-09-07 NOTE — PROGRESS NOTES
[facility] Hospital Medicine Progress Note        Chief Complaint: Inpatient Follow-up for     HPI: 73 y.o. female without significant past medical history presented to [facility] on 8/30/2023 following a fall at home.   reported patient fell off the edge of the bed, but was caught by him.   denied LOC or head trauma.  EMS reported patient was confused on scene with slurred speech and right-sided facial droop.  Patient's last known normal was 2100 on 08/30/2023.  CT head revealed intraparenchymal hematoma in the left temporal lobe with intraventricular extension and subarachnoid hemorrhage of the left temporal lobe in the left sylvian fissure.  CTA head and neck revealed possible vascular malformation involving the inferior margin of the left posterior temporal intraparenchymal hemorrhage.  Neurology and Neurosurgery were consulted.  Patient was admitted to ICU for close monitoring. Patient was started on hypertonic saline at 50 cc/hour with sodium goal of 145, Keppra 500 mg BID, and Cleviprex for BP parameters below 140/90.  Patient was intubated and underwent diagnostic cerebral angiogram with Interventional Neurologist Dr. 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  CT head on 08/31 revealed no significant changes.  Embolization of the left middle meningeal artery arteriovenous fistula was unsuccessful secondary to tortuosity on 09/01 by Dr. South.  Cleviprex and hypertonic saline were discontinued on 09/02 with placement of feeding tube. Patient was extubated on 09/03 interventional neurology recommended AVM embolization in 3-4 weeks after the ICH has had time to resolve.  Patient was placed in restraints and started on Precedex for agitation.  Precedex was weaned and patient was started on Seroquel 50 mg for agitation on 9/04. Patient was cleared for downgrade out of ICU on  09/05. Hospital medicine was consulted for transition of care and further medical management.  Patient has a NG tube    Interval Hx:   Patient seen and examined this morning with significant other bedside who is really concerned about patient having a surgery  Case was discussed with patient's nurse and  on the floor.    Objective/physical exam:  General: In no acute distress, afebrile  Chest: Clear to auscultation bilaterally  Heart: RRR, +S1, S2, no appreciable murmur  Abdomen: Soft, nontender, BS +  MSK: Warm,   Neurologic:  Sleepy opens her eyes  VITAL SIGNS: 24 HRS MIN & MAX LAST   Temp  Min: 98.5 °F (36.9 °C)  Max: 99.3 °F (37.4 °C) 99.2 °F (37.3 °C)   BP  Min: 103/76  Max: 132/76 120/71   Pulse  Min: 90  Max: 154  90   Resp  Min: 14  Max: 24 18   SpO2  Min: 77 %  Max: 99 % 98 %     I have reviewed the following labs:  Recent Labs   Lab 09/05/23 0207 09/06/23 0227 09/07/23  0445   WBC 11.96* 13.06* 12.09*   RBC 4.70 4.58 4.09*   HGB 14.9 14.4 13.2   HCT 43.3 42.5 38.9   MCV 92.1 92.8 95.1*   MCH 31.7* 31.4* 32.3*   MCHC 34.4 33.9 33.9   RDW 13.2 13.4 13.5    153 161   MPV 10.3 10.6* 10.6*     Recent Labs   Lab 08/31/23  1510 09/01/23  0007 09/01/23  1542 09/01/23  1552 09/02/23  0945 09/02/23  1144 09/04/23  0222 09/05/23 0207 09/06/23 0227   NA  --    < >  --    < >  --    < > 141 138 133*   K  --    < >  --    < >  --    < > 3.5 3.5 3.6   CO2  --    < >  --    < >  --    < > 22* 27 26   BUN  --    < >  --    < >  --    < > 10.1 11.9 11.9   CREATININE  --    < >  --    < >  --    < > 0.44* 0.52* 0.50*   CALCIUM  --    < >  --    < >  --    < > 8.4 8.7 8.5   PH 7.430  --  7.440  --  7.430  --   --   --   --    MG  --    < >  --    < >  --    < > 1.80 1.60 1.90   ALBUMIN  --    < >  --    < >  --    < > 3.2* 3.2* 3.0*   ALKPHOS  --    < >  --    < >  --    < > 64 77 75   ALT  --    < >  --    < >  --    < > 28 32 34   AST  --    < >  --    < >  --    < > 40* 34 31   BILITOT  --    < >  --     < >  --    < > 1.2 1.0 0.9    < > = values in this interval not displayed.     Microbiology Results (last 7 days)       ** No results found for the last 168 hours. 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 seizure prophylaxis Seroquel  Neurosurgery and intervention neurology following they are planning for maybe AVM embolization but  has a lot of concerns discussed with Neurosurgery today myself and they will go and discuss with the family  White cell count is stable will happen keep a close watch  We will see how patient does with speech therapy since he is slightly more awake if not then might need a feeding tube   Discussed with patient's  he is agreeable to feeding tube at this time   In the meantime continue with PT OT and ST   Cardiology following  Continue with amiodarone infusion started on beta-blocker 2D echo ordered      Critical care note:  Critical care diagnosis:  Atrial flutter with RVR on amiodarone infusion  Critical care interventions: Hands-on evaluation, review of  labs/radiographs/records and discussion with patient and family if present  Critical care time spent: 35 minutes   VTE prophylaxis:     Patient condition:  Stable/Fair/Guarded/ Serious/ Critical    Anticipated discharge and Disposition:         All diagnosis and differential diagnosis have been reviewed; assessment and plan has been documented; I have personally reviewed the labs and test results that are presently available; I have reviewed the patients medication list; I have reviewed the consulting providers response and recommendations. I have reviewed or attempted to review medical records based upon their availability    All of the patient's questions have been  addressed and answered. Patient's is agreeable to the above stated plan. I will continue to monitor closely and make adjustments to medical management as needed.  _____________________________________________________________________    Nutrition Status:    Radiology:  I have personally reviewed the following imaging and agree with the radiologist.     XR Gastric tube check, non-radiologist performed  Narrative: EXAMINATION:  XR GASTRIC TUBE CHECK, NON-RADIOLOGIST PERFORMED    CLINICAL HISTORY:  ng tube;    COMPARISON:  None    FINDINGS:  Frontal image of the upper abdomen.  Enteric tube tip near the expected area of the GE junction.  Suggest advancing 5 cm if possible.  Impression: As above.    Electronically signed by: Roni Sierra  Date:    09/03/2023  Time:    18:23  CT Head Without Contrast  Narrative: EXAMINATION:  CT HEAD WITHOUT CONTRAST    CLINICAL HISTORY:  Stroke, follow up;    TECHNIQUE:  Axial scans were obtained from skull base to the vertex.    Coronal and sagittal reconstructions obtained from the axial data.    Automatic exposure control was utilized to limit radiation dose.    Contrast: None    Radiation Dose:    Total DLP: 1045 mGy*cm    COMPARISON:  CT head dated 08/31/2023    FINDINGS:  There is stable size of the left cerebral  parenchymal hemorrhage with surrounding edema.  There is a small amount of interventricular hemorrhage layering in the occipital horns.  Small volume of surrounding subarachnoid hemorrhage has overall decreased.    There is stable mass effect with partial effacement the left lateral ventricle.  The ventricles are stable in size with mild dilatation of the left temporal horn.  There is no midline shift or herniation.  The basal cisterns are patent.  The calvarium and skull base are intact.  Impression: Decreasing volume of subarachnoid hemorrhage with otherwise stable exam.    No major change from the [facility] interpretation.    Electronically signed by: Stacey Perez  Date:    09/03/2023  Time:    08:50      Shima Omer MD   09/07/2023

## 2023-09-07 NOTE — NURSING
Nurses Note -- 4 Eyes      9/6/2023   9:16 PM      Skin assessed during: Transfer      [] No Altered Skin Integrity Present    []Prevention Measures Documented      [x] Yes- Altered Skin Integrity Present or Discovered   [x] LDA Added if Not in Epic (Describe Wound)   [] New Altered Skin Integrity was Present on Admit and Documented in LDA   [x] Wound Image Taken    Wound Care Consulted? Yes    Attending Nurse:  KALYAN Kay    Second RN/Staff Member:  OLIVERIO Miranda RN.

## 2023-09-07 NOTE — PLAN OF CARE
Email sent to Vandana CRAMER at [facility] to get update on patient. Vandana responded that she was waiting for approval from her director to submit for authorization. Patient not medically stable for discharge.

## 2023-09-07 NOTE — PROGRESS NOTES
09/07/23 1531   Missed Time Reason   SLP Attempted Eval Date 09/07/23   SLP Attempted Eval Time 1525   Missed Treatment Reason Patient fatigue;Unarousable     SLP will re-attempt tomorrow.

## 2023-09-07 NOTE — PROGRESS NOTES
[facility] 4th Floor Medical Telemetry  Neurosurgery  Progress Note    Subjective:     Interval History:  No acute events overnight.  Patient continues to be encephalopathic.  Eyes are open but does not follow commands.  Vital signs are stable currently but patient with tachycardia during the night per 's report.  Patient continues with aphasia and right-sided deficits.  She will require a PEG tube placed.   has made patient DNR.   stated that patient would not want to undergo brain surgery and he will be considering his options.  He is unsure if he wants to proceed given her quality of life currently.          History of Present Illness: 73 y.o. female without significant past medical history presented to [facility] on 8/30/2023 following a fall at home.   reported patient fell off the edge of the bed, but was caught by him.   denied LOC or head trauma.  EMS reported patient was confused on scene with slurred speech and right-sided facial droop.  Patient's last known normal was 2100 on 08/30/2023.  CT head revealed intraparenchymal hematoma in the left temporal lobe with intraventricular extension and subarachnoid hemorrhage of the left temporal lobe in the left sylvian fissure.  CTA head and neck revealed possible vascular malformation involving the inferior margin of the left posterior temporal intraparenchymal hemorrhage.  Neurology and Neurosurgery were consulted.  Patient was admitted to ICU for close monitoring. Patient was started on hypertonic saline at 50 cc/hour with sodium goal of 145, Keppra 500 mg BID, and Cleviprex for BP parameters below 140/90.  Patient was intubated and underwent diagnostic cerebral angiogram with Interventional Neurologist Dr. 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  CT head on 08/31 revealed no significant changes.  Embolization of the  left middle meningeal artery arteriovenous fistula was unsuccessful secondary to tortuosity on 09/01 by Dr. South.  Cleviprex and hypertonic saline were discontinued on 09/02 with placement of feeding tube. Patient was extubated on 09/03 interventional neurology recommended AVM embolization in 3-4 weeks after the ICH has had time to resolve.  Patient was placed in restraints and started on Precedex for agitation.  Precedex was weaned and patient was started on Seroquel 50 mg for agitation on 9/04. Patient was cleared for downgrade out of ICU on 09/05. Hospital medicine was consulted for transition of care and further medical management.        Patient currently being managed for acute encephalopathy due to left-sided hemorrhagic stroke from left temporal AV malformation.  Patient with right residual deficits, aphasia, oropharyngeal dysphagia on NG tube feeds.       Post-Op Info:  * No surgery found *          Medications:  Continuous Infusions:   sodium chloride 0.9% 100 mL/hr at 09/07/23 0254    amiodarone in dextrose 5% 0.5 mg/min (09/07/23 0951)     Scheduled Meds:   levETIRAcetam  500 mg Oral BID    QUEtiapine  50 mg Oral BID     PRN Meds:labetalol, metoprolol     Review of Systems  Objective:     Weight: 58.8 kg (129 lb 10.1 oz)  Body mass index is 21.57 kg/m².  Vital Signs (Most Recent):  Temp: 98.7 °F (37.1 °C) (09/07/23 1020)  Pulse: 99 (09/07/23 1020)  Resp: 18 (09/07/23 1020)  BP: 126/72 (09/07/23 1020)  SpO2: (!) 93 % (09/07/23 1020) Vital Signs (24h Range):  Temp:  [98.5 °F (36.9 °C)-99.3 °F (37.4 °C)] 98.7 °F (37.1 °C)  Pulse:  [] 99  Resp:  [14-20] 18  SpO2:  [77 %-99 %] 93 %  BP: (103-132)/(57-82) 126/72     Date 09/07/23 0700 - 09/08/23 0659   Shift 8538-4557 9827-3532 3833-9546 24 Hour Total   INTAKE   P.O. 0   0   Shift Total(mL/kg) 0(0)   0(0)   OUTPUT   Shift Total(mL/kg)       Weight (kg) 58.8 58.8 58.8 58.8              Oxygen Concentration (%):  [40] 40             NG/OG Tube 09/03/23  "1752 nasogastric 18 Fr. Right nostril (Active)   Placement Check placement verified by aspirate characteristics 09/04/23 1957   Distal Tube Length (cm) 65 09/06/23 2000   Tolerance no signs/symptoms of discomfort 09/07/23 0635   Securement secured to commercial device 09/07/23 0635   Clamp Status/Tolerance unclamped 09/07/23 0635   Feeding Type continuous;by pump 09/07/23 0635   Feeding Action feeding continued 09/07/23 0635   Current Rate (mL/hr) 75 mL/hr 09/06/23 2130   Goal Rate (mL/hr) 75 mL/hr 09/06/23 2130   Intake (mL) 135 mL 09/06/23 1410   Water Bolus (mL) 150 mL 09/06/23 2015   Formula Name Isosource 09/07/23 0635   Intake (mL) - Formula Tube Feeding 1427 09/06/23 2015   Residual Amount (ml) 0 ml 09/06/23 2130       Neurosurgery Physical Exam    Lethargic to obtunded.  Eyes open.    Aphasic   Right-sided paresis.  Localizes on left.    PERRLA bilateral brisk.    Mouth open.    Does not follow commands.        Significant Labs:  Recent Labs   Lab 09/06/23 0227   *   K 3.6   CO2 26   BUN 11.9   CREATININE 0.50*   CALCIUM 8.5   MG 1.90     Recent Labs   Lab 09/06/23 0227 09/07/23  0445   WBC 13.06* 12.09*   HGB 14.4 13.2   HCT 42.5 38.9    161     No results for input(s): "LABPT", "INR", "APTT" in the last 48 hours.  Microbiology Results (last 7 days)       ** No results found for the last 168 hours. **              Assessment/Plan:    Had a very long discussion with patient.  He is uncertain about proceeding with brain surgery.  He states his wife would not want to have brain surgery especially in the current state she is in.  He is concerned about her overall quality of life.  He is made the patient a DNR.  He will need some time to think and make a decision.      Continue every 4 hour neurological exams  Seizure precautions Keppra   Interventional Neurology on board.  Appreciate recommendations.  Plan for AVM embolization.    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

## 2023-09-08 LAB
ALBUMIN SERPL-MCNC: 2.4 G/DL (ref 3.4–4.8)
ALBUMIN/GLOB SERPL: 0.9 RATIO (ref 1.1–2)
ALP SERPL-CCNC: 100 UNIT/L (ref 40–150)
ALT SERPL-CCNC: 50 UNIT/L (ref 0–55)
AST SERPL-CCNC: 46 UNIT/L (ref 5–34)
BASOPHILS # BLD AUTO: 0.04 X10(3)/MCL
BASOPHILS NFR BLD AUTO: 0.3 %
BILIRUB SERPL-MCNC: 0.7 MG/DL
BUN SERPL-MCNC: 11.2 MG/DL (ref 9.8–20.1)
CALCIUM SERPL-MCNC: 7.8 MG/DL (ref 8.4–10.2)
CHLORIDE SERPL-SCNC: 99 MMOL/L (ref 98–107)
CO2 SERPL-SCNC: 23 MMOL/L (ref 23–31)
CREAT SERPL-MCNC: 0.48 MG/DL (ref 0.55–1.02)
EOSINOPHIL # BLD AUTO: 0.07 X10(3)/MCL (ref 0–0.9)
EOSINOPHIL NFR BLD AUTO: 0.5 %
ERYTHROCYTE [DISTWIDTH] IN BLOOD BY AUTOMATED COUNT: 13.4 % (ref 11.5–17)
GFR SERPLBLD CREATININE-BSD FMLA CKD-EPI: >60 MLS/MIN/1.73/M2
GLOBULIN SER-MCNC: 2.8 GM/DL (ref 2.4–3.5)
GLUCOSE SERPL-MCNC: 150 MG/DL (ref 82–115)
HCT VFR BLD AUTO: 37.2 % (ref 37–47)
HGB BLD-MCNC: 12.5 G/DL (ref 12–16)
IMM GRANULOCYTES # BLD AUTO: 0.06 X10(3)/MCL (ref 0–0.04)
IMM GRANULOCYTES NFR BLD AUTO: 0.5 %
LYMPHOCYTES # BLD AUTO: 0.87 X10(3)/MCL (ref 0.6–4.6)
LYMPHOCYTES NFR BLD AUTO: 6.8 %
MCH RBC QN AUTO: 31.5 PG (ref 27–31)
MCHC RBC AUTO-ENTMCNC: 33.6 G/DL (ref 33–36)
MCV RBC AUTO: 93.7 FL (ref 80–94)
MONOCYTES # BLD AUTO: 1.38 X10(3)/MCL (ref 0.1–1.3)
MONOCYTES NFR BLD AUTO: 10.8 %
NEUTROPHILS # BLD AUTO: 10.32 X10(3)/MCL (ref 2.1–9.2)
NEUTROPHILS NFR BLD AUTO: 81.1 %
NRBC BLD AUTO-RTO: 0 %
PLATELET # BLD AUTO: 167 X10(3)/MCL (ref 130–400)
PMV BLD AUTO: 10.9 FL (ref 7.4–10.4)
POCT GLUCOSE: 146 MG/DL (ref 70–110)
POTASSIUM SERPL-SCNC: 4 MMOL/L (ref 3.5–5.1)
PROT SERPL-MCNC: 5.2 GM/DL (ref 5.8–7.6)
RBC # BLD AUTO: 3.97 X10(6)/MCL (ref 4.2–5.4)
SODIUM SERPL-SCNC: 132 MMOL/L (ref 136–145)
WBC # SPEC AUTO: 12.74 X10(3)/MCL (ref 4.5–11.5)

## 2023-09-08 PROCEDURE — 27000221 HC OXYGEN, UP TO 24 HOURS

## 2023-09-08 PROCEDURE — 80053 COMPREHEN METABOLIC PANEL: CPT | Performed by: INTERNAL MEDICINE

## 2023-09-08 PROCEDURE — 97535 SELF CARE MNGMENT TRAINING: CPT | Mod: CO

## 2023-09-08 PROCEDURE — 25000003 PHARM REV CODE 250: Performed by: INTERNAL MEDICINE

## 2023-09-08 PROCEDURE — 99232 SBSQ HOSP IP/OBS MODERATE 35: CPT | Mod: ,,, | Performed by: PSYCHIATRY & NEUROLOGY

## 2023-09-08 PROCEDURE — 63600175 PHARM REV CODE 636 W HCPCS

## 2023-09-08 PROCEDURE — 25000003 PHARM REV CODE 250: Performed by: STUDENT IN AN ORGANIZED HEALTH CARE EDUCATION/TRAINING PROGRAM

## 2023-09-08 PROCEDURE — 99232 PR SUBSEQUENT HOSPITAL CARE,LEVL II: ICD-10-PCS | Mod: ,,, | Performed by: PSYCHIATRY & NEUROLOGY

## 2023-09-08 PROCEDURE — 97530 THERAPEUTIC ACTIVITIES: CPT | Mod: CQ

## 2023-09-08 PROCEDURE — 25000003 PHARM REV CODE 250: Performed by: NURSE PRACTITIONER

## 2023-09-08 PROCEDURE — 94761 N-INVAS EAR/PLS OXIMETRY MLT: CPT

## 2023-09-08 PROCEDURE — 21400001 HC TELEMETRY ROOM

## 2023-09-08 PROCEDURE — 85025 COMPLETE CBC W/AUTO DIFF WBC: CPT

## 2023-09-08 PROCEDURE — 63600175 PHARM REV CODE 636 W HCPCS: Performed by: STUDENT IN AN ORGANIZED HEALTH CARE EDUCATION/TRAINING PROGRAM

## 2023-09-08 RX ORDER — LEVETIRACETAM 100 MG/ML
500 SOLUTION ORAL 2 TIMES DAILY
Status: DISCONTINUED | OUTPATIENT
Start: 2023-09-08 | End: 2023-09-09

## 2023-09-08 RX ORDER — METOPROLOL TARTRATE 25 MG/1
25 TABLET, FILM COATED ORAL ONCE
Status: COMPLETED | OUTPATIENT
Start: 2023-09-08 | End: 2023-09-08

## 2023-09-08 RX ORDER — AMIODARONE HYDROCHLORIDE 200 MG/1
200 TABLET ORAL DAILY
Status: DISCONTINUED | OUTPATIENT
Start: 2023-09-14 | End: 2023-09-11 | Stop reason: HOSPADM

## 2023-09-08 RX ORDER — METOPROLOL TARTRATE 50 MG/1
50 TABLET ORAL 2 TIMES DAILY
Status: DISCONTINUED | OUTPATIENT
Start: 2023-09-08 | End: 2023-09-11 | Stop reason: HOSPADM

## 2023-09-08 RX ORDER — AMIODARONE HYDROCHLORIDE 200 MG/1
200 TABLET ORAL 2 TIMES DAILY
Status: DISCONTINUED | OUTPATIENT
Start: 2023-09-11 | End: 2023-09-11 | Stop reason: HOSPADM

## 2023-09-08 RX ORDER — AMIODARONE HYDROCHLORIDE 200 MG/1
400 TABLET ORAL 2 TIMES DAILY
Status: DISPENSED | OUTPATIENT
Start: 2023-09-08 | End: 2023-09-11

## 2023-09-08 RX ADMIN — QUETIAPINE FUMARATE 50 MG: 25 TABLET ORAL at 08:09

## 2023-09-08 RX ADMIN — LEVETIRACETAM 500 MG: 500 SOLUTION ORAL at 08:09

## 2023-09-08 RX ADMIN — LEVETIRACETAM 500 MG: 500 TABLET, FILM COATED ORAL at 08:09

## 2023-09-08 RX ADMIN — AMIODARONE HYDROCHLORIDE 0.5 MG/MIN: 1.8 INJECTION, SOLUTION INTRAVENOUS at 03:09

## 2023-09-08 RX ADMIN — AMIODARONE HYDROCHLORIDE 400 MG: 200 TABLET ORAL at 08:09

## 2023-09-08 RX ADMIN — AMIODARONE HYDROCHLORIDE 400 MG: 200 TABLET ORAL at 11:09

## 2023-09-08 RX ADMIN — METOPROLOL TARTRATE 25 MG: 25 TABLET, FILM COATED ORAL at 08:09

## 2023-09-08 RX ADMIN — METOPROLOL TARTRATE 25 MG: 25 TABLET, FILM COATED ORAL at 11:09

## 2023-09-08 RX ADMIN — METOPROLOL TARTRATE 50 MG: 50 TABLET, FILM COATED ORAL at 08:09

## 2023-09-08 RX ADMIN — LORAZEPAM 1 MG: 2 INJECTION INTRAMUSCULAR; INTRAVENOUS at 11:09

## 2023-09-08 NOTE — PT/OT/SLP PROGRESS
Physical Therapy Treatment    Patient Name:  Bozena Shelton   MRN:  [id number]    Recommendations:     Discharge Recommendations:  LTACH (long-term acute care hospital), nursing facility, skilled   Discharge Equipment Recommendations: to be determined by next level of care     Subjective     Patient restless, agitated, uncooperative, hostile, and confused.     Objective:     General Precautions: Standard, aphasia, aspiration, fall, seizure, other (see comments) (BP<140/90)   Orthopedic Precautions:    Braces:    Respiratory Status: Room air  Communicated with nurse prior to treatment.     Functional Mobility:    Rolling:Total Assistance  Supine to sit:Total Assistance  Sit to stand transfer: Activity did not occur  Bed to chair transfer:Activity did not occur    Initiated EOB but pt very resistant and combative with me. After physically refusing and hitting me we performed hygiene and linen changed and pt left side lying. Roll belt, left mitt, and bed alarm on. Pt was more verbal today.     Education Provided:  Role and goals of PT, transfer training, bed mobility, gait training, balance training, safety awareness, assistive device, strengthening exercises, and importance of participating in PT to return to PLOF.         Skin Integrity: Visible skin intact    PT interventions performed during the course of today's session in an effort to prevent and/or reduce acquired pressure injuries:   Therapeutic positioning completed       GOALS:   Multidisciplinary Problems       Physical Therapy Goals          Problem: Physical Therapy    Goal Priority Disciplines Outcome Goal Variances Interventions   Physical Therapy Goal     PT, PT/OT Ongoing, Progressing     Description: Goals to be met by: 10/5/2023     Patient will increase functional independence with mobility by performin. Supine to sit with MInimal Assistance  2. Sit to stand transfer with Minimal Assistance  3. Bed to chair transfer with Minimal Assistance using  LRAD  4. Gait  x 50 feet with Minimal Assistance using LRAD.  5. Pt will sit EOB for 5 min with SBA.                           Assessment:     Bozena Shelton is a 73 y.o. female admitted with a medical diagnosis of <principal problem not specified>.     Patient Active Problem List   Diagnosis    Intracerebral hemorrhage    AVM (arteriovenous malformation) brain    Congenital AV fistula of brain    Hemorrhagic cerebrovascular accident (CVA)        Rehab Prognosis: fair; patient would benefit from acute skilled PT services to address these deficits and reach maximum level of function.    Recent Surgery: * No surgery found *      Plan:     During this hospitalization, patient to be seen 5 x/week to address the identified rehab impairments via gait training, therapeutic activities, therapeutic exercises, wheelchair management/training and progress toward the following goals:    Plan of Care Expires:  10/05/23    Billable Minutes     Billable Minutes: Therapeutic Activity 23    Treatment Type: Treatment  PT/PTA: PTA     Number of PTA visits since last PT visit: 3     09/08/2023

## 2023-09-08 NOTE — PROGRESS NOTES
[facility] 4th Floor Medical Telemetry  Neurosurgery  Progress Note    Subjective:     Interval History:  No acute events overnight.  Patient continues to be encephalopathic.  Eyes are open but does not follow commands.  She repeats phrases over and over.  Continues with right facial droop.  Vital signs are stable.  Patient continues with aphasia and right-sided deficits.  She will require a PEG tube placed.       has made patient DNR.   stated that patient would not want to undergo brain surgery and he will be considering his options.  He is unsure if he wants to proceed given her quality of life currently.          History of Present Illness: 73 y.o. female without significant past medical history presented to [facility] on 8/30/2023 following a fall at home.   reported patient fell off the edge of the bed, but was caught by him.   denied LOC or head trauma.  EMS reported patient was confused on scene with slurred speech and right-sided facial droop.  Patient's last known normal was 2100 on 08/30/2023.  CT head revealed intraparenchymal hematoma in the left temporal lobe with intraventricular extension and subarachnoid hemorrhage of the left temporal lobe in the left sylvian fissure.  CTA head and neck revealed possible vascular malformation involving the inferior margin of the left posterior temporal intraparenchymal hemorrhage.  Neurology and Neurosurgery were consulted.  Patient was admitted to ICU for close monitoring. Patient was started on hypertonic saline at 50 cc/hour with sodium goal of 145, Keppra 500 mg BID, and Cleviprex for BP parameters below 140/90.  Patient was intubated and underwent diagnostic cerebral angiogram with Interventional Neurologist Dr. 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  CT head on 08/31 revealed no significant changes.  Embolization of the left  middle meningeal artery arteriovenous fistula was unsuccessful secondary to tortuosity on 09/01 by Dr. South.  Cleviprex and hypertonic saline were discontinued on 09/02 with placement of feeding tube. Patient was extubated on 09/03 interventional neurology recommended AVM embolization in 3-4 weeks after the ICH has had time to resolve.  Patient was placed in restraints and started on Precedex for agitation.  Precedex was weaned and patient was started on Seroquel 50 mg for agitation on 9/04. Patient was cleared for downgrade out of ICU on 09/05. Hospital medicine was consulted for transition of care and further medical management.        Patient currently being managed for acute encephalopathy due to left-sided hemorrhagic stroke from left temporal AV malformation.  Patient with right residual deficits, aphasia, oropharyngeal dysphagia on NG tube feeds.       Post-Op Info:  Procedure(s) (LRB):  PEG (N/A)          Medications:  Continuous Infusions:   sodium chloride 0.9% 100 mL/hr at 09/07/23 2106     Scheduled Meds:   amiodarone  400 mg Per NG tube BID    Followed by    [START ON 9/11/2023] amiodarone  200 mg Per NG tube BID    Followed by    [START ON 9/14/2023] amiodarone  200 mg Per NG tube Daily    levETIRAcetam  500 mg Oral BID    metoprolol tartrate  50 mg Per NG tube BID    QUEtiapine  50 mg Oral BID     PRN Meds:labetalol, lorazepam, metoprolol     Review of Systems  Objective:     Weight: 58.8 kg (129 lb 10.1 oz)  Body mass index is 21.57 kg/m².  Vital Signs (Most Recent):  Temp: 100 °F (37.8 °C) (09/08/23 1441)  Pulse: 84 (09/08/23 1441)  Resp: 18 (09/08/23 1441)  BP: 110/67 (09/08/23 1441)  SpO2: (!) 94 % (09/08/23 1441) Vital Signs (24h Range):  Temp:  [98.6 °F (37 °C)-100.3 °F (37.9 °C)] 100 °F (37.8 °C)  Pulse:  [] 84  Resp:  [18] 18  SpO2:  [92 %-98 %] 94 %  BP: (110-141)/(67-81) 110/67                                NG/OG Tube 09/03/23 2348 nasogastric 18 Fr. Right nostril (Active)  "  Placement Check placement verified by aspirate characteristics 09/04/23 1957   Distal Tube Length (cm) 65 09/06/23 2000   Tolerance no signs/symptoms of discomfort 09/07/23 0635   Securement secured to commercial device 09/07/23 0635   Clamp Status/Tolerance unclamped 09/07/23 0635   Feeding Type continuous;by pump 09/07/23 0635   Feeding Action feeding continued 09/07/23 0635   Current Rate (mL/hr) 75 mL/hr 09/06/23 2130   Goal Rate (mL/hr) 75 mL/hr 09/06/23 2130   Intake (mL) 135 mL 09/06/23 1410   Water Bolus (mL) 150 mL 09/06/23 2015   Formula Name Isosource 09/07/23 0635   Intake (mL) - Formula Tube Feeding 1427 09/06/23 2015   Residual Amount (ml) 0 ml 09/06/23 2130       Neurosurgery Physical Exam    Lethargic to obtunded.  Eyes open.    Aphasic -with repeating some words such as yes or no.  Right-sided paresis.  Localizes on left.    PERRLA bilateral brisk.    Mouth open.    Does not follow commands.        Significant Labs:  Recent Labs   Lab 09/08/23  0509   *   K 4.0   CO2 23   BUN 11.2   CREATININE 0.48*   CALCIUM 7.8*       Recent Labs   Lab 09/07/23  0445 09/08/23  0509   WBC 12.09* 12.74*   HGB 13.2 12.5   HCT 38.9 37.2    167       No results for input(s): "LABPT", "INR", "APTT" in the last 48 hours.  Microbiology Results (last 7 days)       ** No results found for the last 168 hours. **              Assessment/Plan:    Had a very long discussion with patient's  yesterday.  He is uncertain about proceeding with brain surgery.  He states his wife would not want to have brain surgery especially in the current state she is in.  He is concerned about her overall quality of life.  He is made the patient a DNR.  He will need some time to think and make a decision.    Dr. Suoth spoke with patient's  over phone.  Patient's  stated they will be having a family discussion before proceeding.      Continue every 4 hour neurological exams  Seizure precautions Keppra "   Interventional Neurology on board.  Appreciate recommendations.  Plan for AVM embolization and then AVM resection if family chooses to proceed.  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

## 2023-09-08 NOTE — PROGRESS NOTES
"Neurointerventional progress note:    Subjective:   Patient transferred to floor. Needed Amio gtt due to A. Flutter. Followed by cardiology and transitioned to PO today. Mental status remains the same and she is now DNR per .     Exam:  Vitals:    09/08/23 1441   BP: 110/67   Pulse: 84   Resp: 18   Temp: 100 °F (37.8 °C)     Neuro exam:  Disoriented, does not follow commands. Answers and groans saying "what", "yes", "no"  Right facial droop. Pupils asymmetric (at baseline)  Moves left side spontaneously. No significant movement noted in right side.   Withdraws to pain on right.     Imaging:   No new imaging available for review.     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 She also has a McCarr Grade 1 left MMA AV fistula draining into the SSS. Attempted embolization of the AV fistula but was unsuccessful due to tortuosity in the left MMA.     After discussing and reviewing images with Dr Barron ([facility]), the tentative plan was to take her for pre-operative embolization on Thursday Sept 14 followed by craniotomy for AVM resection on Friday Sept 15.     However, patient has been made DNR by the family given the lack of improvement in her neurological status. I spoke to  over the phone and explained that she will likely remain aphasic given the location of the hemorrhage but the mentation can potentially improve over the long term. She will likely need to go to nursing facility given the poor neurological status she is right now and lack of improvement over the last 1 week. However, he is concerned and does not want her in discomfort and pain and is inclining towards not pursuing intervention at this time. He wants to have a family discussion including his daughter at bedside before making the final decision.     Plan for family meeting on Monday at noon.       Yaakov South MD  Vascular and Interventional Neurology    "

## 2023-09-08 NOTE — CONSULTS
Consult Note    Reason for Consult:      We were consulted by Dr. Diaz to evaluate this patient for PEG.    HPI:     73 year old  female unknown to our group with no significant prior pmhx.  Presented on 8/30 after a fall with confusion, slurred speech, and right facial droop.  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  Embolization attempted.  Hospital course complicated by atrial flutter with RVR, now normal sinus as well as encephalopathy and hypoxic respiratory failure requiring 3 L nasal cannula.  Patient does not follow commands.  Speech therapy has been following.  Due to mental status and poor secretion management, patient remains unsafe for p.o. intake.  NG tube has been in place.  GI was consulted for PEG.    PCP:  No, Primary Doctor    Review of patient's allergies indicates:  Not on File     Current Facility-Administered Medications   Medication Dose Route Frequency Provider Last Rate Last Admin    0.9%  NaCl infusion   Intravenous Continuous Irma Roberts  mL/hr at 09/07/23 2106 New Bag at 09/07/23 2106    amiodarone tablet 400 mg  400 mg Per NG tube BID Melanie Calderon, FNP        Followed by    [START ON 9/11/2023] amiodarone tablet 200 mg  200 mg Per NG tube BID Melanie Calderon, FNP        Followed by    [START ON 9/14/2023] amiodarone tablet 200 mg  200 mg Per NG tube Daily Melanie Calderon, FNP        labetaloL injection 10 mg  10 mg Intravenous Q4H PRN Ramiro De Santiago DO   10 mg at 09/05/23 0428    levETIRAcetam tablet 500 mg  500 mg Oral BID Gokul Mejia MD   500 mg at 09/08/23 0852    LORazepam (ATIVAN) injection 1 mg  1 mg Intravenous Q8H PRN Dev Diaz MD        metoprolol injection 5 mg  5 mg Intravenous Q4H PRN Melanie Calderon, FNP   5 mg at 09/06/23 2256    metoprolol tartrate (LOPRESSOR) tablet 25 mg  25 mg Per NG tube Once Melanie Calderon T, FNP        metoprolol tartrate (LOPRESSOR) tablet 50 mg  50 mg Per NG tube BID Melanie Calderon, FNP        QUEtiapine tablet 50  mg  50 mg Oral BID Chriss Becerra DO   50 mg at 09/08/23 0852     No medications prior to admission.       Past Medical History:  History reviewed. No pertinent past medical history.   Past Surgical History:  History reviewed. No pertinent surgical history.   Family History:  History reviewed. No pertinent family history.  Social History:  Social History     Tobacco Use    Smoking status: Not on file    Smokeless tobacco: Not on file   Substance Use Topics    Alcohol use: Not on file       Review of Systems:     Review of Systems   Unable to perform ROS: Mental status change     Objective:     VITAL SIGNS: 24 HR MIN & MAX LAST    Temp  Min: 98.6 °F (37 °C)  Max: 100.3 °F (37.9 °C)  99.1 °F (37.3 °C)        BP  Min: 115/73  Max: 145/82  127/79     Pulse  Min: 86  Max: 103  96     Resp  Min: 18  Max: 18  18    SpO2  Min: 92 %  Max: 98 %  98 %        Intake/Output Summary (Last 24 hours) at 9/8/2023 1151  Last data filed at 9/8/2023 0646  Gross per 24 hour   Intake 0 ml   Output 1250 ml   Net -1250 ml       Physical Exam  Constitutional:       General: She is not in acute distress.     Appearance: She is ill-appearing.      Comments: Somnolent    HENT:      Head: Normocephalic and atraumatic.      Nose:      Comments: NG in place with continuous TFs going at 75 cc/hr  Eyes:      General: No scleral icterus.  Cardiovascular:      Rate and Rhythm: Normal rate and regular rhythm.   Pulmonary:      Effort: Pulmonary effort is normal. No respiratory distress.   Abdominal:      General: Bowel sounds are normal. There is no distension.      Palpations: Abdomen is soft. There is no mass.      Tenderness: There is no abdominal tenderness. There is no guarding or rebound.      Comments: No prior abdominal surgical scars appreciated.    Musculoskeletal:         General: Normal range of motion.      Right lower leg: No edema.      Left lower leg: No edema.   Skin:     General: Skin is warm and dry.   Neurological:      Mental  Status: She is disoriented.      Comments: Difficulty arousing           Recent Results (from the past 48 hour(s))   CBC with Differential    Collection Time: 09/07/23  4:45 AM   Result Value Ref Range    WBC 12.09 (H) 4.50 - 11.50 x10(3)/mcL    RBC 4.09 (L) 4.20 - 5.40 x10(6)/mcL    Hgb 13.2 12.0 - 16.0 g/dL    Hct 38.9 37.0 - 47.0 %    MCV 95.1 (H) 80.0 - 94.0 fL    MCH 32.3 (H) 27.0 - 31.0 pg    MCHC 33.9 33.0 - 36.0 g/dL    RDW 13.5 11.5 - 17.0 %    Platelet 161 130 - 400 x10(3)/mcL    MPV 10.6 (H) 7.4 - 10.4 fL    Neut % 80.3 %    Lymph % 7.7 %    Mono % 10.7 %    Eos % 0.6 %    Basophil % 0.2 %    Lymph # 0.93 0.6 - 4.6 x10(3)/mcL    Neut # 9.72 (H) 2.1 - 9.2 x10(3)/mcL    Mono # 1.29 0.1 - 1.3 x10(3)/mcL    Eos # 0.07 0 - 0.9 x10(3)/mcL    Baso # 0.02 <=0.2 x10(3)/mcL    IG# 0.06 (H) 0 - 0.04 x10(3)/mcL    IG% 0.5 %    NRBC% 0.0 %   Echo    Collection Time: 09/07/23  1:16 PM   Result Value Ref Range    BSA 1.64 m2    LVOT stroke volume 47.41 cm3    LVIDd 4.60 3.5 - 6.0 cm    LV Systolic Volume 37.90 mL    LV Systolic Volume Index 23.0 mL/m2    LVIDs 3.10 2.1 - 4.0 cm    LV Diastolic Volume 97.30 mL    LV Diastolic Volume Index 58.97 mL/m2    IVS 0.90 0.6 - 1.1 cm    LVOT diameter 2.00 cm    LVOT area 3.1 cm2    FS 33 28 - 44 %    Left Ventricle Relative Wall Thickness 0.48 cm    Posterior Wall 1.10 0.6 - 1.1 cm    LV mass 158.81 g    LV Mass Index 96 g/m2    MV Peak E Graeme 0.60 m/s    TDI LATERAL 0.15 m/s    TDI SEPTAL 0.10 m/s    E/E' ratio 4.80 m/s    MV Peak A Graeme 0.56 m/s    TR Max Graeme 2.62 m/s    E/A ratio 1.07     E wave deceleration time 143.00 msec    LV SEPTAL E/E' RATIO 6.00 m/s    LV LATERAL E/E' RATIO 4.00 m/s    LVOT peak graeme 0.95 m/s    Left Ventricular Outflow Tract Mean Velocity 0.62 cm/s    Left Ventricular Outflow Tract Mean Gradient 2.00 mmHg    LA size 3.80 cm    RVDD 2.80 cm    TAPSE 2.79 cm    AV mean gradient 4 mmHg    AV peak gradient 7 mmHg    Ao peak graeme 1.32 m/s    Ao VTI 21.70 cm     LVOT peak VTI 15.10 cm    AV valve area 2.18 cm²    AV Velocity Ratio 0.72     AV index (prosthetic) 0.70     AILEEN by Velocity Ratio 2.26 cm²    MV mean gradient 1 mmHg    MV peak gradient 2 mmHg    MV stenosis pressure 1/2 time 47.00 ms    MV valve area p 1/2 method 4.68 cm2    MV valve area by continuity eq 3.08 cm2    MV VTI 15.4 cm    Triscuspid Valve Regurgitation Peak Gradient 27 mmHg    PV PEAK VELOCITY 0.95 m/s    PV peak gradient 4 mmHg    Mean e' 0.13 m/s    ZLVIDS 0.61     ZLVIDD -0.07    Comprehensive Metabolic Panel    Collection Time: 09/08/23  5:09 AM   Result Value Ref Range    Sodium Level 132 (L) 136 - 145 mmol/L    Potassium Level 4.0 3.5 - 5.1 mmol/L    Chloride 99 98 - 107 mmol/L    Carbon Dioxide 23 23 - 31 mmol/L    Glucose Level 150 (H) 82 - 115 mg/dL    Blood Urea Nitrogen 11.2 9.8 - 20.1 mg/dL    Creatinine 0.48 (L) 0.55 - 1.02 mg/dL    Calcium Level Total 7.8 (L) 8.4 - 10.2 mg/dL    Protein Total 5.2 (L) 5.8 - 7.6 gm/dL    Albumin Level 2.4 (L) 3.4 - 4.8 g/dL    Globulin 2.8 2.4 - 3.5 gm/dL    Albumin/Globulin Ratio 0.9 (L) 1.1 - 2.0 ratio    Bilirubin Total 0.7 <=1.5 mg/dL    Alkaline Phosphatase 100 40 - 150 unit/L    Alanine Aminotransferase 50 0 - 55 unit/L    Aspartate Aminotransferase 46 (H) 5 - 34 unit/L    eGFR >60 mls/min/1.73/m2   CBC with Differential    Collection Time: 09/08/23  5:09 AM   Result Value Ref Range    WBC 12.74 (H) 4.50 - 11.50 x10(3)/mcL    RBC 3.97 (L) 4.20 - 5.40 x10(6)/mcL    Hgb 12.5 12.0 - 16.0 g/dL    Hct 37.2 37.0 - 47.0 %    MCV 93.7 80.0 - 94.0 fL    MCH 31.5 (H) 27.0 - 31.0 pg    MCHC 33.6 33.0 - 36.0 g/dL    RDW 13.4 11.5 - 17.0 %    Platelet 167 130 - 400 x10(3)/mcL    MPV 10.9 (H) 7.4 - 10.4 fL    Neut % 81.1 %    Lymph % 6.8 %    Mono % 10.8 %    Eos % 0.5 %    Basophil % 0.3 %    Lymph # 0.87 0.6 - 4.6 x10(3)/mcL    Neut # 10.32 (H) 2.1 - 9.2 x10(3)/mcL    Mono # 1.38 (H) 0.1 - 1.3 x10(3)/mcL    Eos # 0.07 0 - 0.9 x10(3)/mcL    Baso # 0.04 <=0.2  x10(3)/mcL    IG# 0.06 (H) 0 - 0.04 x10(3)/mcL    IG% 0.5 %    NRBC% 0.0 %       Echo    Result Date: 9/7/2023    Left Ventricle: There is normal systolic function with a visually estimated ejection fraction of 55 - 60%. There is normal diastolic function.   Right Ventricle: Mild right ventricular enlargement.   Right Atrium: Right atrium is dilated.     XR Gastric tube check, non-radiologist performed    Result Date: 9/3/2023  EXAMINATION: XR GASTRIC TUBE CHECK, NON-RADIOLOGIST PERFORMED CLINICAL HISTORY: ng tube; COMPARISON: None FINDINGS: Frontal image of the upper abdomen.  Enteric tube tip near the expected area of the GE junction.  Suggest advancing 5 cm if possible.     As above. Electronically signed by: Roni Sierra Date:    09/03/2023 Time:    18:23    CT Head Without Contrast    Result Date: 9/3/2023  EXAMINATION: CT HEAD WITHOUT CONTRAST CLINICAL HISTORY: Stroke, follow up; TECHNIQUE: Axial scans were obtained from skull base to the vertex. Coronal and sagittal reconstructions obtained from the axial data. Automatic exposure control was utilized to limit radiation dose. Contrast: None Radiation Dose: Total DLP: 1045 mGy*cm COMPARISON: CT head dated 08/31/2023 FINDINGS: There is stable size of the left cerebral parenchymal hemorrhage with surrounding edema.  There is a small amount of interventricular hemorrhage layering in the occipital horns.  Small volume of surrounding subarachnoid hemorrhage has overall decreased. There is stable mass effect with partial effacement the left lateral ventricle.  The ventricles are stable in size with mild dilatation of the left temporal horn.  There is no midline shift or herniation.  The basal cisterns are patent.  The calvarium and skull base are intact.     Decreasing volume of subarachnoid hemorrhage with otherwise stable exam. No major change from the [facility] interpretation. Electronically signed by: Stacey Perez Date:    09/03/2023 Time:    08:50    IR  Embolization (CNS) Intracranial    Result Date: 9/1/2023  EXAMINATION: IR EMBOLIZATION (CNS) INTRACRANIAL CLINICAL HISTORY: possible embo; FINDINGS: Fluoroscopic assistance provided during vascular procedure.  Images were independently interpreted by the attending physician performing the procedure. Fluoro time 10.8 minutes. Reference Air Kerma: 411 mGy.     Fluoroscopic assistance provided as above. Electronically signed by: Roni Sierra Date:    09/01/2023 Time:    15:49    IR Ultrasound Guidance    Result Date: 9/1/2023  EXAMINATION: IR ULTRASOUND GUIDANCE CLINICAL HISTORY: radial access for angiogram; FINDINGS: Sonographic assistance provided during vascular procedure.  Images were independently interpreted by the attending physician performing the procedure.     Sonographic assistance provided during vascular procedure. Electronically signed by: Roni Sierra Date:    09/01/2023 Time:    13:36    X-Ray Chest 1 View for Line/Tube Placement    Result Date: 8/31/2023  EXAMINATION: XR CHEST 1 VIEW FOR LINE/TUBE PLACEMENT CLINICAL HISTORY: picc placement; TECHNIQUE: Single frontal portable view of the chest was performed. COMPARISON: None FINDINGS: Endotracheal tube and PICC line are in place.  The distal end of the PICC line is overlying superior vena cava.  Film is rotated to the left cannot clear behind the heart.     Good position of endotracheal tube and PICC line.  Cannot clear behind the heart on the left. Electronically signed by: Yassine Carlson MD Date:    08/31/2023 Time:    21:16    IR Angiogram Carotid Cerebral Bilateral    Result Date: 8/31/2023  See OP Notes for results. IMPRESSION: See OP Notes for results. This procedure was auto-finalized by: Virtual Radiologist    IR Ultrasound Guidance    Result Date: 8/31/2023  EXAMINATION IR ULTRASOUND GUIDANCE CLINICAL HISTORY Procedural guidance - access femoral artery; TECHNIQUE/FINDINGS Images obtained for intra-procedural guidance, independently evaluated by  the performing physician. There will be no Diagnostic Radiology interpretation. Electronically signed by: Kory De La Vega Date:    08/31/2023 Time:    12:48    CT Head Without Contrast    Result Date: 8/31/2023  EXAMINATION: CT HEAD WITHOUT CONTRAST CLINICAL HISTORY: Stroke, follow up; TECHNIQUE: Low dose axial CT images obtained throughout the head without intravenous contrast.  Axial, sagittal and coronal reconstructions were performed and interpreted. DLP: 1115 mGycm All CT scans at this location are performed using dose optimization techniques as appropriate to a performed exam including the following automated exposure control, adjustment of the mA and/or kV according to patient size and/or use of iterative reconstruction technique COMPARISON: CT head 08/30/2023 FINDINGS: BRAIN: Intraparenchymal hemorrhage at the left parietal lobe is again seen estimated to measure 4.7 x 4.7 cm in axial diameter; previously 5 x 4.7 cm.  There is mild surrounding edema, similar to previous with resultant sulcal crowding.  No midline shift. VENTRICLES: There is interventricular extension of hemorrhage at the left lateral ventricle similar to prior.  Hemorrhage is seen layering dependently within the bilateral occipital horns.  There is mild mass effect on the left lateral ventricle.  There is dilatation of the temporal horn of the left ventricle, similar to previous. EXTRA-AXIAL: There is subarachnoid extension of hemorrhage at the left sylvian fissure, similar to previous.  The basilar cisterns are patent.  No herniation. BONES: Calvarium is intact. SINUSES AND MASTOIDS: Secretions at the left sphenoid sinus.  Left mastoid effusion.  Left middle ear effusion. OTHER: Post treatment change at the left globe.     Very similar appearance to previous exam with left parietal intraparenchymal hemorrhage with intraventricular and subarachnoid extension of hemorrhage. Similar vasogenic edema with local mass effect on the left lateral  ventricle with very mild dilatation of the temporal horn on the left. The preliminary and final reports are concordant. Electronically signed by: Jessa Tran Date:    08/31/2023 Time:    08:36    CTA Head and Neck (xpd)    Result Date: 8/31/2023  EXAMINATION: CTA HEAD AND NECK (XPD) CLINICAL HISTORY: Stroke, hemorrhagic; TECHNIQUE: Axial images obtained through the cervical region and [place] of [place] before and after the administration of intravenous contrast. Coronal, sagittal, MIP and 3D reconstructions were obtained from the axial data. Automatic exposure control was utilized to limit radiation dose. Radiation Dose: Total DLP: 1448 mGy*cm COMPARISON: CT head dated 08/30/2023 FINDINGS: Head CT with contrast: No interval changes when compared to the previous CT. No enhancing abnormalities. If present, stenosis of the carotid bulbs is measured based on NASCET criteria, i.e. area of maximal stenosis compared to the cervical ICA distal to the bulb. Cervical CTA: The origins of the great vessels are patent mild calcifications. The common carotid arteries, carotid bulbs and internal carotid arteries are patent.  There are mild calcifications at the carotid bulbs without hemodynamic significant stenosis. The vertebral arteries are patent. Intracranial CTA: The internal carotid arteries, middle cerebral arteries and anterior cerebral is are patent. The vertebral arteries, basilar artery and posterior cerebral arteries are patent. There is an ill-defined area of contrast along the Additional findings: There is an ill-defined area of contrast along the inferior aspect of the hematoma measuring 7 mm, with a connecting prominent draining vein (series 17, image 239; series 18, image 24; series 19, image 33).     1. No large vessel occlusion or flow-limiting stenosis. 2. Possible small vascular malformation along the inferior margin of the left cerebral hematoma. Change to overnight interpretation given to Jair BIGGS at  the time of dictation. Electronically signed by: Stacey Perez Date:    08/31/2023 Time:    08:31    CT Cervical Spine Without Contrast    Result Date: 8/31/2023  EXAMINATION: CT CERVICAL SPINE WITHOUT CONTRAST CLINICAL HISTORY: Neck trauma (Age >= 65y); TECHNIQUE: Noncontrast CT images of the cervical spine. Axial, coronal, and sagittal reformatted images were obtained. Dose length product is 1661 mGycm. Automatic exposure control, adjustment of mA/kV or iterative reconstruction technique was used to limit radiation dose. COMPARISON: None FINDINGS: The cervical spine is visualized through the level of T1. There is no acute fracture identified.  There are multilevel degenerative changes with disc height loss, marginal osteophyte formation and facet arthropathy.  There is no paraspinal hematoma.     No acute fracture identified. No significant change from the [facility] interpretation. Electronically signed by: Stacey Perez Date:    08/31/2023 Time:    08:17    CT Head Without Contrast    Result Date: 8/31/2023  EXAMINATION: CT HEAD WITHOUT CONTRAST CLINICAL HISTORY: Neuro deficit, acute, stroke suspected; TECHNIQUE: Axial scans were obtained from skull base to the vertex. Coronal and sagittal reconstructions obtained from the axial data. Automatic exposure control was utilized to limit radiation dose. Contrast: None Radiation Dose: Total DLP: 1661 mGy*cm COMPARISON: None FINDINGS: A left frontal parietal lobe hematoma measures 4.3 x 4.5 cm in size with surrounding edema.  There is a small amount of interventricular hemorrhage.  Subarachnoid hemorrhage is seen in the left sylvian fissure. There is mass effect with partial effacement the right lateral ventricle and entrapment of the left temporal horn.  There is no midline shift or herniation.  The basal cisterns are patent.  The calvarium and skull base are intact.  There is a small amount of fluid layering in the left sphenoid sinus.  A left mastoid and middle  ear effusion are noted.     Left frontoparietal lobe hematoma with surrounding edema and small volume intraventricular and subarachnoid hemorrhage. No significant change from the overnight interpretation. Electronically signed by: Stacey Perez Date:    08/31/2023 Time:    08:14      Assessment / Plan:     72 yo F unknown to our group with no significant prior pmhx.  Presented on 8/30 after a fall with confusion, slurred speech, and right facial droop.  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  Embolization attempted.  Hospital course complicated by atrial flutter with RVR, now normal sinus as well as encephalopathy and hypoxic respiratory failure requiring 3 L nasal cannula.  Due to mental status and poor secretion management, patient remains unsafe for p.o. intake.  GI was consulted for PEG.    1. Oropharyngeal dysphagia   Unsafe for PO.  - Discussed EGD/PEG with  via phone.  Wishes to proceed.    - NPO after [facility] and Hold TFs at [facility] on Sunday for EGD/PEG Monday, 9/11.  Orders placed.      GI available if needed over the weekend.         Thank you for allowing us to participate in this patient's care.

## 2023-09-08 NOTE — PROGRESS NOTES
[facility] Hospital Medicine Progress Note        Chief Complaint: Inpatient Follow-up for     HPI: 73 y.o. female without significant past medical history presented to [facility] on 8/30/2023 following a fall at home.   reported patient fell off the edge of the bed, but was caught by him.   denied LOC or head trauma.  EMS reported patient was confused on scene with slurred speech and right-sided facial droop.  Patient's last known normal was 2100 on 08/30/2023.  CT head revealed intraparenchymal hematoma in the left temporal lobe with intraventricular extension and subarachnoid hemorrhage of the left temporal lobe in the left sylvian fissure.  CTA head and neck revealed possible vascular malformation involving the inferior margin of the left posterior temporal intraparenchymal hemorrhage.  Neurology and Neurosurgery were consulted.  Patient was admitted to ICU for close monitoring. Patient was started on hypertonic saline at 50 cc/hour with sodium goal of 145, Keppra 500 mg BID, and Cleviprex for BP parameters below 140/90.  Patient was intubated and underwent diagnostic cerebral angiogram with Interventional Neurologist Dr. 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  CT head on 08/31 revealed no significant changes.  Embolization of the left middle meningeal artery arteriovenous fistula was unsuccessful secondary to tortuosity on 09/01 by Dr. South.  Cleviprex and hypertonic saline were discontinued on 09/02 with placement of feeding tube. Patient was extubated on 09/03 interventional neurology recommended AVM embolization in 3-4 weeks after the ICH has had time to resolve.  Patient was placed in restraints and started on Precedex for agitation.  Precedex was weaned and patient was started on Seroquel 50 mg for agitation on 9/04. Patient was cleared for downgrade out of ICU on  09/05. Hospital medicine was consulted for transition of care and further medical management.  Patient has a NG tube    Interval Hx:   Patient seen and examined this morning with significant other bedside who is really concerned about patient having a surgery  Case was discussed with patient's nurse and  on the floor.    Objective/physical exam:  General: In no acute distress, afebrile  Chest: Clear to auscultation bilaterally  Heart: RRR, +S1, S2, no appreciable murmur  Abdomen: Soft, nontender, BS +  MSK: Warm,   Neurologic:  Sleepy opens her eyes  VITAL SIGNS: 24 HRS MIN & MAX LAST   Temp  Min: 98.6 °F (37 °C)  Max: 100.3 °F (37.9 °C) 100 °F (37.8 °C)   BP  Min: 110/67  Max: 141/81 110/67   Pulse  Min: 84  Max: 103  84   Resp  Min: 18  Max: 18 18   SpO2  Min: 92 %  Max: 98 % (!) 94 %     I have reviewed the following labs:  Recent Labs   Lab 09/06/23 0227 09/07/23  0445 09/08/23  0509   WBC 13.06* 12.09* 12.74*   RBC 4.58 4.09* 3.97*   HGB 14.4 13.2 12.5   HCT 42.5 38.9 37.2   MCV 92.8 95.1* 93.7   MCH 31.4* 32.3* 31.5*   MCHC 33.9 33.9 33.6   RDW 13.4 13.5 13.4    161 167   MPV 10.6* 10.6* 10.9*     Recent Labs   Lab 09/02/23  0945 09/02/23  1144 09/04/23  0222 09/05/23  0207 09/06/23 0227 09/08/23  0509   NA  --    < > 141 138 133* 132*   K  --    < > 3.5 3.5 3.6 4.0   CO2  --    < > 22* 27 26 23   BUN  --    < > 10.1 11.9 11.9 11.2   CREATININE  --    < > 0.44* 0.52* 0.50* 0.48*   CALCIUM  --    < > 8.4 8.7 8.5 7.8*   PH 7.430  --   --   --   --   --    MG  --    < > 1.80 1.60 1.90  --    ALBUMIN  --    < > 3.2* 3.2* 3.0* 2.4*   ALKPHOS  --    < > 64 77 75 100   ALT  --    < > 28 32 34 50   AST  --    < > 40* 34 31 46*   BILITOT  --    < > 1.2 1.0 0.9 0.7    < > = values in this interval not displayed.     Microbiology Results (last 7 days)       ** No results found for the last 168 hours. 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   Acute encephalopathy secondary to above  Atrial flutter with RVR now sinus rhythm on amiodarone infusion  Right-sided paresis with aphasia due to above  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 draining into the superior sagittal sinus  Sinus tachycardia to dehydration  Borderline low blood pressure   Hypoxic respiratory failure on 2 L of oxygen  Elevated leukocytosis, trending upwards  Oropharyngeal dysphagia on NG tube feeds         has made the patient DNR he does not think that patient would want to undergo brain surgery he is still considering his options and does not know if he wants to proceed  Did talk to him today and he is agreeable for PEG tube placement so we have consulted GI  Plan for PEG tube placement on Monday  More awake today but still not following much commands has NG tube  Blood pressure is better, continue with Keppra b.i.d. seizure prophylaxis Seroquel  White cell count is stable will happen keep a close watch  We will see how patient does with speech therapy since he is slightly more awake if not then might need a feeding tube   Discussed with patient's  he is agreeable to feeding tube at this time   In the meantime continue with PT OT and ST   Cardiology following  Continue with amiodarone infusion started on beta-blocker   Will give Ativan as needed for any kind of agitation    Critical care note:  Critical care diagnosis:  Atrial flutter with RVR on amiodarone infusion  Critical care  interventions: Hands-on evaluation, review of labs/radiographs/records and discussion with patient and family if present  Critical care time spent: 35 minutes   VTE prophylaxis:     Patient condition:  Stable/Fair/Guarded/ Serious/ Critical    Anticipated discharge and Disposition:         All diagnosis and differential diagnosis have been reviewed; assessment and plan has been documented; I have personally reviewed the labs and test results that are presently available; I have reviewed the patients medication list; I have reviewed the consulting providers response and recommendations. I have reviewed or attempted to review medical records based upon their availability    All of the patient's questions have been  addressed and answered. Patient's is agreeable to the above stated plan. I will continue to monitor closely and make adjustments to medical management as needed.  _____________________________________________________________________    Nutrition Status:    Radiology:  I have personally reviewed the following imaging and agree with the radiologist.     Echo    Left Ventricle: There is normal systolic function with a visually   estimated ejection fraction of 55 - 60%. There is normal diastolic   function.    Right Ventricle: Mild right ventricular enlargement.    Right Atrium: Right atrium is dilated.      Shima Omer MD   09/08/2023

## 2023-09-08 NOTE — PROGRESS NOTES
[facility] - 4th Floor Medical Telemetry    Cardiology  Progress Note    Patient Name: Bozena Shelton  MRN: [id number]  Admission Date: 8/30/2023  Hospital Length of Stay: 9 days  Code Status: DNR   Attending Physician: Dev Diaz MD   Primary Care Physician: Shannan, Primary Doctor  Expected Discharge Date:   Principal Problem:<principal problem not specified>    Subjective:   Consultation Reason: AFL RVR     HPI:   Ms. Shelton is a 73 year old female, unknown to [facility], who presented to the hospital status post fall at home. Her  reported that she slipped off the edge of the bed. He caught her. He denied patient had LOC or head trauma. EMS reported patient was confused on scene with slurred speech and right-sided facial droop.  Patient's last known normal was 2100 on 08/30/2023.  CT head revealed intraparenchymal hematoma in the left temporal lobe with intraventricular extension and subarachnoid hemorrhage of the left temporal lobe in the left sylvian fissure.  CTA head and neck revealed possible vascular malformation involving the inferior margin of the left posterior temporal intraparenchymal hemorrhage. Patient noted to be tachycardic since arrival. On 9.6.23 patient noted to be AFL RVR. [facility] is consulted for AFL Management.    Hospital Course:   9.7.23: NAD Noted. Patient is in SR. On Amiodarone Infusion at 0.5 Mg/Min. Intermittent bouts of AFL RVR. BP Stable. She is sleeping/obtunded from neurological standpoint. NG Tube in place.   9.8.23: NAD Noted. Drowsy/obtunded. ST on Tele. BP Stable. NG Tube in Place.      PMH: Unable to Obtain  PSH: Unable to Obtain  Family History: Unable to Obtain  Social History: Unable to Obtain     Previous Cardiac Diagnostics:   Echocardiogram (9.7.23):  Left Ventricle: There is normal systolic function with a visually estimated ejection fraction of 55 - 60%. There is normal diastolic function.  Right Ventricle: Mild right ventricular enlargement.  Right Atrium:  Right atrium is dilated.     Review of patient's allergies indicates:  Not on File    Review of Systems   Unable to perform ROS: Acuity of condition     Objective:     Vital Signs (Most Recent):  Temp: 100.3 °F (37.9 °C) (09/08/23 0744)  Pulse: 102 (09/08/23 0744)  Resp: 18 (09/08/23 0337)  BP: (!) 141/81 (09/08/23 0744)  SpO2: (!) 94 % (09/08/23 0744) Vital Signs (24h Range):  Temp:  [98.6 °F (37 °C)-100.3 °F (37.9 °C)] 100.3 °F (37.9 °C)  Pulse:  [] 102  Resp:  [18] 18  SpO2:  [92 %-97 %] 94 %  BP: (115-145)/(70-82) 141/81     Weight: 58.8 kg (129 lb 10.1 oz)  Body mass index is 21.57 kg/m².    SpO2: (!) 94 %         Intake/Output Summary (Last 24 hours) at 9/8/2023 0806  Last data filed at 9/8/2023 0646  Gross per 24 hour   Intake 0 ml   Output 1250 ml   Net -1250 ml         Lines/Drains/Airways       Drain  Duration                  NG/OG Tube 09/03/23 1752 nasogastric 18 Fr. Right nostril 4 days              Peripheral Intravenous Line  Duration                  Peripheral IV - Single Lumen 09/04/23 1200 20 G Posterior;Right Hand 3 days         Peripheral IV - Single Lumen 09/06/23 1405 20 G Anterior;Left;Proximal Forearm 1 day                    Significant Labs:   Recent Results (from the past 72 hour(s))   Comprehensive Metabolic Panel    Collection Time: 09/06/23  2:27 AM   Result Value Ref Range    Sodium Level 133 (L) 136 - 145 mmol/L    Potassium Level 3.6 3.5 - 5.1 mmol/L    Chloride 99 98 - 107 mmol/L    Carbon Dioxide 26 23 - 31 mmol/L    Glucose Level 122 (H) 82 - 115 mg/dL    Blood Urea Nitrogen 11.9 9.8 - 20.1 mg/dL    Creatinine 0.50 (L) 0.55 - 1.02 mg/dL    Calcium Level Total 8.5 8.4 - 10.2 mg/dL    Protein Total 5.9 5.8 - 7.6 gm/dL    Albumin Level 3.0 (L) 3.4 - 4.8 g/dL    Globulin 2.9 2.4 - 3.5 gm/dL    Albumin/Globulin Ratio 1.0 (L) 1.1 - 2.0 ratio    Bilirubin Total 0.9 <=1.5 mg/dL    Alkaline Phosphatase 75 40 - 150 unit/L    Alanine Aminotransferase 34 0 - 55 unit/L    Aspartate  Aminotransferase 31 5 - 34 unit/L    eGFR >60 mls/min/1.73/m2   Magnesium    Collection Time: 09/06/23  2:27 AM   Result Value Ref Range    Magnesium Level 1.90 1.60 - 2.60 mg/dL   Phosphorus    Collection Time: 09/06/23  2:27 AM   Result Value Ref Range    Phosphorus Level 2.5 2.3 - 4.7 mg/dL   CBC with Differential    Collection Time: 09/06/23  2:27 AM   Result Value Ref Range    WBC 13.06 (H) 4.50 - 11.50 x10(3)/mcL    RBC 4.58 4.20 - 5.40 x10(6)/mcL    Hgb 14.4 12.0 - 16.0 g/dL    Hct 42.5 37.0 - 47.0 %    MCV 92.8 80.0 - 94.0 fL    MCH 31.4 (H) 27.0 - 31.0 pg    MCHC 33.9 33.0 - 36.0 g/dL    RDW 13.4 11.5 - 17.0 %    Platelet 153 130 - 400 x10(3)/mcL    MPV 10.6 (H) 7.4 - 10.4 fL    Neut % 82.0 %    Lymph % 7.1 %    Mono % 10.1 %    Eos % 0.1 %    Basophil % 0.2 %    Lymph # 0.93 0.6 - 4.6 x10(3)/mcL    Neut # 10.71 (H) 2.1 - 9.2 x10(3)/mcL    Mono # 1.32 (H) 0.1 - 1.3 x10(3)/mcL    Eos # 0.01 0 - 0.9 x10(3)/mcL    Baso # 0.03 <=0.2 x10(3)/mcL    IG# 0.06 (H) 0 - 0.04 x10(3)/mcL    IG% 0.5 %    NRBC% 0.0 %   CBC with Differential    Collection Time: 09/07/23  4:45 AM   Result Value Ref Range    WBC 12.09 (H) 4.50 - 11.50 x10(3)/mcL    RBC 4.09 (L) 4.20 - 5.40 x10(6)/mcL    Hgb 13.2 12.0 - 16.0 g/dL    Hct 38.9 37.0 - 47.0 %    MCV 95.1 (H) 80.0 - 94.0 fL    MCH 32.3 (H) 27.0 - 31.0 pg    MCHC 33.9 33.0 - 36.0 g/dL    RDW 13.5 11.5 - 17.0 %    Platelet 161 130 - 400 x10(3)/mcL    MPV 10.6 (H) 7.4 - 10.4 fL    Neut % 80.3 %    Lymph % 7.7 %    Mono % 10.7 %    Eos % 0.6 %    Basophil % 0.2 %    Lymph # 0.93 0.6 - 4.6 x10(3)/mcL    Neut # 9.72 (H) 2.1 - 9.2 x10(3)/mcL    Mono # 1.29 0.1 - 1.3 x10(3)/mcL    Eos # 0.07 0 - 0.9 x10(3)/mcL    Baso # 0.02 <=0.2 x10(3)/mcL    IG# 0.06 (H) 0 - 0.04 x10(3)/mcL    IG% 0.5 %    NRBC% 0.0 %   Echo    Collection Time: 09/07/23  1:16 PM   Result Value Ref Range    BSA 1.64 m2    LVOT stroke volume 47.41 cm3    LVIDd 4.60 3.5 - 6.0 cm    LV Systolic Volume 37.90 mL    LV  Systolic Volume Index 23.0 mL/m2    LVIDs 3.10 2.1 - 4.0 cm    LV Diastolic Volume 97.30 mL    LV Diastolic Volume Index 58.97 mL/m2    IVS 0.90 0.6 - 1.1 cm    LVOT diameter 2.00 cm    LVOT area 3.1 cm2    FS 33 28 - 44 %    Left Ventricle Relative Wall Thickness 0.48 cm    Posterior Wall 1.10 0.6 - 1.1 cm    LV mass 158.81 g    LV Mass Index 96 g/m2    MV Peak E Graeme 0.60 m/s    TDI LATERAL 0.15 m/s    TDI SEPTAL 0.10 m/s    E/E' ratio 4.80 m/s    MV Peak A Graeme 0.56 m/s    TR Max Graeme 2.62 m/s    E/A ratio 1.07     E wave deceleration time 143.00 msec    LV SEPTAL E/E' RATIO 6.00 m/s    LV LATERAL E/E' RATIO 4.00 m/s    LVOT peak graeme 0.95 m/s    Left Ventricular Outflow Tract Mean Velocity 0.62 cm/s    Left Ventricular Outflow Tract Mean Gradient 2.00 mmHg    LA size 3.80 cm    RVDD 2.80 cm    TAPSE 2.79 cm    AV mean gradient 4 mmHg    AV peak gradient 7 mmHg    Ao peak graeme 1.32 m/s    Ao VTI 21.70 cm    LVOT peak VTI 15.10 cm    AV valve area 2.18 cm²    AV Velocity Ratio 0.72     AV index (prosthetic) 0.70     AILEEN by Velocity Ratio 2.26 cm²    MV mean gradient 1 mmHg    MV peak gradient 2 mmHg    MV stenosis pressure 1/2 time 47.00 ms    MV valve area p 1/2 method 4.68 cm2    MV valve area by continuity eq 3.08 cm2    MV VTI 15.4 cm    Triscuspid Valve Regurgitation Peak Gradient 27 mmHg    PV PEAK VELOCITY 0.95 m/s    PV peak gradient 4 mmHg    Mean e' 0.13 m/s    ZLVIDS 0.61     ZLVIDD -0.07    Comprehensive Metabolic Panel    Collection Time: 09/08/23  5:09 AM   Result Value Ref Range    Sodium Level 132 (L) 136 - 145 mmol/L    Potassium Level 4.0 3.5 - 5.1 mmol/L    Chloride 99 98 - 107 mmol/L    Carbon Dioxide 23 23 - 31 mmol/L    Glucose Level 150 (H) 82 - 115 mg/dL    Blood Urea Nitrogen 11.2 9.8 - 20.1 mg/dL    Creatinine 0.48 (L) 0.55 - 1.02 mg/dL    Calcium Level Total 7.8 (L) 8.4 - 10.2 mg/dL    Protein Total 5.2 (L) 5.8 - 7.6 gm/dL    Albumin Level 2.4 (L) 3.4 - 4.8 g/dL    Globulin 2.8 2.4 - 3.5  gm/dL    Albumin/Globulin Ratio 0.9 (L) 1.1 - 2.0 ratio    Bilirubin Total 0.7 <=1.5 mg/dL    Alkaline Phosphatase 100 40 - 150 unit/L    Alanine Aminotransferase 50 0 - 55 unit/L    Aspartate Aminotransferase 46 (H) 5 - 34 unit/L    eGFR >60 mls/min/1.73/m2   CBC with Differential    Collection Time: 09/08/23  5:09 AM   Result Value Ref Range    WBC 12.74 (H) 4.50 - 11.50 x10(3)/mcL    RBC 3.97 (L) 4.20 - 5.40 x10(6)/mcL    Hgb 12.5 12.0 - 16.0 g/dL    Hct 37.2 37.0 - 47.0 %    MCV 93.7 80.0 - 94.0 fL    MCH 31.5 (H) 27.0 - 31.0 pg    MCHC 33.6 33.0 - 36.0 g/dL    RDW 13.4 11.5 - 17.0 %    Platelet 167 130 - 400 x10(3)/mcL    MPV 10.9 (H) 7.4 - 10.4 fL    Neut % 81.1 %    Lymph % 6.8 %    Mono % 10.8 %    Eos % 0.5 %    Basophil % 0.3 %    Lymph # 0.87 0.6 - 4.6 x10(3)/mcL    Neut # 10.32 (H) 2.1 - 9.2 x10(3)/mcL    Mono # 1.38 (H) 0.1 - 1.3 x10(3)/mcL    Eos # 0.07 0 - 0.9 x10(3)/mcL    Baso # 0.04 <=0.2 x10(3)/mcL    IG# 0.06 (H) 0 - 0.04 x10(3)/mcL    IG% 0.5 %    NRBC% 0.0 %       Telemetry:  Sinus Tachycardia    Physical Exam  Vitals and nursing note reviewed.   Constitutional:       General: She is not in acute distress.     Appearance: She is ill-appearing.   HENT:      Head: Normocephalic.   Cardiovascular:      Rate and Rhythm: Regular rhythm. Tachycardia present.      Heart sounds: Normal heart sounds.      Comments: Sinus Tachycardia 's  Pulmonary:      Effort: Pulmonary effort is normal. No respiratory distress.      Comments: NC Oxygen  Abdominal:      Comments: NG Tube   Musculoskeletal:      Cervical back: Neck supple.      Right lower leg: No edema.      Left lower leg: No edema.   Skin:     General: Skin is warm and dry.   Neurological:      Comments: Lethargic to Obtunded. Currently Sleeping, Awakens Eyes spontaneously. Right Sided Paresis. Some incoherent speech.       Current Inpatient Medications:    Current Facility-Administered Medications:     0.9%  NaCl infusion, , Intravenous,  Continuous, Irma Roberts MD, Last Rate: 100 mL/hr at 09/07/23 2106, New Bag at 09/07/23 2106    amiodarone 360 mg/200 mL (1.8 mg/mL) infusion, 0.5 mg/min, Intravenous, Continuous, Lety Gracia FNP, Last Rate: 16.7 mL/hr at 09/08/23 0331, 0.5 mg/min at 09/08/23 0331    labetaloL injection 10 mg, 10 mg, Intravenous, Q4H PRN, Ramiro De Santiago DO, 10 mg at 09/05/23 0428    levETIRAcetam tablet 500 mg, 500 mg, Oral, BID, Gokul Mejia MD, 500 mg at 09/07/23 2051    metoprolol injection 5 mg, 5 mg, Intravenous, Q4H PRN, Kvng, Rein T, FNP, 5 mg at 09/06/23 2256    metoprolol tartrate (LOPRESSOR) tablet 25 mg, 25 mg, Per NG tube, BID, Kvng Rein T, FNP, 25 mg at 09/07/23 2051    QUEtiapine tablet 50 mg, 50 mg, Oral, BID, Chriss Becerra DO, 50 mg at 09/07/23 2051    VTE Risk Mitigation (From admission, onward)           Ordered     Reason for No Pharmacological VTE Prophylaxis  Once        Question:  Reasons:  Answer:  Risk of Bleeding    08/31/23 1659     IP VTE HIGH RISK PATIENT  Once         08/31/23 1659     Place sequential compression device  Until discontinued         08/31/23 1659                  Assessment:   Atrial Flutter with RVR (New Onset)- Now Sinus Tachycardia on Amiodarone Infusion    - [id number]: 2 (Gender/Age)    - Not on Anticoagulation in the Setting of Hemorrhagic CVA    - TSH Normal/Troponin Normal    - EF 55-60%  Acute Encephalopathy with Intermittent Agitation  Left Sided Hemorrhagic CVA due to Left Sided Temporal AV Malformation  Right Sided Paresis with Aphasia  Acute Hypoxemic Respiratory Failure- Requiring NC Oxygen Support  Leukocytosis  Oropharyngeal Dysphagia Requiring Tube Feedings  DNR Status    Plan:   Transition to Oral Tapered Amiodarone: 400 Mg BID x 3 Days, then 200 Mg BID x 3 Days, then 200 Mg Daily Thereafter (Short Term Use).  Optimize Beta Blockade  Utilize IV Lopressor for HR Control if Needed, for Goal HR < 120.  Not on Anticoagulation in the Setting of  Hemorrhagic CVA Risk > Benefit  Prognosis appears guarded.  Will be available. Please call if needed.    ELIANA Samson  Cardiology  [facility] - 4th Floor Medical Telemetry  09/08/2023     I have seen the patient, reviewed the Nurse Practitioner's note, assessment and plan. I have personally interviewed and examined the patient at bedside and agree with the findings. Medical decision making listed above were done under my guidance.  Prognosis is guarded  Transition to oral amiodarone   No need to do any further cardiac tests  Will sign off, please call us back if needed.

## 2023-09-08 NOTE — PT/OT/SLP PROGRESS
Occupational Therapy   Treatment    Name: Bozena Shelton  MRN: [id number]  Admitting Diagnosis:  Intracerebral hemorrhage       Recommendations:     Discharge Recommendations: LTACH (long-term acute care hospital), nursing facility, skilled  Discharge Equipment Recommendations:   (TBD)  Barriers to discharge:       Assessment:     Bozena Shelton is a 73 y.o. female with a medical diagnosis of intracerebral hemorrhage.  She presents with increased lethargy and very difficult to wake for session. RN reports pt given ativan 2/2 agitation. Performance deficits affecting function are weakness, decreased upper extremity function, decreased ROM, impaired endurance, impaired balance, decreased lower extremity function, impaired coordination, decreased safety awareness, impaired self care skills, impaired functional mobility, impaired cognition.     Rehab Prognosis:  Fair; patient would benefit from acute skilled OT services to address these deficits and reach maximum level of function.       Plan:     Patient to be seen 6 x/week to address the above listed problems via self-care/home management, therapeutic activities, therapeutic exercises  Plan of Care Expires: 10/05/23  Plan of Care Reviewed with: patient    Subjective     Pain/Comfort:  Pain Rating 1:  (unabe to verbalize)    Objective:     Communicated with: RN prior to session.  Patient found right sidelying with blood pressure cuff, NG tube, PureWick, telemetry, pulse ox (continuous), oxygen, peripheral IV (roll belt, L mitten) upon OT entry to room.    General Precautions: Standard, aphasia, fall, seizure, NPO (BP<140/90)    Orthopedic Precautions:N/A  Braces: N/A  Respiratory Status: Nasal cannula, flow 3 L/min  Vital Signs: Blood Pressure: 132/80     Occupational Performance:     Bed Mobility:    Patient completed Rolling/Turning to Left with  total assistance  Patient completed Rolling/Turning to Right with total assistance     Functional  Mobility/Transfers:  Functional Mobility: not safe to attempt 2/2 lethargy    Activities of Daily Living:  Toileting: Max A for pericare at bed level 2/2 soiled with urine. Tolerated lateral rolling to change chux pads  UE dressing: Max A to don/doff gown    Therapeutic Positioning    OT interventions performed during the course of today's session in an effort to prevent and/or reduce acquired pressure injuries:   Therapeutic positioning completed . Pt left L side lying on wedge with RUE elevated on pillow and pillow between knees to prevent breakdown.     Skin assessment: all bony prominences were assessed    Findings: no redness or breakdown noted      Patient left left sidelying with all lines intact and RN notified. Roll belt and mitten donned.     GOALS:   Multidisciplinary Problems       Occupational Therapy Goals          Problem: Occupational Therapy    Goal Priority Disciplines Outcome Interventions   Occupational Therapy Goal     OT, PT/OT Ongoing, Progressing    Description: STG: to be met in 2 weeks  1. Pt will follow >80% of simple one step commands  2. Pt will perform grooming EOB with min A.   3. Pt will complete UB dressing with min A.  4. Pt will perform functional grasp/reach/release of items >80% of trials in prep for increased participation in ADL tasks.   5. Pt will perform sit<>stand with mod A x 2 in prep for ADL t/fs.  6. Pt will demo visual attention to right side >80% of time with min verbal cues.                           Time Tracking:     OT Date of Treatment: 09/08/23  OT Start Time: 1413  OT Stop Time: 1436  OT Total Time (min): 23 min    Billable Minutes:Self Care/Home Management 23    OT/CANDY: CANDY     Number of CANDY visits since last OT visit: 2    9/8/2023

## 2023-09-09 LAB
ALLENS TEST BLOOD GAS (OHS): YES
ANION GAP SERPL CALC-SCNC: 10 MEQ/L
BASE EXCESS BLD CALC-SCNC: 7.5 MMOL/L (ref -2–2)
BASOPHILS # BLD AUTO: 0.05 X10(3)/MCL
BASOPHILS NFR BLD AUTO: 0.3 %
BLOOD GAS SAMPLE TYPE (OHS): ABNORMAL
BUN SERPL-MCNC: 11.6 MG/DL (ref 9.8–20.1)
CA-I BLD-SCNC: 1.13 MMOL/L (ref 1.12–1.23)
CALCIUM SERPL-MCNC: 8 MG/DL (ref 8.4–10.2)
CHLORIDE SERPL-SCNC: 96 MMOL/L (ref 98–107)
CO2 BLDA-SCNC: 34.7 MMOL/L
CO2 SERPL-SCNC: 23 MMOL/L (ref 23–31)
COHGB MFR BLDA: 2.4 % (ref 0.5–1.5)
CREAT SERPL-MCNC: 0.47 MG/DL (ref 0.55–1.02)
CREAT/UREA NIT SERPL: 25
DRAWN BY BLOOD GAS (OHS): ABNORMAL
EOSINOPHIL # BLD AUTO: 0.07 X10(3)/MCL (ref 0–0.9)
EOSINOPHIL NFR BLD AUTO: 0.5 %
ERYTHROCYTE [DISTWIDTH] IN BLOOD BY AUTOMATED COUNT: 13.2 % (ref 11.5–17)
GFR SERPLBLD CREATININE-BSD FMLA CKD-EPI: >60 MLS/MIN/1.73/M2
GLUCOSE SERPL-MCNC: 166 MG/DL (ref 82–115)
HCO3 BLDA-SCNC: 33.2 MMOL/L (ref 22–26)
HCT VFR BLD AUTO: 36.1 % (ref 37–47)
HGB BLD-MCNC: 12.6 G/DL (ref 12–16)
IMM GRANULOCYTES # BLD AUTO: 0.08 X10(3)/MCL (ref 0–0.04)
IMM GRANULOCYTES NFR BLD AUTO: 0.6 %
LPM (OHS): 15
LYMPHOCYTES # BLD AUTO: 1.05 X10(3)/MCL (ref 0.6–4.6)
LYMPHOCYTES NFR BLD AUTO: 7.2 %
MCH RBC QN AUTO: 32.1 PG (ref 27–31)
MCHC RBC AUTO-ENTMCNC: 34.9 G/DL (ref 33–36)
MCV RBC AUTO: 92.1 FL (ref 80–94)
METHGB MFR BLDA: 1 % (ref 0.4–1.5)
MONOCYTES # BLD AUTO: 1.4 X10(3)/MCL (ref 0.1–1.3)
MONOCYTES NFR BLD AUTO: 9.6 %
NEUTROPHILS # BLD AUTO: 11.86 X10(3)/MCL (ref 2.1–9.2)
NEUTROPHILS NFR BLD AUTO: 81.8 %
NRBC BLD AUTO-RTO: 0 %
O2 HB BLOOD GAS (OHS): 82.6 % (ref 94–97)
OXYHGB MFR BLDA: 13.6 G/DL (ref 12–16)
PCO2 BLDA: 50 MMHG (ref 35–45)
PH BLDA: 7.43 [PH] (ref 7.35–7.45)
PLATELET # BLD AUTO: 207 X10(3)/MCL (ref 130–400)
PMV BLD AUTO: 10.8 FL (ref 7.4–10.4)
PO2 BLDA: 47 MMHG (ref 80–100)
POCT GLUCOSE: 194 MG/DL (ref 70–110)
POTASSIUM BLOOD GAS (OHS): 3.9 MMOL/L (ref 3.5–5)
POTASSIUM SERPL-SCNC: 4.4 MMOL/L (ref 3.5–5.1)
RBC # BLD AUTO: 3.92 X10(6)/MCL (ref 4.2–5.4)
SAMPLE SITE BLOOD GAS (OHS): ABNORMAL
SAO2 % BLDA: 83.9 %
SODIUM BLOOD GAS (OHS): 122 MMOL/L (ref 137–145)
SODIUM SERPL-SCNC: 129 MMOL/L (ref 136–145)
WBC # SPEC AUTO: 14.51 X10(3)/MCL (ref 4.5–11.5)

## 2023-09-09 PROCEDURE — 99900035 HC TECH TIME PER 15 MIN (STAT)

## 2023-09-09 PROCEDURE — 82803 BLOOD GASES ANY COMBINATION: CPT

## 2023-09-09 PROCEDURE — 80048 BASIC METABOLIC PNL TOTAL CA: CPT | Performed by: INTERNAL MEDICINE

## 2023-09-09 PROCEDURE — 99231 SBSQ HOSP IP/OBS SF/LOW 25: CPT | Mod: ,,, | Performed by: NEUROLOGICAL SURGERY

## 2023-09-09 PROCEDURE — 21400001 HC TELEMETRY ROOM

## 2023-09-09 PROCEDURE — 36600 WITHDRAWAL OF ARTERIAL BLOOD: CPT

## 2023-09-09 PROCEDURE — 99231 PR SUBSEQUENT HOSPITAL CARE,LEVL I: ICD-10-PCS | Mod: ,,, | Performed by: NEUROLOGICAL SURGERY

## 2023-09-09 PROCEDURE — 25000003 PHARM REV CODE 250: Performed by: INTERNAL MEDICINE

## 2023-09-09 PROCEDURE — 25000003 PHARM REV CODE 250: Performed by: NURSE PRACTITIONER

## 2023-09-09 PROCEDURE — 25000003 PHARM REV CODE 250: Performed by: STUDENT IN AN ORGANIZED HEALTH CARE EDUCATION/TRAINING PROGRAM

## 2023-09-09 PROCEDURE — 85025 COMPLETE CBC W/AUTO DIFF WBC: CPT

## 2023-09-09 PROCEDURE — 25000242 PHARM REV CODE 250 ALT 637 W/ HCPCS: Performed by: INTERNAL MEDICINE

## 2023-09-09 PROCEDURE — 63600175 PHARM REV CODE 636 W HCPCS: Performed by: INTERNAL MEDICINE

## 2023-09-09 PROCEDURE — 63600175 PHARM REV CODE 636 W HCPCS: Performed by: STUDENT IN AN ORGANIZED HEALTH CARE EDUCATION/TRAINING PROGRAM

## 2023-09-09 RX ORDER — IPRATROPIUM BROMIDE AND ALBUTEROL SULFATE 2.5; .5 MG/3ML; MG/3ML
3 SOLUTION RESPIRATORY (INHALATION) EVERY 6 HOURS PRN
Status: DISCONTINUED | OUTPATIENT
Start: 2023-09-09 | End: 2023-09-11 | Stop reason: HOSPADM

## 2023-09-09 RX ORDER — MORPHINE SULFATE 4 MG/ML
1 INJECTION, SOLUTION INTRAMUSCULAR; INTRAVENOUS EVERY 4 HOURS PRN
Status: DISCONTINUED | OUTPATIENT
Start: 2023-09-09 | End: 2023-09-11 | Stop reason: HOSPADM

## 2023-09-09 RX ORDER — FUROSEMIDE 10 MG/ML
20 INJECTION INTRAMUSCULAR; INTRAVENOUS ONCE
Status: COMPLETED | OUTPATIENT
Start: 2023-09-09 | End: 2023-09-09

## 2023-09-09 RX ADMIN — MORPHINE SULFATE 1 MG: 4 INJECTION, SOLUTION INTRAMUSCULAR; INTRAVENOUS at 05:09

## 2023-09-09 RX ADMIN — LORAZEPAM 1 MG: 2 INJECTION INTRAMUSCULAR; INTRAVENOUS at 09:09

## 2023-09-09 RX ADMIN — IPRATROPIUM BROMIDE AND ALBUTEROL SULFATE 3 ML: 2.5; .5 SOLUTION RESPIRATORY (INHALATION) at 02:09

## 2023-09-09 RX ADMIN — METOPROLOL TARTRATE 50 MG: 50 TABLET, FILM COATED ORAL at 08:09

## 2023-09-09 RX ADMIN — LEVETIRACETAM 500 MG: 500 SOLUTION ORAL at 08:09

## 2023-09-09 RX ADMIN — FUROSEMIDE 20 MG: 10 INJECTION, SOLUTION INTRAMUSCULAR; INTRAVENOUS at 01:09

## 2023-09-09 RX ADMIN — PIPERACILLIN AND TAZOBACTAM 4.5 G: 4; .5 INJECTION, POWDER, LYOPHILIZED, FOR SOLUTION INTRAVENOUS; PARENTERAL at 01:09

## 2023-09-09 RX ADMIN — MORPHINE SULFATE 1 MG: 4 INJECTION, SOLUTION INTRAMUSCULAR; INTRAVENOUS at 06:09

## 2023-09-09 RX ADMIN — QUETIAPINE FUMARATE 50 MG: 25 TABLET ORAL at 08:09

## 2023-09-09 RX ADMIN — AMIODARONE HYDROCHLORIDE 400 MG: 200 TABLET ORAL at 08:09

## 2023-09-09 RX ADMIN — MORPHINE SULFATE 1 MG: 4 INJECTION, SOLUTION INTRAMUSCULAR; INTRAVENOUS at 11:09

## 2023-09-09 NOTE — PROGRESS NOTES
ROUNDING FOR DR. CAZARES    Patient developed pneumonia and is now hypoxic   at bedside  DNR in force  Will sign off

## 2023-09-09 NOTE — PLAN OF CARE
Problem: Adult Inpatient Plan of Care  Goal: Plan of Care Review  Outcome: Ongoing, Not Progressing  Goal: Patient-Specific Goal (Individualized)  Outcome: Ongoing, Not Progressing  Goal: Absence of Hospital-Acquired Illness or Injury  Outcome: Ongoing, Not Progressing  Goal: Optimal Comfort and Wellbeing  Outcome: Ongoing, Not Progressing  Goal: Readiness for Transition of Care  Outcome: Ongoing, Not Progressing     Problem: Infection  Goal: Absence of Infection Signs and Symptoms  Outcome: Ongoing, Not Progressing     Problem: Skin Injury Risk Increased  Goal: Skin Health and Integrity  Outcome: Ongoing, Not Progressing     Problem: Fall Injury Risk  Goal: Absence of Fall and Fall-Related Injury  Outcome: Ongoing, Not Progressing     Problem: Restraint, Nonbehavioral (Nonviolent)  Goal: Absence of Harm or Injury  Outcome: Ongoing, Not Progressing     Problem: Communication Impairment (Mechanical Ventilation, Invasive)  Goal: Effective Communication  Outcome: Ongoing, Not Progressing     Problem: Device-Related Complication Risk (Mechanical Ventilation, Invasive)  Goal: Optimal Device Function  Outcome: Ongoing, Not Progressing     Problem: Inability to Wean (Mechanical Ventilation, Invasive)  Goal: Mechanical Ventilation Liberation  Outcome: Ongoing, Not Progressing     Problem: Nutrition Impairment (Mechanical Ventilation, Invasive)  Goal: Optimal Nutrition Delivery  Outcome: Ongoing, Not Progressing     Problem: Skin and Tissue Injury (Mechanical Ventilation, Invasive)  Goal: Absence of Device-Related Skin and Tissue Injury  Outcome: Ongoing, Not Progressing     Problem: Ventilator-Induced Lung Injury (Mechanical Ventilation, Invasive)  Goal: Absence of Ventilator-Induced Lung Injury  Outcome: Ongoing, Not Progressing     Problem: Communication Impairment (Artificial Airway)  Goal: Effective Communication  Outcome: Ongoing, Not Progressing     Problem: Device-Related Complication Risk (Artificial  Airway)  Goal: Optimal Device Function  Outcome: Ongoing, Not Progressing     Problem: Skin and Tissue Injury (Artificial Airway)  Goal: Absence of Device-Related Skin or Tissue Injury  Outcome: Ongoing, Not Progressing     Problem: Noninvasive Ventilation Acute  Goal: Effective Unassisted Ventilation and Oxygenation  Outcome: Ongoing, Not Progressing     Problem: Adjustment to Illness (Stroke, Hemorrhagic)  Goal: Optimal Coping  Outcome: Ongoing, Not Progressing     Problem: Bowel Elimination Impaired (Stroke, Hemorrhagic)  Goal: Effective Bowel Elimination  Outcome: Ongoing, Not Progressing     Problem: Cerebral Tissue Perfusion (Stroke, Hemorrhagic)  Goal: Optimal Cerebral Tissue Perfusion  Outcome: Ongoing, Not Progressing     Problem: Cognitive Impairment (Stroke, Hemorrhagic)  Goal: Optimal Cognitive Function  Outcome: Ongoing, Not Progressing     Problem: Communication Impairment (Stroke, Hemorrhagic)  Goal: Effective Communication Skills  Outcome: Ongoing, Not Progressing     Problem: Functional Ability Impaired (Stroke, Hemorrhagic)  Goal: Optimal Functional Ability  Outcome: Ongoing, Not Progressing     Problem: Pain (Stroke, Hemorrhagic)  Goal: Acceptable Pain Control  Outcome: Ongoing, Not Progressing     Problem: Respiratory Compromise (Stroke, Hemorrhagic)  Goal: Effective Oxygenation and Ventilation  Outcome: Ongoing, Not Progressing     Problem: Sensorimotor Impairment (Stroke, Hemorrhagic)  Goal: Improved Sensorimotor Function  Outcome: Ongoing, Not Progressing     Problem: Swallowing Impairment (Stroke, Hemorrhagic)  Goal: Optimal Eating and Swallowing Without Aspiration  Outcome: Ongoing, Not Progressing     Problem: Urinary Elimination Impaired (Stroke, Hemorrhagic)  Goal: Effective Urinary Elimination  Outcome: Ongoing, Not Progressing     Problem: Impaired Wound Healing  Goal: Optimal Wound Healing  Outcome: Ongoing, Not Progressing

## 2023-09-10 LAB
ANION GAP SERPL CALC-SCNC: 13 MEQ/L
BASOPHILS # BLD AUTO: 0.1 X10(3)/MCL
BASOPHILS NFR BLD AUTO: 0.4 %
BUN SERPL-MCNC: 17.1 MG/DL (ref 9.8–20.1)
CALCIUM SERPL-MCNC: 8.4 MG/DL (ref 8.4–10.2)
CHLORIDE SERPL-SCNC: 91 MMOL/L (ref 98–107)
CO2 SERPL-SCNC: 26 MMOL/L (ref 23–31)
CREAT SERPL-MCNC: 0.53 MG/DL (ref 0.55–1.02)
CREAT/UREA NIT SERPL: 32
EOSINOPHIL # BLD AUTO: 0.1 X10(3)/MCL (ref 0–0.9)
EOSINOPHIL NFR BLD AUTO: 0.4 %
ERYTHROCYTE [DISTWIDTH] IN BLOOD BY AUTOMATED COUNT: 12.9 % (ref 11.5–17)
GFR SERPLBLD CREATININE-BSD FMLA CKD-EPI: >60 MLS/MIN/1.73/M2
GLUCOSE SERPL-MCNC: 122 MG/DL (ref 82–115)
HCT VFR BLD AUTO: 38 % (ref 37–47)
HGB BLD-MCNC: 13.3 G/DL (ref 12–16)
IMM GRANULOCYTES # BLD AUTO: 0.15 X10(3)/MCL (ref 0–0.04)
IMM GRANULOCYTES NFR BLD AUTO: 0.7 %
LYMPHOCYTES # BLD AUTO: 0.67 X10(3)/MCL (ref 0.6–4.6)
LYMPHOCYTES NFR BLD AUTO: 3 %
MCH RBC QN AUTO: 32.7 PG (ref 27–31)
MCHC RBC AUTO-ENTMCNC: 35 G/DL (ref 33–36)
MCV RBC AUTO: 93.4 FL (ref 80–94)
MONOCYTES # BLD AUTO: 1.06 X10(3)/MCL (ref 0.1–1.3)
MONOCYTES NFR BLD AUTO: 4.7 %
NEUTROPHILS # BLD AUTO: 20.48 X10(3)/MCL (ref 2.1–9.2)
NEUTROPHILS NFR BLD AUTO: 90.8 %
NRBC BLD AUTO-RTO: 0 %
PLATELET # BLD AUTO: 236 X10(3)/MCL (ref 130–400)
PMV BLD AUTO: 11 FL (ref 7.4–10.4)
POTASSIUM SERPL-SCNC: 4.7 MMOL/L (ref 3.5–5.1)
RBC # BLD AUTO: 4.07 X10(6)/MCL (ref 4.2–5.4)
SODIUM SERPL-SCNC: 130 MMOL/L (ref 136–145)
WBC # SPEC AUTO: 22.56 X10(3)/MCL (ref 4.5–11.5)

## 2023-09-10 PROCEDURE — 85025 COMPLETE CBC W/AUTO DIFF WBC: CPT

## 2023-09-10 PROCEDURE — 25000003 PHARM REV CODE 250: Performed by: INTERNAL MEDICINE

## 2023-09-10 PROCEDURE — 63600175 PHARM REV CODE 636 W HCPCS: Performed by: INTERNAL MEDICINE

## 2023-09-10 PROCEDURE — 94761 N-INVAS EAR/PLS OXIMETRY MLT: CPT

## 2023-09-10 PROCEDURE — 21400001 HC TELEMETRY ROOM

## 2023-09-10 PROCEDURE — 25000003 PHARM REV CODE 250: Performed by: NURSE PRACTITIONER

## 2023-09-10 PROCEDURE — 80048 BASIC METABOLIC PNL TOTAL CA: CPT | Performed by: INTERNAL MEDICINE

## 2023-09-10 PROCEDURE — 27000221 HC OXYGEN, UP TO 24 HOURS

## 2023-09-10 RX ADMIN — AMIODARONE HYDROCHLORIDE 400 MG: 200 TABLET ORAL at 08:09

## 2023-09-10 RX ADMIN — MORPHINE SULFATE 1 MG: 4 INJECTION, SOLUTION INTRAMUSCULAR; INTRAVENOUS at 03:09

## 2023-09-10 RX ADMIN — LEVETIRACETAM 500 MG: 100 INJECTION, SOLUTION INTRAVENOUS at 08:09

## 2023-09-10 RX ADMIN — MORPHINE SULFATE 1 MG: 4 INJECTION, SOLUTION INTRAMUSCULAR; INTRAVENOUS at 05:09

## 2023-09-10 RX ADMIN — METOPROLOL TARTRATE 50 MG: 50 TABLET, FILM COATED ORAL at 08:09

## 2023-09-10 RX ADMIN — MORPHINE SULFATE 1 MG: 4 INJECTION, SOLUTION INTRAMUSCULAR; INTRAVENOUS at 09:09

## 2023-09-10 NOTE — PLAN OF CARE
Rec'd message from nurse that skylar wants to meet / [facility] rep and [facility] rep, inpatient hospice facilities, to have pt evaluated for IP hospice today.  Referral sent.  Called both facility answering services and gave them pt info, waiting on rep to call me back.

## 2023-09-11 VITALS
BODY MASS INDEX: 21.6 KG/M2 | SYSTOLIC BLOOD PRESSURE: 143 MMHG | RESPIRATION RATE: 18 BRPM | WEIGHT: 129.63 LBS | DIASTOLIC BLOOD PRESSURE: 78 MMHG | TEMPERATURE: 98 F | OXYGEN SATURATION: 91 % | HEIGHT: 65 IN | HEART RATE: 122 BPM

## 2023-09-11 LAB
BASOPHILS # BLD AUTO: 0.04 X10(3)/MCL
BASOPHILS NFR BLD AUTO: 0.2 %
EOSINOPHIL # BLD AUTO: 0.03 X10(3)/MCL (ref 0–0.9)
EOSINOPHIL NFR BLD AUTO: 0.2 %
ERYTHROCYTE [DISTWIDTH] IN BLOOD BY AUTOMATED COUNT: 13.1 % (ref 11.5–17)
HCT VFR BLD AUTO: 37.1 % (ref 37–47)
HGB BLD-MCNC: 12.8 G/DL (ref 12–16)
IMM GRANULOCYTES # BLD AUTO: 0.09 X10(3)/MCL (ref 0–0.04)
IMM GRANULOCYTES NFR BLD AUTO: 0.5 %
INR PPP: 1.2
LYMPHOCYTES # BLD AUTO: 0.45 X10(3)/MCL (ref 0.6–4.6)
LYMPHOCYTES NFR BLD AUTO: 2.3 %
MCH RBC QN AUTO: 32.7 PG (ref 27–31)
MCHC RBC AUTO-ENTMCNC: 34.5 G/DL (ref 33–36)
MCV RBC AUTO: 94.9 FL (ref 80–94)
MONOCYTES # BLD AUTO: 0.72 X10(3)/MCL (ref 0.1–1.3)
MONOCYTES NFR BLD AUTO: 3.7 %
NEUTROPHILS # BLD AUTO: 17.98 X10(3)/MCL (ref 2.1–9.2)
NEUTROPHILS NFR BLD AUTO: 93.1 %
NRBC BLD AUTO-RTO: 0 %
PLATELET # BLD AUTO: 317 X10(3)/MCL (ref 130–400)
PMV BLD AUTO: 10.2 FL (ref 7.4–10.4)
PROTHROMBIN TIME: 15.1 SECONDS (ref 12.5–14.5)
RBC # BLD AUTO: 3.91 X10(6)/MCL (ref 4.2–5.4)
WBC # SPEC AUTO: 19.31 X10(3)/MCL (ref 4.5–11.5)

## 2023-09-11 PROCEDURE — 63600175 PHARM REV CODE 636 W HCPCS: Performed by: STUDENT IN AN ORGANIZED HEALTH CARE EDUCATION/TRAINING PROGRAM

## 2023-09-11 PROCEDURE — 85610 PROTHROMBIN TIME: CPT | Performed by: PHYSICIAN ASSISTANT

## 2023-09-11 PROCEDURE — 63600175 PHARM REV CODE 636 W HCPCS: Performed by: INTERNAL MEDICINE

## 2023-09-11 PROCEDURE — 85025 COMPLETE CBC W/AUTO DIFF WBC: CPT

## 2023-09-11 RX ORDER — PROPOFOL 10 MG/ML
VIAL (ML) INTRAVENOUS
Status: DISPENSED
Start: 2023-09-11 | End: 2023-09-11

## 2023-09-11 RX ORDER — LIDOCAINE HYDROCHLORIDE 10 MG/ML
INJECTION, SOLUTION EPIDURAL; INFILTRATION; INTRACAUDAL; PERINEURAL
Status: DISPENSED
Start: 2023-09-11 | End: 2023-09-11

## 2023-09-11 RX ADMIN — LORAZEPAM 1 MG: 2 INJECTION INTRAMUSCULAR; INTRAVENOUS at 01:09

## 2023-09-11 RX ADMIN — MORPHINE SULFATE 1 MG: 4 INJECTION, SOLUTION INTRAMUSCULAR; INTRAVENOUS at 02:09

## 2023-09-11 NOTE — PLAN OF CARE
09/11/23 1553   Final Note   Assessment Type Final Discharge Note   [facility]   Hospital Resources/Appts/Education Provided Provided patient/caregiver with written discharge plan information   Post-Acute Status   Post-Acute Authorization Hospice   Hospice Status Set-up Complete/Auth obtained   Discharge Delays None known at this time     Patient chose [facility] for placement. [facility] will transport. Clinical information faxed and copy sent with Ambulance service.

## 2023-09-11 NOTE — PROGRESS NOTES
Inpatient Nutrition Assessment    Admit Date: 8/30/2023   Total duration of encounter: 12 days     Nutrition Recommendation/Prescription     Aggressive nutrition interventions no longer warranted, consult RD as needed.    Communication of Recommendations: reviewed with nurse    Nutrition Assessment     Malnutrition Assessment/Nutrition-Focused Physical Exam    Malnutrition Context: chronic illness  Malnutrition Level: severe  Energy Intake (Malnutrition):  (unable to eval)  Weight Loss (Malnutrition):  (unable to eval)  Subcutaneous Fat (Malnutrition): severe depletion  Orbital Region (Subcutaneous Fat Loss): severe depletion  Upper Arm Region (Subcutaneous Fat Loss): severe depletion     Muscle Mass (Malnutrition): severe depletion  Gnosticism Region (Muscle Loss): severe depletion  Clavicle Bone Region (Muscle Loss): severe depletion  Clavicle and Acromion Bone Region (Muscle Loss): severe depletion                 Fluid Accumulation (Malnutrition):  (does not meet criteria)        A minimum of two characteristics is recommended for diagnosis of either severe or non-severe malnutrition.    Chart Review    Reason Seen: continuous nutrition monitoring, physician consult for assess dietary needs, and follow-up    Malnutrition Screening Tool Results   Have you recently lost weight without trying?: UnsureNot available.  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 into the superior sagittal sinus  Sinus tachycardia to dehydration  Borderline low blood pressure   Hypoxic respiratory failure on 2 L of oxygen  Elevated leukocytosis, trending upwards  Oropharyngeal dysphagia on NG tube feeds     Relevant Medical History: no  known hx    Scheduled Meds:   amiodarone  200 mg Per NG tube BID    Followed by    [START ON 9/14/2023] amiodarone  200 mg Per NG tube Daily    doxycycline (VIBRAMYCIN) IVPB  100 mg Intravenous Q12H    levETIRAcetam (Keppra) IV (PEDS and ADULTS)  500 mg Intravenous Q12H    LIDOcaine (PF) 10 mg/ml (1%)        metoprolol tartrate  50 mg Per NG tube BID    piperacillin-tazobactam (Zosyn) IV (PEDS and ADULTS) (extended infusion is not appropriate)  4.5 g Intravenous Q8H    propofol         Calorie Containing IV Medications: no significant kcals from medications at this time    Recent Labs   Lab 09/05/23  0207 09/06/23  0227 09/08/23  0509 09/09/23  0502 09/10/23  0507    133* 132* 129* 130*   K 3.5 3.6 4.0 4.4 4.7   CALCIUM 8.7 8.5 7.8* 8.0* 8.4   PHOS 1.9* 2.5  --   --   --    MG 1.60 1.90  --   --   --    CHLORIDE 101 99 99 96* 91*   CO2 27 26 23 23 26   BUN 11.9 11.9 11.2 11.6 17.1   CREATININE 0.52* 0.50* 0.48* 0.47* 0.53*   GLUCOSE 134* 122* 150* 166* 122*   BILITOT 1.0 0.9 0.7  --   --    ALKPHOS 77 75 100  --   --    ALT 32 34 50  --   --    AST 34 31 46*  --   --    ALBUMIN 3.2* 3.0* 2.4*  --   --        Diet/PN Order: Diet NPO  Oral Supplement Order: none  Tube Feeding Order:  Isosource HN at 75 ml/hr (see below for calculation)  Appetite/Oral Intake: NPO/not applicable  Factors Affecting Nutritional Intake: impaired cognitive status/motor control, difficulty/impaired swallowing, and NPO  Food/[place]/Cultural Preferences: unable to obtain  Food Allergies: none reported    Wound(s):  suspected hospital acquired purple/maroon altered skin integrity to sacrum per documentation    Comments    9/1/23: Discussed with RN. Will provide tube feeding recommendations for when appropriate to start tube feeding. Receiving kcal from meds. Possible plans for extubation post procedure today. Will likely place NG prior to extubation per RN.     9/3/23: Pt now extubated. Plans for starting TF per RN. NG to be placed. Pt  "not able to verify subjective info due to mental status.     23 Tube feeding at goal.    23 Patient's family has decided to pursue inpatient hospice, tube feeding off for comfort.    Anthropometrics    Height: 5' 5" (165.1 cm) Height Method: Estimated  Last Weight: 58.8 kg (129 lb 10.1 oz) (23) Weight Method: Bed Scale  BMI (Calculated): 21.6  BMI Classification: underweight (BMI less than 22 if >65 years of age)     Ideal Body Weight (IBW), Female: 125 lb     % Ideal Body Weight, Female (lb): 103.7 %                             Usual Weight Provided By: unable to obtain usual weight    Wt Readings from Last 5 Encounters:   23 58.8 kg (129 lb 10.1 oz)     Weight Change(s) Since Admission: +8.8 kg x 1 week, likely error  Wt Readings from Last 1 Encounters:   23 58.8 kg (129 lb 10.1 oz)   23 50 kg (110 lb 3.7 oz)   Admit Weight: 50 kg (110 lb 3.7 oz) (23)    Estimated Needs    Weight Used For Calorie Calculations: 50 kg (110 lb 3.7 oz)  Energy Calorie Requirements (kcal): 1730kcal (1.2 stress factor +500kcal)  Energy Need Method: [place]-St Jeor  Weight Used For Protein Calculations: 50 kg (110 lb 3.7 oz)  Protein Requirements: 75gm (1.5g/kg)  Fluid Requirements (mL): 1730ml (1ml/kcal)  Temp (24hrs), Av.1 °F (37.3 °C), Min:98.8 °F (37.1 °C), Max:99.4 °F (37.4 °C)  Vtot (L/Min) for [place] Equation Calculation: 7.1    Evaluation of Received Nutrient Intake    Calories: not meeting estimated needs  Protein: not meeting estimated needs    Patient Education Not applicable.    Nutrition Diagnosis     PES: Malnutrition related to chronic illness as evidenced by muscle wasting, fat loss. (continues)    Interventions/Goals     Intervention(s): collaboration with other providers  Goal: Meet greater than 75% of nutritional needs by follow-up. (goal discontinued)    Monitoring & Evaluation     Dietitian will monitor energy intake and weight.  Nutrition " Risk/Follow-Up: low (follow-up in 5-7 days)   Please consult if re-assessment needed sooner.

## 2023-09-11 NOTE — PLAN OF CARE
Patient spouse has met with representatives from [facility] and [facility]. Awaiting decision for placement.

## 2023-09-11 NOTE — PT/OT/SLP PROGRESS
Physical Therapy      Patient Name:  Bozena Shelton   MRN:  [id number]    Patient not seen today secondary to family pursuing inpatient hospice  . PT services no longer warranted, will sign off.

## 2023-09-11 NOTE — PLAN OF CARE
Problem: Adult Inpatient Plan of Care  Goal: Plan of Care Review  Outcome: Met  Goal: Patient-Specific Goal (Individualized)  Outcome: Met  Goal: Absence of Hospital-Acquired Illness or Injury  Outcome: Met  Goal: Optimal Comfort and Wellbeing  Outcome: Met  Goal: Readiness for Transition of Care  Outcome: Met     Problem: Infection  Goal: Absence of Infection Signs and Symptoms  Outcome: Met     Problem: Skin Injury Risk Increased  Goal: Skin Health and Integrity  Outcome: Met     Problem: Fall Injury Risk  Goal: Absence of Fall and Fall-Related Injury  Outcome: Met     Problem: Restraint, Nonbehavioral (Nonviolent)  Goal: Absence of Harm or Injury  Outcome: Met     Problem: Communication Impairment (Mechanical Ventilation, Invasive)  Goal: Effective Communication  Outcome: Met     Problem: Device-Related Complication Risk (Mechanical Ventilation, Invasive)  Goal: Optimal Device Function  Outcome: Met     Problem: Inability to Wean (Mechanical Ventilation, Invasive)  Goal: Mechanical Ventilation Liberation  Outcome: Met     Problem: Nutrition Impairment (Mechanical Ventilation, Invasive)  Goal: Optimal Nutrition Delivery  Outcome: Met     Problem: Skin and Tissue Injury (Mechanical Ventilation, Invasive)  Goal: Absence of Device-Related Skin and Tissue Injury  Outcome: Met     Problem: Ventilator-Induced Lung Injury (Mechanical Ventilation, Invasive)  Goal: Absence of Ventilator-Induced Lung Injury  Outcome: Met     Problem: Communication Impairment (Artificial Airway)  Goal: Effective Communication  Outcome: Met     Problem: Device-Related Complication Risk (Artificial Airway)  Goal: Optimal Device Function  Outcome: Met     Problem: Skin and Tissue Injury (Artificial Airway)  Goal: Absence of Device-Related Skin or Tissue Injury  Outcome: Met     Problem: Noninvasive Ventilation Acute  Goal: Effective Unassisted Ventilation and Oxygenation  Outcome: Met     Problem: Adjustment to Illness (Stroke,  Hemorrhagic)  Goal: Optimal Coping  Outcome: Met     Problem: Bowel Elimination Impaired (Stroke, Hemorrhagic)  Goal: Effective Bowel Elimination  Outcome: Met     Problem: Cerebral Tissue Perfusion (Stroke, Hemorrhagic)  Goal: Optimal Cerebral Tissue Perfusion  Outcome: Met     Problem: Cognitive Impairment (Stroke, Hemorrhagic)  Goal: Optimal Cognitive Function  Outcome: Met     Problem: Communication Impairment (Stroke, Hemorrhagic)  Goal: Effective Communication Skills  Outcome: Met     Problem: Functional Ability Impaired (Stroke, Hemorrhagic)  Goal: Optimal Functional Ability  Outcome: Met     Problem: Pain (Stroke, Hemorrhagic)  Goal: Acceptable Pain Control  Outcome: Met     Problem: Respiratory Compromise (Stroke, Hemorrhagic)  Goal: Effective Oxygenation and Ventilation  Outcome: Met     Problem: Sensorimotor Impairment (Stroke, Hemorrhagic)  Goal: Improved Sensorimotor Function  Outcome: Met     Problem: Swallowing Impairment (Stroke, Hemorrhagic)  Goal: Optimal Eating and Swallowing Without Aspiration  Outcome: Met     Problem: Urinary Elimination Impaired (Stroke, Hemorrhagic)  Goal: Effective Urinary Elimination  Outcome: Met     Problem: Physical Therapy  Goal: Physical Therapy Goal  Description: Goals to be met by: 10/5/2023     Patient will increase functional independence with mobility by performin. Supine to sit with MInimal Assistance  2. Sit to stand transfer with Minimal Assistance  3. Bed to chair transfer with Minimal Assistance using LRAD  4. Gait  x 50 feet with Minimal Assistance using LRAD.  5. Pt will sit EOB for 5 min with SBA.      Outcome: Met     Problem: Occupational Therapy  Goal: Occupational Therapy Goal  Description: STG: to be met in 2 weeks  1. Pt will follow >80% of simple one step commands  2. Pt will perform grooming EOB with min A.   3. Pt will complete UB dressing with min A.  4. Pt will perform functional grasp/reach/release of items >80% of trials in prep for  increased participation in ADL tasks.   5. Pt will perform sit<>stand with mod A x 2 in prep for ADL t/fs.  6. Pt will demo visual attention to right side >80% of time with min verbal cues.      Outcome: Met     Problem: Impaired Wound Healing  Goal: Optimal Wound Healing  Outcome: Met

## 2023-09-11 NOTE — NURSING
1:30am  Patient tachycardic (-150), appears agitated, opens her eyes spontaneously without showing signs of understanding nurses' communication or providing verbal response, her breathing and SpO2 unchanged, small amount of dripping thick secretion was suctioned from her mouth, repositioning was attempted that resulted drop in SpO2 (88%), lorazepam and morphine were administered as comfort measures, SpO2 returned to range (94%), tachycardia with pulse deficit (15-20) remains

## 2023-09-11 NOTE — PROGRESS NOTES
Discussed with  Marc. He has decided to pursue hospice at this time as she has continued to decline from a respiratory stand point in the setting of ICH and developed aspiration pneumonia requiring oxygenation. I discussed that we will hold off any plan to pursue treatment of the AVM and the AVF at this time. I offered support and answer the questions regarding the AVM and the ICH.     Neuro intervention will sign off. Patient to be transferred to hospice.     Rest of the care per primary team.     Yaakov South MD  Vascular and Interventional Neurology

## 2023-09-11 NOTE — PROGRESS NOTES
[facility] Hospital Medicine Progress Note        Chief Complaint: Inpatient Follow-up for     HPI: 73 y.o. female without significant past medical history presented to [facility] on 8/30/2023 following a fall at home.   reported patient fell off the edge of the bed, but was caught by him.   denied LOC or head trauma.  EMS reported patient was confused on scene with slurred speech and right-sided facial droop.  Patient's last known normal was 2100 on 08/30/2023.  CT head revealed intraparenchymal hematoma in the left temporal lobe with intraventricular extension and subarachnoid hemorrhage of the left temporal lobe in the left sylvian fissure.  CTA head and neck revealed possible vascular malformation involving the inferior margin of the left posterior temporal intraparenchymal hemorrhage.  Neurology and Neurosurgery were consulted.  Patient was admitted to ICU for close monitoring. Patient was started on hypertonic saline at 50 cc/hour with sodium goal of 145, Keppra 500 mg BID, and Cleviprex for BP parameters below 140/90.  Patient was intubated and underwent diagnostic cerebral angiogram with Interventional Neurologist Dr. 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  CT head on 08/31 revealed no significant changes.  Embolization of the left middle meningeal artery arteriovenous fistula was unsuccessful secondary to tortuosity on 09/01 by Dr. South.  Cleviprex and hypertonic saline were discontinued on 09/02 with placement of feeding tube. Patient was extubated on 09/03 interventional neurology recommended AVM embolization in 3-4 weeks after the ICH has had time to resolve.  Patient was placed in restraints and started on Precedex for agitation.  Precedex was weaned and patient was started on Seroquel 50 mg for agitation on 9/04. Patient was cleared for downgrade out of ICU on  09/05. Hospital medicine was consulted for transition of care and further medical management.  Patient has a NG tube    Interval Hx:   Patient seen and examined this morning oxygen saturation is staying stable Objective/physical exam:  General: In no acute distress, afebrile  Chest:  Coarse breath sounds bilaterally    Heart:  Tachycardic   Abdomen: Soft, nontender, BS +  MSK: Warm,   Neurologic:  Sleepy opens her eyes  VITAL SIGNS: 24 HRS MIN & MAX LAST   Temp  Min: 98.8 °F (37.1 °C)  Max: 99.4 °F (37.4 °C) 99.4 °F (37.4 °C)   BP  Min: 101/65  Max: 134/74 134/74   Pulse  Min: 102  Max: 149  (!) 115   Resp  Min: 18  Max: 21 18   SpO2  Min: 95 %  Max: 97 % 97 %     I have reviewed the following labs:  Recent Labs   Lab 09/09/23  0502 09/10/23  0507 09/11/23  0519   WBC 14.51* 22.56* 19.31*   RBC 3.92* 4.07* 3.91*   HGB 12.6 13.3 12.8   HCT 36.1* 38.0 37.1   MCV 92.1 93.4 94.9*   MCH 32.1* 32.7* 32.7*   MCHC 34.9 35.0 34.5   RDW 13.2 12.9 13.1    236 317   MPV 10.8* 11.0* 10.2     Recent Labs   Lab 09/05/23  0207 09/06/23  0227 09/08/23  0509 09/09/23  0502 09/09/23  1438 09/10/23  0507    133* 132* 129*  --  130*   K 3.5 3.6 4.0 4.4  --  4.7   CO2 27 26 23 23  --  26   BUN 11.9 11.9 11.2 11.6  --  17.1   CREATININE 0.52* 0.50* 0.48* 0.47*  --  0.53*   CALCIUM 8.7 8.5 7.8* 8.0*  --  8.4   PH  --   --   --   --  7.430  --    MG 1.60 1.90  --   --   --   --    ALBUMIN 3.2* 3.0* 2.4*  --   --   --    ALKPHOS 77 75 100  --   --   --    ALT 32 34 50  --   --   --    AST 34 31 46*  --   --   --    BILITOT 1.0 0.9 0.7  --   --   --      Microbiology Results (last 7 days)       ** No results found for the last 168 hours. **             See below for Radiology    Scheduled Med:   amiodarone  200 mg Per NG tube BID    Followed by    [START ON 9/14/2023] amiodarone  200 mg Per NG tube Daily    doxycycline (VIBRAMYCIN) IVPB  100 mg Intravenous Q12H    levETIRAcetam (Keppra) IV (PEDS and ADULTS)  500 mg Intravenous  Q12H    LIDOcaine (PF) 10 mg/ml (1%)        metoprolol tartrate  50 mg Per NG tube BID    piperacillin-tazobactam (Zosyn) IV (PEDS and ADULTS) (extended infusion is not appropriate)  4.5 g Intravenous Q8H    propofol          Continuous Infusions:       PRN Meds:  albuterol-ipratropium, labetalol, LIDOcaine (PF) 10 mg/ml (1%), lorazepam, metoprolol, morphine, propofol     Assessment/Plan:  Left-sided pneumonia   Acute hypoxemic respiratory failure secondary to above  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   Acute encephalopathy secondary to above  Atrial flutter with RVR now sinus rhythm on amiodarone infusion  Right-sided paresis with aphasia due to above  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 draining into the superior sagittal sinus  Sinus tachycardia to dehydration  Borderline low blood pressure   Hypoxic respiratory failure on 2 L of oxygen  Elevated leukocytosis, trending upwards  Oropharyngeal dysphagia on NG tube feeds        Continue with OxyMask    is talking to both the hospice agencies today and will let us know about his decision later today  Comfort care only    Tube feedings on hold most likely aspiration   Morphine and Ativan as needed          Patient condition:  Stable/Fair/Guarded/ Serious/ Critical    Anticipated discharge and Disposition:         All diagnosis and differential diagnosis have been reviewed; assessment and plan has been documented; I have personally reviewed the labs and test results that are presently available; I have reviewed the patients medication list; I have reviewed the consulting providers response and recommendations. I have reviewed or attempted to review medical records based upon their availability    All of the patient's questions have been  addressed and answered. Patient's is agreeable to the above stated plan. I will continue to monitor closely and make adjustments to medical management as  needed.  _____________________________________________________________________    Nutrition Status:    Radiology:  I have personally reviewed the following imaging and agree with the radiologist.     X-Ray Chest 1 View  Narrative: EXAMINATION:  XR CHEST 1 VIEW    CLINICAL HISTORY:  coarse breath sounds;    TECHNIQUE:  Single frontal view of the chest was performed.    COMPARISON:  09/31/2023    FINDINGS:  There is interstitial infiltrate in the left upper lobe.  There is a left-sided pleural effusion.  The heart is slightly enlarged in size.  Aorta is slightly calcified.  Bones and joints show no acute abnormality.  There is a NG tube in good position.  Impression: Interval development of left upper lobe infiltrate and small left-sided pleural effusion    Electronically signed by: Glen Blalesteros  Date:    09/09/2023  Time:    12:32      Shima Omer MD   09/11/2023

## 2023-09-30 NOTE — DISCHARGE SUMMARY
"[facility] Hospital Medicine Discharge Summary    Admit Date: 8/30/2023  Discharge Date and Time:  September 11, 2023 Admitting Physician:  Team  Discharging Physician: Shima Omer MD.  Primary Care Physician: Shannan, Primary Doctor  Consults: Neurology    Discharge Diagnoses:  Please see September 11, 2023 note for hospital stay physical exam discharge diagnosis    Hospital Course:   Please see September 11, 2023 note for hospital stay physical exam discharge diagnosis  Pt was seen and examined on the day of discharge  Vitals:  VITAL SIGNS: 24 HRS MIN & MAX LAST   No data recorded 97.8 °F (36.6 °C)   No data recorded (!) 143/78   No data recorded  (!) 122   No data recorded 18   No data recorded (!) 91 %       Physical Exam:  Please see September 11, 2023 note for hospital stay physical exam discharge diagnosis    Procedures Performed: No admission procedures for hospital encounter.     Significant Diagnostic Studies: See Full reports for all details    No results for input(s): "WBC", "RBC", "HGB", "HCT", "MCV", "MCH", "MCHC", "RDW", "PLT", "MPV", "GRAN", "LYMPH", "MONO", "BASO", "NRBC" in the last 168 hours.    No results for input(s): "NA", "K", "CL", "CO2", "ANIONGAP", "BUN", "CREATININE", "GLU", "CALCIUM", "PH", "MG", "ALBUMIN", "PROT", "ALKPHOS", "ALT", "AST", "BILITOT" in the last 168 hours.     Microbiology Results (last 7 days)       ** No results found for the last 168 hours. **             X-Ray Chest 1 View  Narrative: EXAMINATION:  XR CHEST 1 VIEW    CLINICAL HISTORY:  coarse breath sounds;    TECHNIQUE:  Single frontal view of the chest was performed.    COMPARISON:  09/31/2023    FINDINGS:  There is interstitial infiltrate in the left upper lobe.  There is a left-sided pleural effusion.  The heart is slightly enlarged in size.  Aorta is slightly calcified.  Bones and joints show no acute abnormality.  There is a NG tube in good position.  Impression: Interval " development of left upper lobe infiltrate and small left-sided pleural effusion    Electronically signed by: Glen Ballesteros  Date:    09/09/2023  Time:    12:32         Medication List        START taking these medications      sodium chloride 0.9 % PgBk 100 mL with levETIRAcetam 500 mg/5 mL Soln 500 mg  Inject 500 mg into the vein every 12 (twelve) hours.               Where to Get Your Medications        Information about where to get these medications is not yet available    Ask your nurse or doctor about these medications  sodium chloride 0.9 % PgBk 100 mL with levETIRAcetam 500 mg/5 mL Soln 500 mg          Explained in detail to the patient about the discharge plan, medications, and follow-up visits. Pt understands and agrees with the treatment plan  Discharge Disposition: Hospice/Medical Facility   Discharged Condition: stable  Diet-    Medications Per DC med rec  Activities as tolerated   Follow-up Information       aYakov South MD. Go on 9/25/2023.    Specialty: Neurology  Why: Follow up appointment on 9-25 at 1:00 pm  Contact information:  [address] [place] [address] [address] [place] [address]  [phone]               John Winkler Jr., MD Follow up on 10/17/2023.    Specialty: Cardiology  Why: Hospital Follow Up @ 9:40AM  Contact information:  [address] [place] [address]  [phone]               No, Primary Doctor Follow up.    Why: As needed                         For further questions contact hospitalist office    Discharge time 33 minutes    For worsening symptoms, chest pain, shortness of breath, increased abdominal pain, high grade fever, stroke or stroke like symptoms, immediately go to the nearest Emergency Room or call 911 as soon as possible.      Shima Huitron M.D, on 9/30/2023. at 12:42 PM.

## 2024-02-25 NOTE — PROGRESS NOTES
[facility] Hospital Medicine Progress Note        Chief Complaint: Inpatient Follow-up for     HPI: 73 y.o. female without significant past medical history presented to [facility] on 8/30/2023 following a fall at home.   reported patient fell off the edge of the bed, but was caught by him.   denied LOC or head trauma.  EMS reported patient was confused on scene with slurred speech and right-sided facial droop.  Patient's last known normal was 2100 on 08/30/2023.  CT head revealed intraparenchymal hematoma in the left temporal lobe with intraventricular extension and subarachnoid hemorrhage of the left temporal lobe in the left sylvian fissure.  CTA head and neck revealed possible vascular malformation involving the inferior margin of the left posterior temporal intraparenchymal hemorrhage.  Neurology and Neurosurgery were consulted.  Patient was admitted to ICU for close monitoring. Patient was started on hypertonic saline at 50 cc/hour with sodium goal of 145, Keppra 500 mg BID, and Cleviprex for BP parameters below 140/90.  Patient was intubated and underwent diagnostic cerebral angiogram with Interventional Neurologist Dr. 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  CT head on 08/31 revealed no significant changes.  Embolization of the left middle meningeal artery arteriovenous fistula was unsuccessful secondary to tortuosity on 09/01 by Dr. South.  Cleviprex and hypertonic saline were discontinued on 09/02 with placement of feeding tube. Patient was extubated on 09/03 interventional neurology recommended AVM embolization in 3-4 weeks after the ICH has had time to resolve.  Patient was placed in restraints and started on Precedex for agitation.  Precedex was weaned and patient was started on Seroquel 50 mg for agitation on 9/04. Patient was cleared for downgrade out of ICU on  09/05. Hospital medicine was consulted for transition of care and further medical management.  Patient has a NG tube    Interval Hx:   Patient seen and examined this morning with significant other bedside patient was sounding very course she was sleepy.  Later called by the nursing staff that patient's oxygen saturation dropped    Objective/physical exam:  General: In no acute distress, afebrile  Chest:  Coarse breath sounds bilaterally    Heart: RRR, +S1, S2, no appreciable murmur  Abdomen: Soft, nontender, BS +  MSK: Warm,   Neurologic:  Sleepy opens her eyes  VITAL SIGNS: 24 HRS MIN & MAX LAST   Temp  Min: 98.9 °F (37.2 °C)  Max: 101.9 °F (38.8 °C) 99.4 °F (37.4 °C)   BP  Min: 110/67  Max: 138/68 138/68   Pulse  Min: 84  Max: 110  93   Resp  Min: 18  Max: 18 18   SpO2  Min: 83 %  Max: 99 % (!) 84 %     I have reviewed the following labs:  Recent Labs   Lab 09/07/23  0445 09/08/23  0509 09/09/23  0502   WBC 12.09* 12.74* 14.51*   RBC 4.09* 3.97* 3.92*   HGB 13.2 12.5 12.6   HCT 38.9 37.2 36.1*   MCV 95.1* 93.7 92.1   MCH 32.3* 31.5* 32.1*   MCHC 33.9 33.6 34.9   RDW 13.5 13.4 13.2    167 207   MPV 10.6* 10.9* 10.8*     Recent Labs   Lab 09/04/23  0222 09/05/23  0207 09/06/23  0227 09/08/23  0509 09/09/23  0502    138 133* 132* 129*   K 3.5 3.5 3.6 4.0 4.4   CO2 22* 27 26 23 23   BUN 10.1 11.9 11.9 11.2 11.6   CREATININE 0.44* 0.52* 0.50* 0.48* 0.47*   CALCIUM 8.4 8.7 8.5 7.8* 8.0*   MG 1.80 1.60 1.90  --   --    ALBUMIN 3.2* 3.2* 3.0* 2.4*  --    ALKPHOS 64 77 75 100  --    ALT 28 32 34 50  --    AST 40* 34 31 46*  --    BILITOT 1.2 1.0 0.9 0.7  --      Microbiology Results (last 7 days)       ** No results found for the last 168 hours. **             See below for Radiology    Scheduled Med:   amiodarone  400 mg Per NG tube BID    Followed by    [START ON 9/11/2023] amiodarone  200 mg Per NG tube BID    Followed by    [START ON 9/14/2023] amiodarone  200 mg Per NG tube Daily    doxycycline  (VIBRAMYCIN) IVPB  100 mg Intravenous Q12H    levetiracetam  500 mg Per NG tube BID    metoprolol tartrate  50 mg Per NG tube BID    piperacillin-tazobactam (Zosyn) IV (PEDS and ADULTS) (extended infusion is not appropriate)  4.5 g Intravenous Q8H    QUEtiapine  50 mg Oral BID      Continuous Infusions:       PRN Meds:  albuterol-ipratropium, labetalol, lorazepam, metoprolol     Assessment/Plan:  Left-sided pneumonia   Acute hypoxemic respiratory failure secondary to above  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  We have ordered stat ABGs  Chest x-ray done from this morning showed left upper lobe pneumonia, patient has been started on Zosyn and doxycycline we have ordered MRSA and respiratory PCR panel  Tube feedings on hold most likely aspiration   Give 20 of IV Lasix and DuoNeb  On oral amiodarone taper keep IV Lopressor for heart rate control if needed not on anticoagulation because of hemorrhagic CVA  In the meantime continue with PT OT and ST       Called and updated patient's  about the recent change in status they will come by little later today  Code status DNR    Critical care note:  Critical care diagnosis:  Acute hypoxemic respiratory failure on 11 L OxyMask secondary to pneumonia   Critical care interventions: Hands-on evaluation, review of labs/radiographs/records and  discussion with patient and family if present  Critical care time spent: 32 minutes   VTE prophylaxis:       Talked to the patient s  again about the code status he is agreeable with DNR at this point patient's  and family have decided and they want her to be comfort care only they just want her to be on non-rebreather and just to be kept comfortable on that so I have started the patient on morphine 1 mg IV q.4 and continue with Ativan as needed   We will respect family's wishes and keep her comfortable      Patient condition:  Stable/Fair/Guarded/ Serious/ Critical    Anticipated discharge and Disposition:         All diagnosis and differential diagnosis have been reviewed; assessment and plan has been documented; I have personally reviewed the labs and test results that are presently available; I have reviewed the patients medication list; I have reviewed the consulting providers response and recommendations. I have reviewed or attempted to review medical records based upon their availability    All of the patient's questions have been  addressed and answered. Patient's is agreeable to the above stated plan. I will continue to monitor closely and make adjustments to medical management as needed.  _____________________________________________________________________    Nutrition Status:    Radiology:  I have personally reviewed the following imaging and agree with the radiologist.     X-Ray Chest 1 View  Narrative: EXAMINATION:  XR CHEST 1 VIEW    CLINICAL HISTORY:  coarse breath sounds;    TECHNIQUE:  Single frontal view of the chest was performed.    COMPARISON:  09/31/2023    FINDINGS:  There is interstitial infiltrate in the left upper lobe.  There is a left-sided pleural effusion.  The heart is slightly enlarged in size.  Aorta is slightly calcified.  Bones and joints show no acute abnormality.  There is a NG tube in good position.  Impression: Interval development of left upper lobe infiltrate  and small left-sided pleural effusion    Electronically signed by: Glen Ballesteros  Date:    09/09/2023  Time:    12:32      Shima Omer MD   09/09/2023                 No

## 2025-07-17 NOTE — ASSESSMENT & PLAN NOTE
Intracerebral hemorrhage and traumatic SAH    Planning for cerebral angio today to repair a fistula    -Recommend SBP less than 140  -Q1 hour neuro checks  -Avoid antiplatelets/anticoagulation  -Seizure precautions   -neurosurgery following, no surgical intervention         none